# Patient Record
Sex: FEMALE | Race: WHITE | NOT HISPANIC OR LATINO | Employment: UNEMPLOYED | URBAN - METROPOLITAN AREA
[De-identification: names, ages, dates, MRNs, and addresses within clinical notes are randomized per-mention and may not be internally consistent; named-entity substitution may affect disease eponyms.]

---

## 2018-03-11 ENCOUNTER — NURSE TRIAGE (OUTPATIENT)
Dept: ADMINISTRATIVE | Facility: CLINIC | Age: 63
End: 2018-03-11

## 2018-03-11 PROBLEM — G44.209 TENSION TYPE HEADACHE: Status: ACTIVE | Noted: 2018-03-11

## 2018-03-11 NOTE — TELEPHONE ENCOUNTER
Reason for Disposition   Difficult to awaken or acting confused  (e.g., disoriented, slurred speech)    Protocols used: ST HEADACHE-A-AH    EC states that pt c/o HA for the last few days.  Today pt is confused, described as having a hard time completing a sentence or focusing. Last BP was 160/90.  EC advised per protocol and verbalizes understanding.

## 2018-03-15 PROBLEM — I63.412 STROKE DUE TO EMBOLISM OF LEFT MIDDLE CEREBRAL ARTERY: Status: ACTIVE | Noted: 2018-03-15

## 2018-03-15 PROBLEM — I63.9 STROKE: Status: ACTIVE | Noted: 2018-03-15

## 2018-08-02 PROBLEM — R73.01 IFG (IMPAIRED FASTING GLUCOSE): Status: ACTIVE | Noted: 2018-08-02

## 2019-07-22 ENCOUNTER — PATIENT OUTREACH (OUTPATIENT)
Dept: ADMINISTRATIVE | Facility: HOSPITAL | Age: 64
End: 2019-07-22

## 2019-08-11 ENCOUNTER — LAB VISIT (OUTPATIENT)
Dept: LAB | Facility: HOSPITAL | Age: 64
End: 2019-08-11
Attending: NURSE PRACTITIONER

## 2019-08-11 DIAGNOSIS — Z12.11 COLON CANCER SCREENING: ICD-10-CM

## 2019-08-11 PROCEDURE — 82274 ASSAY TEST FOR BLOOD FECAL: CPT

## 2019-08-16 LAB — HEMOCCULT STL QL IA: POSITIVE

## 2020-01-27 ENCOUNTER — PATIENT OUTREACH (OUTPATIENT)
Dept: ADMINISTRATIVE | Facility: HOSPITAL | Age: 65
End: 2020-01-27

## 2021-04-28 ENCOUNTER — PATIENT OUTREACH (OUTPATIENT)
Dept: ADMINISTRATIVE | Facility: HOSPITAL | Age: 66
End: 2021-04-28

## 2021-04-28 DIAGNOSIS — Z12.31 ENCOUNTER FOR SCREENING MAMMOGRAM FOR MALIGNANT NEOPLASM OF BREAST: Primary | ICD-10-CM

## 2021-06-16 ENCOUNTER — OFFICE VISIT (OUTPATIENT)
Dept: FAMILY MEDICINE | Facility: CLINIC | Age: 66
End: 2021-06-16
Payer: MEDICARE

## 2021-06-16 VITALS
WEIGHT: 214.75 LBS | HEART RATE: 63 BPM | DIASTOLIC BLOOD PRESSURE: 65 MMHG | HEIGHT: 66 IN | SYSTOLIC BLOOD PRESSURE: 132 MMHG | BODY MASS INDEX: 34.51 KG/M2 | TEMPERATURE: 98 F

## 2021-06-16 DIAGNOSIS — R73.01 IFG (IMPAIRED FASTING GLUCOSE): ICD-10-CM

## 2021-06-16 DIAGNOSIS — I10 ESSENTIAL HYPERTENSION: ICD-10-CM

## 2021-06-16 DIAGNOSIS — Z76.0 MEDICATION REFILL: ICD-10-CM

## 2021-06-16 DIAGNOSIS — E78.5 HYPERLIPIDEMIA, UNSPECIFIED HYPERLIPIDEMIA TYPE: ICD-10-CM

## 2021-06-16 DIAGNOSIS — Z00.00 ANNUAL PHYSICAL EXAM: Primary | ICD-10-CM

## 2021-06-16 DIAGNOSIS — I63.412 STROKE DUE TO EMBOLISM OF LEFT MIDDLE CEREBRAL ARTERY: ICD-10-CM

## 2021-06-16 PROCEDURE — 3008F PR BODY MASS INDEX (BMI) DOCUMENTED: ICD-10-PCS | Mod: S$GLB,,, | Performed by: NURSE PRACTITIONER

## 2021-06-16 PROCEDURE — 3288F FALL RISK ASSESSMENT DOCD: CPT | Mod: S$GLB,,, | Performed by: NURSE PRACTITIONER

## 2021-06-16 PROCEDURE — 1101F PR PT FALLS ASSESS DOC 0-1 FALLS W/OUT INJ PAST YR: ICD-10-PCS | Mod: S$GLB,,, | Performed by: NURSE PRACTITIONER

## 2021-06-16 PROCEDURE — 99204 OFFICE O/P NEW MOD 45 MIN: CPT | Mod: S$GLB,,, | Performed by: NURSE PRACTITIONER

## 2021-06-16 PROCEDURE — 99999 PR PBB SHADOW E&M-EST. PATIENT-LVL IV: CPT | Mod: PBBFAC,,, | Performed by: NURSE PRACTITIONER

## 2021-06-16 PROCEDURE — 1126F AMNT PAIN NOTED NONE PRSNT: CPT | Mod: S$GLB,,, | Performed by: NURSE PRACTITIONER

## 2021-06-16 PROCEDURE — 1101F PT FALLS ASSESS-DOCD LE1/YR: CPT | Mod: S$GLB,,, | Performed by: NURSE PRACTITIONER

## 2021-06-16 PROCEDURE — 3288F PR FALLS RISK ASSESSMENT DOCUMENTED: ICD-10-PCS | Mod: S$GLB,,, | Performed by: NURSE PRACTITIONER

## 2021-06-16 PROCEDURE — 3008F BODY MASS INDEX DOCD: CPT | Mod: S$GLB,,, | Performed by: NURSE PRACTITIONER

## 2021-06-16 PROCEDURE — 99204 PR OFFICE/OUTPT VISIT, NEW, LEVL IV, 45-59 MIN: ICD-10-PCS | Mod: S$GLB,,, | Performed by: NURSE PRACTITIONER

## 2021-06-16 PROCEDURE — 1126F PR PAIN SEVERITY QUANTIFIED, NO PAIN PRESENT: ICD-10-PCS | Mod: S$GLB,,, | Performed by: NURSE PRACTITIONER

## 2021-06-16 PROCEDURE — 99999 PR PBB SHADOW E&M-EST. PATIENT-LVL IV: ICD-10-PCS | Mod: PBBFAC,,, | Performed by: NURSE PRACTITIONER

## 2021-06-16 RX ORDER — ATORVASTATIN CALCIUM 80 MG/1
80 TABLET, FILM COATED ORAL DAILY
Qty: 90 TABLET | Refills: 1 | Status: SHIPPED | OUTPATIENT
Start: 2021-06-16 | End: 2022-02-14 | Stop reason: SDUPTHER

## 2021-06-16 RX ORDER — CARVEDILOL 6.25 MG/1
6.25 TABLET ORAL 2 TIMES DAILY WITH MEALS
Qty: 180 TABLET | Refills: 1 | Status: SHIPPED | OUTPATIENT
Start: 2021-06-16 | End: 2022-02-14 | Stop reason: SDUPTHER

## 2021-06-16 RX ORDER — CLOPIDOGREL BISULFATE 75 MG/1
75 TABLET ORAL DAILY
Qty: 90 TABLET | Refills: 1 | Status: SHIPPED | OUTPATIENT
Start: 2021-06-16 | End: 2022-02-14 | Stop reason: SDUPTHER

## 2021-06-16 RX ORDER — AMLODIPINE BESYLATE 10 MG/1
10 TABLET ORAL DAILY
Qty: 90 TABLET | Refills: 1 | Status: SHIPPED | OUTPATIENT
Start: 2021-06-16 | End: 2022-02-14 | Stop reason: SDUPTHER

## 2021-08-06 ENCOUNTER — LAB VISIT (OUTPATIENT)
Dept: LAB | Facility: HOSPITAL | Age: 66
End: 2021-08-06
Attending: NURSE PRACTITIONER
Payer: MEDICARE

## 2021-08-06 DIAGNOSIS — I10 ESSENTIAL HYPERTENSION: ICD-10-CM

## 2021-08-06 LAB — TSH SERPL DL<=0.005 MIU/L-ACNC: 2.19 UIU/ML (ref 0.4–4)

## 2021-08-06 PROCEDURE — 84443 ASSAY THYROID STIM HORMONE: CPT | Performed by: NURSE PRACTITIONER

## 2021-08-06 PROCEDURE — 36415 COLL VENOUS BLD VENIPUNCTURE: CPT | Mod: PO | Performed by: NURSE PRACTITIONER

## 2021-08-16 ENCOUNTER — OFFICE VISIT (OUTPATIENT)
Dept: FAMILY MEDICINE | Facility: CLINIC | Age: 66
End: 2021-08-16
Payer: MEDICARE

## 2021-08-16 VITALS
BODY MASS INDEX: 33.75 KG/M2 | HEART RATE: 60 BPM | TEMPERATURE: 98 F | WEIGHT: 210 LBS | HEIGHT: 66 IN | DIASTOLIC BLOOD PRESSURE: 68 MMHG | SYSTOLIC BLOOD PRESSURE: 131 MMHG

## 2021-08-16 DIAGNOSIS — E87.6 HYPOKALEMIA: ICD-10-CM

## 2021-08-16 DIAGNOSIS — Z86.73 HISTORY OF CARDIOEMBOLIC CEREBROVASCULAR ACCIDENT (CVA): ICD-10-CM

## 2021-08-16 DIAGNOSIS — E78.2 MIXED HYPERLIPIDEMIA: Primary | ICD-10-CM

## 2021-08-16 DIAGNOSIS — Z12.31 ENCOUNTER FOR SCREENING MAMMOGRAM FOR BREAST CANCER: ICD-10-CM

## 2021-08-16 DIAGNOSIS — R42 VERTIGO: ICD-10-CM

## 2021-08-16 DIAGNOSIS — I10 ESSENTIAL HYPERTENSION: ICD-10-CM

## 2021-08-16 PROCEDURE — 3288F PR FALLS RISK ASSESSMENT DOCUMENTED: ICD-10-PCS | Mod: S$GLB,,, | Performed by: INTERNAL MEDICINE

## 2021-08-16 PROCEDURE — 1100F PTFALLS ASSESS-DOCD GE2>/YR: CPT | Mod: S$GLB,,, | Performed by: INTERNAL MEDICINE

## 2021-08-16 PROCEDURE — 1126F PR PAIN SEVERITY QUANTIFIED, NO PAIN PRESENT: ICD-10-PCS | Mod: S$GLB,,, | Performed by: INTERNAL MEDICINE

## 2021-08-16 PROCEDURE — 1100F PR PT FALLS ASSESS DOC 2+ FALLS/FALL W/INJURY/YR: ICD-10-PCS | Mod: S$GLB,,, | Performed by: INTERNAL MEDICINE

## 2021-08-16 PROCEDURE — 3075F SYST BP GE 130 - 139MM HG: CPT | Mod: S$GLB,,, | Performed by: INTERNAL MEDICINE

## 2021-08-16 PROCEDURE — 1126F AMNT PAIN NOTED NONE PRSNT: CPT | Mod: S$GLB,,, | Performed by: INTERNAL MEDICINE

## 2021-08-16 PROCEDURE — 3078F PR MOST RECENT DIASTOLIC BLOOD PRESSURE < 80 MM HG: ICD-10-PCS | Mod: S$GLB,,, | Performed by: INTERNAL MEDICINE

## 2021-08-16 PROCEDURE — 3078F DIAST BP <80 MM HG: CPT | Mod: S$GLB,,, | Performed by: INTERNAL MEDICINE

## 2021-08-16 PROCEDURE — 3044F HG A1C LEVEL LT 7.0%: CPT | Mod: S$GLB,,, | Performed by: INTERNAL MEDICINE

## 2021-08-16 PROCEDURE — 99214 OFFICE O/P EST MOD 30 MIN: CPT | Mod: S$GLB,,, | Performed by: INTERNAL MEDICINE

## 2021-08-16 PROCEDURE — 1159F MED LIST DOCD IN RCRD: CPT | Mod: S$GLB,,, | Performed by: INTERNAL MEDICINE

## 2021-08-16 PROCEDURE — 3288F FALL RISK ASSESSMENT DOCD: CPT | Mod: S$GLB,,, | Performed by: INTERNAL MEDICINE

## 2021-08-16 PROCEDURE — 3075F PR MOST RECENT SYSTOLIC BLOOD PRESS GE 130-139MM HG: ICD-10-PCS | Mod: S$GLB,,, | Performed by: INTERNAL MEDICINE

## 2021-08-16 PROCEDURE — 3008F PR BODY MASS INDEX (BMI) DOCUMENTED: ICD-10-PCS | Mod: S$GLB,,, | Performed by: INTERNAL MEDICINE

## 2021-08-16 PROCEDURE — 3008F BODY MASS INDEX DOCD: CPT | Mod: S$GLB,,, | Performed by: INTERNAL MEDICINE

## 2021-08-16 PROCEDURE — 99214 PR OFFICE/OUTPT VISIT, EST, LEVL IV, 30-39 MIN: ICD-10-PCS | Mod: S$GLB,,, | Performed by: INTERNAL MEDICINE

## 2021-08-16 PROCEDURE — 3044F PR MOST RECENT HEMOGLOBIN A1C LEVEL <7.0%: ICD-10-PCS | Mod: S$GLB,,, | Performed by: INTERNAL MEDICINE

## 2021-08-16 PROCEDURE — 99999 PR PBB SHADOW E&M-EST. PATIENT-LVL IV: ICD-10-PCS | Mod: PBBFAC,,, | Performed by: INTERNAL MEDICINE

## 2021-08-16 PROCEDURE — 1159F PR MEDICATION LIST DOCUMENTED IN MEDICAL RECORD: ICD-10-PCS | Mod: S$GLB,,, | Performed by: INTERNAL MEDICINE

## 2021-08-16 PROCEDURE — 99999 PR PBB SHADOW E&M-EST. PATIENT-LVL IV: CPT | Mod: PBBFAC,,, | Performed by: INTERNAL MEDICINE

## 2021-08-16 RX ORDER — METHYLPREDNISOLONE 4 MG/1
TABLET ORAL
Qty: 21 TABLET | Refills: 0 | Status: SHIPPED | OUTPATIENT
Start: 2021-08-16 | End: 2022-05-12

## 2021-08-16 RX ORDER — FLUTICASONE PROPIONATE 50 MCG
SPRAY, SUSPENSION (ML) NASAL
Qty: 16 G | Refills: 12 | Status: SHIPPED | OUTPATIENT
Start: 2021-08-16 | End: 2022-02-14 | Stop reason: SDUPTHER

## 2022-02-14 DIAGNOSIS — E87.6 HYPOKALEMIA: ICD-10-CM

## 2022-02-14 DIAGNOSIS — E78.2 MIXED HYPERLIPIDEMIA: Primary | ICD-10-CM

## 2022-02-14 DIAGNOSIS — I10 ESSENTIAL HYPERTENSION: ICD-10-CM

## 2022-02-14 DIAGNOSIS — R42 VERTIGO: ICD-10-CM

## 2022-02-14 RX ORDER — CARVEDILOL 6.25 MG/1
6.25 TABLET ORAL 2 TIMES DAILY WITH MEALS
Qty: 180 TABLET | Refills: 0 | Status: SHIPPED | OUTPATIENT
Start: 2022-02-14 | End: 2022-05-12 | Stop reason: SDUPTHER

## 2022-02-14 RX ORDER — CLOPIDOGREL BISULFATE 75 MG/1
75 TABLET ORAL DAILY
Qty: 90 TABLET | Refills: 0 | Status: SHIPPED | OUTPATIENT
Start: 2022-02-14 | End: 2022-05-12 | Stop reason: SDUPTHER

## 2022-02-14 RX ORDER — AMLODIPINE BESYLATE 10 MG/1
10 TABLET ORAL DAILY
Qty: 90 TABLET | Refills: 0 | Status: SHIPPED | OUTPATIENT
Start: 2022-02-14 | End: 2022-05-12 | Stop reason: SDUPTHER

## 2022-02-14 RX ORDER — HYDROCHLOROTHIAZIDE 25 MG/1
25 TABLET ORAL DAILY
Qty: 90 TABLET | Refills: 0 | Status: SHIPPED | OUTPATIENT
Start: 2022-02-14 | End: 2022-05-03 | Stop reason: SDUPTHER

## 2022-02-14 RX ORDER — POTASSIUM CHLORIDE 20 MEQ/1
20 TABLET, EXTENDED RELEASE ORAL DAILY
Qty: 90 TABLET | Refills: 0 | Status: SHIPPED | OUTPATIENT
Start: 2022-02-14 | End: 2022-05-12 | Stop reason: SDUPTHER

## 2022-02-14 RX ORDER — ATORVASTATIN CALCIUM 80 MG/1
80 TABLET, FILM COATED ORAL DAILY
Qty: 90 TABLET | Refills: 0 | Status: SHIPPED | OUTPATIENT
Start: 2022-02-14 | End: 2022-05-12 | Stop reason: SDUPTHER

## 2022-02-14 RX ORDER — FLUTICASONE PROPIONATE 50 MCG
SPRAY, SUSPENSION (ML) NASAL
Qty: 16 G | Refills: 0 | Status: SHIPPED | OUTPATIENT
Start: 2022-02-14 | End: 2022-05-12 | Stop reason: SDUPTHER

## 2022-02-14 NOTE — TELEPHONE ENCOUNTER
----- Message from Lauren Issa sent at 2/14/2022  8:46 AM CST -----  HAD TO CANCEL HER APPT WITH [facility].  She will be out of her meds can you please refill all of her meds.  She will be out.  [facility] [place].  Please advise

## 2022-02-14 NOTE — TELEPHONE ENCOUNTER
No new care gaps identified.  Powered by [employer]. Reference number: [id number].   2/14/2022 10:52:25 AM CST

## 2022-02-15 NOTE — TELEPHONE ENCOUNTER
----- Message from Tammi Dotson sent at 2/15/2022 10:35 AM CST -----  Contact: self/[phone]  Pt called in regards to checking the status of her paper work so she can stop her Rx so she can get her teeth abstraction. Pt would like a call back.      Please advise

## 2022-02-15 NOTE — TELEPHONE ENCOUNTER
The patient requested medicine refills and I did refill it once.  Message the patient to notify of any health maintenance care gaps that need to be arranged.   Health Maintenance Due   Topic Date Due    COVID-19 Vaccine (1) Never done    Shingles Vaccine (1 of 2) Never done    Pneumococcal Vaccines (Age 65+) (1 of 1 - PPSV23) Never done    Mammogram  02/10/2021    Influenza Vaccine (1) Never done     BP Readings from Last 3 Encounters:   08/16/21 131/68   06/16/21 132/65   02/10/21 132/60     [unfilled]

## 2022-02-15 NOTE — TELEPHONE ENCOUNTER
Pt dropped off paper from [facility] needing clearance to have a tooth extraction. Pt is currently on a  blood thinner, so paper needs to be signed by physician. pls advise. Thanks.

## 2022-02-15 NOTE — TELEPHONE ENCOUNTER
She is apparently on aspirin and Plavix from remote stroke from 2018. She can hold aspirin starting 7 days prior to procedure and hold Plavix starting 5 days prior to procedure.  She can resume after hemostasis obtained after procedure.

## 2022-02-16 DIAGNOSIS — I10 ESSENTIAL HYPERTENSION: ICD-10-CM

## 2022-05-03 DIAGNOSIS — I10 ESSENTIAL HYPERTENSION: ICD-10-CM

## 2022-05-03 RX ORDER — HYDROCHLOROTHIAZIDE 25 MG/1
25 TABLET ORAL DAILY
Qty: 90 TABLET | Refills: 0 | Status: SHIPPED | OUTPATIENT
Start: 2022-05-03 | End: 2022-05-09 | Stop reason: SDUPTHER

## 2022-05-03 NOTE — TELEPHONE ENCOUNTER
Care Due:                  Date            Visit Type   Department     Provider  --------------------------------------------------------------------------------                                EP -                              PRIMARY      [facility]  Last Visit: 08-      CARE ([facility])   DHEERAJ Rosario                               -                              PRIMARY      [facility]  Next Visit: 05-      CARE ([facility])   MEDICINE       Ximena Rosario                                                            Last  Test          Frequency    Reason                     Performed    Due Date  --------------------------------------------------------------------------------    CBC.........  12 months..  clopidogreL..............  02-   02-    CMP.........  12 months..  atorvastatin,              02-   02-                             hydroCHLOROthiazide,                             potassium................    Lipid Panel.  12 months..  atorvastatin.............  02-   02-    Health [facility] Embedded Care Gaps. Reference number: [id number]. 5/03/2022   12:29:38 PM CDT

## 2022-05-03 NOTE — TELEPHONE ENCOUNTER
----- Message from Tayler James sent at 5/3/2022 11:12 AM CDT -----  Type:  RX Refill Request    Who Called: pt  Refill or New Rx:refill  RX Name and Strength:hydrochlorothiazide   How is the patient currently taking it? (ex. 1XDay):1XDay  Is this a 30 day or 90 day RX:90  Preferred Pharmacy with phone number:.  [facility] [address] [place], [place] - [address] [facility]  [address] [address]  Phone: [phone] Fax: [phone]  Local or Mail Order:local  Ordering Provider:Dr Diop   Would the patient rather a call back or a response via [facility]? call  Best Call Back Number:[phone]   Additional Information: States she is going to run out of the medication, before her appt on next Thursday. She would like a call or text when its called in.        Thank you

## 2022-05-09 DIAGNOSIS — I10 ESSENTIAL HYPERTENSION: ICD-10-CM

## 2022-05-09 RX ORDER — HYDROCHLOROTHIAZIDE 25 MG/1
25 TABLET ORAL DAILY
Qty: 90 TABLET | Refills: 0 | Status: SHIPPED | OUTPATIENT
Start: 2022-05-09 | End: 2022-05-12 | Stop reason: SDUPTHER

## 2022-05-09 NOTE — TELEPHONE ENCOUNTER
No new care gaps identified.  [facility] Embedded Care Gaps. Reference number: [id number]. 5/09/2022   2:33:00 PM CDT

## 2022-05-12 ENCOUNTER — OFFICE VISIT (OUTPATIENT)
Dept: FAMILY MEDICINE | Facility: CLINIC | Age: 67
End: 2022-05-12
Payer: MEDICARE

## 2022-05-12 ENCOUNTER — HOSPITAL ENCOUNTER (OUTPATIENT)
Dept: RADIOLOGY | Facility: HOSPITAL | Age: 67
Discharge: HOME | End: 2022-05-12
Attending: INTERNAL MEDICINE
Payer: MEDICARE

## 2022-05-12 ENCOUNTER — TELEPHONE (OUTPATIENT)
Dept: FAMILY MEDICINE | Facility: CLINIC | Age: 67
End: 2022-05-12
Payer: MEDICARE

## 2022-05-12 VITALS
TEMPERATURE: 98 F | WEIGHT: 189 LBS | HEIGHT: 66 IN | SYSTOLIC BLOOD PRESSURE: 139 MMHG | BODY MASS INDEX: 30.37 KG/M2 | HEART RATE: 71 BPM | DIASTOLIC BLOOD PRESSURE: 66 MMHG

## 2022-05-12 DIAGNOSIS — R63.4 WEIGHT LOSS: ICD-10-CM

## 2022-05-12 DIAGNOSIS — R91.8 MULTIPLE PULMONARY NODULES: Primary | ICD-10-CM

## 2022-05-12 DIAGNOSIS — E87.6 HYPOKALEMIA: ICD-10-CM

## 2022-05-12 DIAGNOSIS — E78.2 MIXED HYPERLIPIDEMIA: ICD-10-CM

## 2022-05-12 DIAGNOSIS — Z86.73 HISTORY OF CARDIOEMBOLIC CEREBROVASCULAR ACCIDENT (CVA): ICD-10-CM

## 2022-05-12 DIAGNOSIS — I10 ESSENTIAL HYPERTENSION: Primary | ICD-10-CM

## 2022-05-12 DIAGNOSIS — R73.9 HYPERGLYCEMIA: ICD-10-CM

## 2022-05-12 DIAGNOSIS — Z79.899 ENCOUNTER FOR LONG-TERM (CURRENT) USE OF HIGH-RISK MEDICATION: ICD-10-CM

## 2022-05-12 DIAGNOSIS — R06.2 WHEEZING: ICD-10-CM

## 2022-05-12 DIAGNOSIS — R42 VERTIGO: ICD-10-CM

## 2022-05-12 DIAGNOSIS — Z13.6 ENCOUNTER FOR LIPID SCREENING FOR CARDIOVASCULAR DISEASE: ICD-10-CM

## 2022-05-12 DIAGNOSIS — Z13.220 ENCOUNTER FOR LIPID SCREENING FOR CARDIOVASCULAR DISEASE: ICD-10-CM

## 2022-05-12 DIAGNOSIS — J30.9 ALLERGIC RHINITIS, UNSPECIFIED SEASONALITY, UNSPECIFIED TRIGGER: ICD-10-CM

## 2022-05-12 PROCEDURE — 71046 X-RAY EXAM CHEST 2 VIEWS: CPT | Mod: TC,PO

## 2022-05-12 PROCEDURE — 99999 PR PBB SHADOW E&M-EST. PATIENT-LVL IV: CPT | Mod: PBBFAC,,, | Performed by: INTERNAL MEDICINE

## 2022-05-12 PROCEDURE — 3044F HG A1C LEVEL LT 7.0%: CPT | Mod: CPTII,S$GLB,, | Performed by: INTERNAL MEDICINE

## 2022-05-12 PROCEDURE — 3078F PR MOST RECENT DIASTOLIC BLOOD PRESSURE < 80 MM HG: ICD-10-PCS | Mod: CPTII,S$GLB,, | Performed by: INTERNAL MEDICINE

## 2022-05-12 PROCEDURE — 3044F PR MOST RECENT HEMOGLOBIN A1C LEVEL <7.0%: ICD-10-PCS | Mod: CPTII,S$GLB,, | Performed by: INTERNAL MEDICINE

## 2022-05-12 PROCEDURE — 3078F DIAST BP <80 MM HG: CPT | Mod: CPTII,S$GLB,, | Performed by: INTERNAL MEDICINE

## 2022-05-12 PROCEDURE — 1126F AMNT PAIN NOTED NONE PRSNT: CPT | Mod: CPTII,S$GLB,, | Performed by: INTERNAL MEDICINE

## 2022-05-12 PROCEDURE — 1101F PT FALLS ASSESS-DOCD LE1/YR: CPT | Mod: CPTII,S$GLB,, | Performed by: INTERNAL MEDICINE

## 2022-05-12 PROCEDURE — 3288F PR FALLS RISK ASSESSMENT DOCUMENTED: ICD-10-PCS | Mod: CPTII,S$GLB,, | Performed by: INTERNAL MEDICINE

## 2022-05-12 PROCEDURE — 3008F BODY MASS INDEX DOCD: CPT | Mod: CPTII,S$GLB,, | Performed by: INTERNAL MEDICINE

## 2022-05-12 PROCEDURE — 1126F PR PAIN SEVERITY QUANTIFIED, NO PAIN PRESENT: ICD-10-PCS | Mod: CPTII,S$GLB,, | Performed by: INTERNAL MEDICINE

## 2022-05-12 PROCEDURE — 71046 XR CHEST PA AND LATERAL: ICD-10-PCS | Mod: 26,,, | Performed by: RADIOLOGY

## 2022-05-12 PROCEDURE — 3075F PR MOST RECENT SYSTOLIC BLOOD PRESS GE 130-139MM HG: ICD-10-PCS | Mod: CPTII,S$GLB,, | Performed by: INTERNAL MEDICINE

## 2022-05-12 PROCEDURE — 99214 PR OFFICE/OUTPT VISIT, EST, LEVL IV, 30-39 MIN: ICD-10-PCS | Mod: S$GLB,,, | Performed by: INTERNAL MEDICINE

## 2022-05-12 PROCEDURE — 99999 PR PBB SHADOW E&M-EST. PATIENT-LVL IV: ICD-10-PCS | Mod: PBBFAC,,, | Performed by: INTERNAL MEDICINE

## 2022-05-12 PROCEDURE — 3075F SYST BP GE 130 - 139MM HG: CPT | Mod: CPTII,S$GLB,, | Performed by: INTERNAL MEDICINE

## 2022-05-12 PROCEDURE — 1101F PR PT FALLS ASSESS DOC 0-1 FALLS W/OUT INJ PAST YR: ICD-10-PCS | Mod: CPTII,S$GLB,, | Performed by: INTERNAL MEDICINE

## 2022-05-12 PROCEDURE — 3288F FALL RISK ASSESSMENT DOCD: CPT | Mod: CPTII,S$GLB,, | Performed by: INTERNAL MEDICINE

## 2022-05-12 PROCEDURE — 71046 X-RAY EXAM CHEST 2 VIEWS: CPT | Mod: 26,,, | Performed by: RADIOLOGY

## 2022-05-12 PROCEDURE — 3008F PR BODY MASS INDEX (BMI) DOCUMENTED: ICD-10-PCS | Mod: CPTII,S$GLB,, | Performed by: INTERNAL MEDICINE

## 2022-05-12 PROCEDURE — 99214 OFFICE O/P EST MOD 30 MIN: CPT | Mod: S$GLB,,, | Performed by: INTERNAL MEDICINE

## 2022-05-12 RX ORDER — POTASSIUM CHLORIDE 20 MEQ/1
20 TABLET, EXTENDED RELEASE ORAL DAILY
Qty: 90 TABLET | Refills: 1 | Status: ON HOLD | OUTPATIENT
Start: 2022-05-12 | End: 2022-09-06 | Stop reason: SDUPTHER

## 2022-05-12 RX ORDER — CLOPIDOGREL BISULFATE 75 MG/1
75 TABLET ORAL DAILY
Qty: 90 TABLET | Refills: 1 | Status: SHIPPED | OUTPATIENT
Start: 2022-05-12 | End: 2022-08-09

## 2022-05-12 RX ORDER — FLUTICASONE PROPIONATE 50 MCG
SPRAY, SUSPENSION (ML) NASAL
Qty: 16 G | Refills: 0 | Status: SHIPPED | OUTPATIENT
Start: 2022-05-12

## 2022-05-12 RX ORDER — AMLODIPINE BESYLATE 10 MG/1
10 TABLET ORAL DAILY
Qty: 90 TABLET | Refills: 1 | Status: ON HOLD | OUTPATIENT
Start: 2022-05-12 | End: 2022-09-06 | Stop reason: SDUPTHER

## 2022-05-12 RX ORDER — CARVEDILOL 6.25 MG/1
6.25 TABLET ORAL 2 TIMES DAILY WITH MEALS
Qty: 180 TABLET | Refills: 1 | Status: SHIPPED | OUTPATIENT
Start: 2022-05-12 | End: 2022-07-11

## 2022-05-12 RX ORDER — ATORVASTATIN CALCIUM 80 MG/1
80 TABLET, FILM COATED ORAL DAILY
Qty: 90 TABLET | Refills: 1 | Status: ON HOLD | OUTPATIENT
Start: 2022-05-12 | End: 2022-09-06 | Stop reason: SDUPTHER

## 2022-05-12 RX ORDER — CETIRIZINE HYDROCHLORIDE 10 MG/1
10 TABLET ORAL DAILY
Qty: 30 TABLET | Refills: 11 | Status: ON HOLD | OUTPATIENT
Start: 2022-05-12 | End: 2022-09-06 | Stop reason: SDUPTHER

## 2022-05-12 RX ORDER — HYDROCHLOROTHIAZIDE 25 MG/1
25 TABLET ORAL DAILY
Qty: 90 TABLET | Refills: 1 | Status: SHIPPED | OUTPATIENT
Start: 2022-05-12 | End: 2022-09-01 | Stop reason: CLARIF

## 2022-05-12 NOTE — TELEPHONE ENCOUNTER
Concerning findings on chest xray. Patient is aware of chest xray results.    Needs follow up with CT    I have signed for the following orders AND/OR meds.  Please call the patient and ask the patient to schedule the testing AND/OR inform about any medications that were sent. Medications have been sent to pharmacy listed below      Orders Placed This Encounter   Procedures    CT Chest Abdomen Pelvis W W/O Contrast (XPD)     Standing Status:   Future     Standing Expiration Date:   5/12/2023     Order Specific Question:   Is the patient allergic to iodine or contrast?     Answer:   No     Order Specific Question:   Is the patient on ANY Metformin drug such as Glucophage/Glucovance?           Should be off drug 48 hours after contrast. Check renal function before restart.     Answer:   No     Order Specific Question:   History of Kidney Disease - including: decreased kidney function, dialysis, kidney transplay, single kidney, kidney cancer, kidney surgery?     Answer:   None     Order Specific Question:   Diabetes?     Answer:   No     Order Specific Question:   May the Radiologist modify the order per protocol to meet the clinical needs of the patient?     Answer:   Yes     Order Specific Question:   Oral/Rectal Contrast instructions:     Answer:   NO Oral Contrast     Order Specific Question:   Special CT ABD Protocol Request?     Answer:   Routine    CT Chest Abdomen Pelvis W W/O Contrast (XPD)     Standing Status:   Future     Standing Expiration Date:   5/12/2023     Order Specific Question:   Is the patient allergic to iodine or contrast?     Answer:   No     Order Specific Question:   Is the patient on ANY Metformin drug such as Glucophage/Glucovance?           Should be off drug 48 hours after contrast. Check renal function before restart.     Answer:   No     Order Specific Question:   History of Kidney Disease - including: decreased kidney function, dialysis, kidney transplay, single kidney, kidney  cancer, kidney surgery?     Answer:   None     Order Specific Question:   Diabetes?     Answer:   No     Order Specific Question:   May the Radiologist modify the order per protocol to meet the clinical needs of the patient?     Answer:   Yes     Order Specific Question:   Oral/Rectal Contrast instructions:     Answer:   NO Oral Contrast     Order Specific Question:   Special CT ABD Protocol Request?     Answer:   Routine              [id number]  [place] [address] [facility] [address]  1967 [address] [place] [address]  Phone: [phone] Fax: [phone]

## 2022-05-12 NOTE — PROGRESS NOTES
Assessment/Plan:    Problem List Items Addressed This Visit        Neuro    History of cardioembolic cerebrovascular accident (CVA)    Overview     -hx of CVA in 2018  -embolism of L MCA  -has been on both ASA/plavix since d/c  -denies residual effects from stroke           Relevant Medications    clopidogreL (PLAVIX) 75 mg tablet       Cardiac/Vascular    Essential hypertension - Primary    Overview     -at goal today  -currently on amlodipine 10 mg QD, coreg 6.25 mg BID, HCTZ 25 mg QD  -continue lifestyle modification with low sodium diet and exercise   -discussed hypertension disease course and importance of treating high blood pressure  -patient understood and advised of risk of untreated blood pressure.  ER precautions were given   for symptoms of hypertensive urgency and emergency.           Relevant Medications    amLODIPine (NORVASC) 10 MG tablet    carvediloL (COREG) 6.25 MG tablet    hydroCHLOROthiazide (HYDRODIURIL) 25 MG tablet    Other Relevant Orders    Comprehensive Metabolic Panel    TSH (Completed)    HLD (hyperlipidemia)    Overview     -chronic condition. Currently stable.    -reports compliance with hyperlipidemia treatment as prescribed  -denies any known adverse effects of medications  -recent labs listed below; due for updated labs  Lab Results   Component Value Date    CHOL 146 02/10/2021     Lab Results   Component Value Date    HDL 39 (L) 02/10/2021     Lab Results   Component Value Date    LDLCALC 86.4 02/10/2021     Lab Results   Component Value Date    TRIG 103 02/10/2021     Lab Results   Component Value Date    ALT 14 02/10/2021    AST 17 02/10/2021    ALKPHOS 104 02/10/2021    BILITOT 0.5 02/10/2021              Relevant Medications    atorvastatin (LIPITOR) 80 MG tablet      Other Visit Diagnoses     Encounter for lipid screening for cardiovascular disease        Encounter for long-term (current) use of high-risk medication        Relevant Orders    Comprehensive Metabolic Panel     CBC Without Differential    Lipid Panel    TSH (Completed)    Hemoglobin A1C    Hyperglycemia        Relevant Orders    Hemoglobin A1C    Weight loss      -30 lb unintentional weight loss over the past year  -age-appropriate cancer screening up to date  -checking TSH  -checking CXR given hx of cough    Relevant Orders    TSH (Completed)    X-Ray Chest PA And Lateral (Completed)    Wheezing      -recent symptoms of wheezing, cough and weight loss  -plan to follow up with CXR    Relevant Orders    X-Ray Chest PA And Lateral (Completed)    Allergic rhinitis, unspecified seasonality, unspecified trigger        Relevant Medications    cetirizine (ZYRTEC) 10 MG tablet    fluticasone propionate (FLONASE) 50 mcg/actuation nasal spray         Hypokalemia        Relevant Medications    potassium chloride SA (K-DUR,KLOR-CON) 20 MEQ tablet          Follow up in about 6 months (around 11/12/2022), or if symptoms worsen or fail to improve, for F/U with me; labs today: cbc,cmp,tsh,lipid,a1c; Chest xray today; MMG scheduled.    Ximena Rosario MD  _____________________________________________________________________________________________________________________________________________________    CC: follow up of chronic medical conditions     HPI:    Patient is in clinic today as an established patient here for follow up of chronic medical conditions.    HTN: The patient is currently being treated for essential hypertension. This condition is chronic and stable. The patient is tolerating their medication well with good compliance.  Denies any adverse effects of medications.  Counseling was offered regarding low sodium diet.  The patient has a reduced salt intake. Routine exercise recommended. The patient denies headache, vision changes, chest pain, palpitations, shortness of breath, or lower extremity edema.    Weight loss: patient reports weight loss recently. Going back on chart, appears to have lost about 30 lbs. Patient  reports this is unintentional. Denies dieting or exercise. Her only recent symptom is a daily dry cough. Denies SOB but has noticed some wheezing at time. She is a non-smoker. Denies fever, lymphadenopathy or fatigue.    No other new complaints today.  Remaining chronic conditions have been reviewed and remain stable. Further detail as stated above.       Past Medical History:  Past Medical History:   Diagnosis Date    Hyperlipidemia     Hypertension     Stroke      Past Surgical History:   Procedure Laterality Date    TONSILLECTOMY       Review of patient's allergies indicates:  No Known Allergies  Social History     Tobacco Use    Smoking status: Never Smoker    Smokeless tobacco: Never Used   Substance Use Topics    Alcohol use: No     Alcohol/week: 0.0 standard drinks    Drug use: No     Family History   Problem Relation Age of Onset    Stroke Mother     Stroke Father     Stroke Sister      Current Outpatient Medications on File Prior to Visit   Medication Sig Dispense Refill    aspirin (ECOTRIN) 81 MG EC tablet Take 1 tablet (81 mg total) by mouth once daily.       No current facility-administered medications on file prior to visit.       Review of Systems   Constitutional: Positive for unexpected weight change. Negative for chills, diaphoresis, fatigue and fever.   HENT: Negative for congestion, ear pain, postnasal drip, sinus pain and sore throat.    Eyes: Negative for pain and redness.   Respiratory: Negative for cough, chest tightness and shortness of breath.    Cardiovascular: Negative for chest pain and leg swelling.   Gastrointestinal: Negative for abdominal pain, constipation, diarrhea, nausea and vomiting.   Genitourinary: Negative for dysuria and hematuria.   Musculoskeletal: Negative for arthralgias and joint swelling.   Skin: Negative for rash.   Neurological: Negative for dizziness, syncope and headaches.       Vitals:    05/12/22 1017   BP: 139/66   Pulse: 71   Temp: 98.1 °F (36.7 °C)  "  Weight: 85.7 kg (189 lb)   Height: 5' 6" (1.676 m)       Wt Readings from Last 3 Encounters:   05/18/22 83.9 kg (185 lb)   05/16/22 85.7 kg (188 lb 15 oz)   05/12/22 85.7 kg (189 lb)       Physical Exam  Constitutional:       General: She is not in acute distress.     Appearance: Normal appearance. She is well-developed.   HENT:      Head: Normocephalic and atraumatic.   Eyes:      Conjunctiva/sclera: Conjunctivae normal.   Cardiovascular:      Rate and Rhythm: Normal rate and regular rhythm.      Pulses: Normal pulses.      Heart sounds: Normal heart sounds. No murmur heard.  Pulmonary:      Effort: Pulmonary effort is normal. No respiratory distress.      Breath sounds: Normal breath sounds.   Abdominal:      General: Bowel sounds are normal. There is no distension.      Palpations: Abdomen is soft.      Tenderness: There is no abdominal tenderness.   Musculoskeletal:         General: Normal range of motion.      Cervical back: Normal range of motion and neck supple.   Skin:     General: Skin is warm and dry.      Findings: No rash.   Neurological:      General: No focal deficit present.      Mental Status: She is alert and oriented to person, place, and time.         Health Maintenance   Topic Date Due    DEXA Scan  08/20/2022    Mammogram  05/16/2023    High Dose Statin  05/18/2023    Lipid Panel  05/12/2027    TETANUS VACCINE  03/13/2028    Hepatitis C Screening  Completed       "

## 2022-05-13 ENCOUNTER — TELEPHONE (OUTPATIENT)
Dept: FAMILY MEDICINE | Facility: CLINIC | Age: 67
End: 2022-05-13
Payer: MEDICARE

## 2022-05-13 NOTE — TELEPHONE ENCOUNTER
----- Message from Ailyn Ravi sent at 5/13/2022  8:30 AM CDT -----  Pt would like a return call regarding a canceled scan that was supposed to happen today. Please call [phone]

## 2022-05-16 ENCOUNTER — HOSPITAL ENCOUNTER (OUTPATIENT)
Dept: RADIOLOGY | Facility: HOSPITAL | Age: 67
Discharge: HOME | End: 2022-05-16
Attending: INTERNAL MEDICINE
Payer: MEDICARE

## 2022-05-16 VITALS — BODY MASS INDEX: 30.36 KG/M2 | HEIGHT: 66 IN | WEIGHT: 188.94 LBS

## 2022-05-16 DIAGNOSIS — Z12.31 ENCOUNTER FOR SCREENING MAMMOGRAM FOR BREAST CANCER: ICD-10-CM

## 2022-05-16 PROCEDURE — 77067 SCR MAMMO BI INCL CAD: CPT | Mod: 26,,, | Performed by: RADIOLOGY

## 2022-05-16 PROCEDURE — 77063 BREAST TOMOSYNTHESIS BI: CPT | Mod: 26,,, | Performed by: RADIOLOGY

## 2022-05-16 PROCEDURE — 77067 MAMMO DIGITAL SCREENING BILAT WITH TOMO: ICD-10-PCS | Mod: 26,,, | Performed by: RADIOLOGY

## 2022-05-16 PROCEDURE — 77063 BREAST TOMOSYNTHESIS BI: CPT | Mod: TC,PO

## 2022-05-16 PROCEDURE — 77063 MAMMO DIGITAL SCREENING BILAT WITH TOMO: ICD-10-PCS | Mod: 26,,, | Performed by: RADIOLOGY

## 2022-05-16 NOTE — PROGRESS NOTES
Normal mammogram, repeat in 1 year, results released through [facility]. Please verify that patient has viewed results. If not, please call patient with interpretation below:    I have reviewed the results of your mammogram and it appears that everything was read as normal.  Based on this, the radiologist has recommended that you recheck a mammogram in 1 year.     Also please see below health maintenance items that are due:    COVID-19 Vaccine(1) Never done  Shingles Vaccine(1 of 2) Never done  Pneumococcal Vaccines (Age 65+)(1 - PCV) Never done

## 2022-05-17 ENCOUNTER — HOSPITAL ENCOUNTER (OUTPATIENT)
Dept: RADIOLOGY | Facility: HOSPITAL | Age: 67
Discharge: HOME | End: 2022-05-17
Attending: INTERNAL MEDICINE
Payer: MEDICARE

## 2022-05-17 DIAGNOSIS — R91.8 MULTIPLE PULMONARY NODULES: ICD-10-CM

## 2022-05-17 LAB — GLUCOSE SERPL-MCNC: 141 MG/DL (ref 70–110)

## 2022-05-17 PROCEDURE — A9552 F18 FDG: HCPCS | Mod: PN

## 2022-05-17 PROCEDURE — 78815 PET IMAGE W/CT SKULL-THIGH: CPT | Mod: 26,PS,, | Performed by: RADIOLOGY

## 2022-05-17 PROCEDURE — 78815 NM PET CT ROUTINE: ICD-10-PCS | Mod: 26,PS,, | Performed by: RADIOLOGY

## 2022-05-17 NOTE — PROGRESS NOTES
PET Imaging Questionnaire    1. Are you a Diabetic? Recent Blood Sugar level? No    2. Are you anemic? Bone Marrow Stimulation Meds? No    3. Have you had a CT Scan, if so when & where was your last one? Yes -     4. Have you had a PET Scan, if so when & where was your last one? No    5. Chemotherapy or currently on Chemotherapy? No    6. Radiation therapy? No    Surgical History:   Past Surgical History:   Procedure Laterality Date    TONSILLECTOMY     7.      8. Have you been fasting for at least 6 hours? Yes    9. Is there any chance you may be pregnant or breastfeeding? No    Assay: 11.26 MCi@:9.53   Injection Site:lt ac     Residual: .587 mCi@: 9.55   Technologist: Nathalie Malloy Injected:10.67mCi

## 2022-05-18 ENCOUNTER — OFFICE VISIT (OUTPATIENT)
Dept: FAMILY MEDICINE | Facility: CLINIC | Age: 67
End: 2022-05-18
Payer: MEDICARE

## 2022-05-18 ENCOUNTER — TELEPHONE (OUTPATIENT)
Dept: UROLOGY | Facility: CLINIC | Age: 67
End: 2022-05-18
Payer: MEDICARE

## 2022-05-18 VITALS
HEIGHT: 66 IN | SYSTOLIC BLOOD PRESSURE: 132 MMHG | TEMPERATURE: 98 F | HEART RATE: 77 BPM | DIASTOLIC BLOOD PRESSURE: 62 MMHG | BODY MASS INDEX: 29.73 KG/M2 | WEIGHT: 185 LBS

## 2022-05-18 DIAGNOSIS — C64.2 MALIGNANT NEOPLASM OF LEFT KIDNEY: Primary | ICD-10-CM

## 2022-05-18 PROCEDURE — 99212 PR OFFICE/OUTPT VISIT, EST, LEVL II, 10-19 MIN: ICD-10-PCS | Mod: S$GLB,,, | Performed by: INTERNAL MEDICINE

## 2022-05-18 PROCEDURE — 1159F MED LIST DOCD IN RCRD: CPT | Mod: CPTII,S$GLB,, | Performed by: INTERNAL MEDICINE

## 2022-05-18 PROCEDURE — 3008F BODY MASS INDEX DOCD: CPT | Mod: CPTII,S$GLB,, | Performed by: INTERNAL MEDICINE

## 2022-05-18 PROCEDURE — 1101F PT FALLS ASSESS-DOCD LE1/YR: CPT | Mod: CPTII,S$GLB,, | Performed by: INTERNAL MEDICINE

## 2022-05-18 PROCEDURE — 3008F PR BODY MASS INDEX (BMI) DOCUMENTED: ICD-10-PCS | Mod: CPTII,S$GLB,, | Performed by: INTERNAL MEDICINE

## 2022-05-18 PROCEDURE — 3288F FALL RISK ASSESSMENT DOCD: CPT | Mod: CPTII,S$GLB,, | Performed by: INTERNAL MEDICINE

## 2022-05-18 PROCEDURE — 3075F PR MOST RECENT SYSTOLIC BLOOD PRESS GE 130-139MM HG: ICD-10-PCS | Mod: CPTII,S$GLB,, | Performed by: INTERNAL MEDICINE

## 2022-05-18 PROCEDURE — 3078F DIAST BP <80 MM HG: CPT | Mod: CPTII,S$GLB,, | Performed by: INTERNAL MEDICINE

## 2022-05-18 PROCEDURE — 1159F PR MEDICATION LIST DOCUMENTED IN MEDICAL RECORD: ICD-10-PCS | Mod: CPTII,S$GLB,, | Performed by: INTERNAL MEDICINE

## 2022-05-18 PROCEDURE — 3288F PR FALLS RISK ASSESSMENT DOCUMENTED: ICD-10-PCS | Mod: CPTII,S$GLB,, | Performed by: INTERNAL MEDICINE

## 2022-05-18 PROCEDURE — 3044F HG A1C LEVEL LT 7.0%: CPT | Mod: CPTII,S$GLB,, | Performed by: INTERNAL MEDICINE

## 2022-05-18 PROCEDURE — 3075F SYST BP GE 130 - 139MM HG: CPT | Mod: CPTII,S$GLB,, | Performed by: INTERNAL MEDICINE

## 2022-05-18 PROCEDURE — 99212 OFFICE O/P EST SF 10 MIN: CPT | Mod: S$GLB,,, | Performed by: INTERNAL MEDICINE

## 2022-05-18 PROCEDURE — 1126F AMNT PAIN NOTED NONE PRSNT: CPT | Mod: CPTII,S$GLB,, | Performed by: INTERNAL MEDICINE

## 2022-05-18 PROCEDURE — 3044F PR MOST RECENT HEMOGLOBIN A1C LEVEL <7.0%: ICD-10-PCS | Mod: CPTII,S$GLB,, | Performed by: INTERNAL MEDICINE

## 2022-05-18 PROCEDURE — 99999 PR PBB SHADOW E&M-EST. PATIENT-LVL IV: CPT | Mod: PBBFAC,,, | Performed by: INTERNAL MEDICINE

## 2022-05-18 PROCEDURE — 1101F PR PT FALLS ASSESS DOC 0-1 FALLS W/OUT INJ PAST YR: ICD-10-PCS | Mod: CPTII,S$GLB,, | Performed by: INTERNAL MEDICINE

## 2022-05-18 PROCEDURE — 99999 PR PBB SHADOW E&M-EST. PATIENT-LVL IV: ICD-10-PCS | Mod: PBBFAC,,, | Performed by: INTERNAL MEDICINE

## 2022-05-18 PROCEDURE — 3078F PR MOST RECENT DIASTOLIC BLOOD PRESSURE < 80 MM HG: ICD-10-PCS | Mod: CPTII,S$GLB,, | Performed by: INTERNAL MEDICINE

## 2022-05-18 PROCEDURE — 1126F PR PAIN SEVERITY QUANTIFIED, NO PAIN PRESENT: ICD-10-PCS | Mod: CPTII,S$GLB,, | Performed by: INTERNAL MEDICINE

## 2022-05-18 NOTE — TELEPHONE ENCOUNTER
Advised patient appt for tomorrow is with NP and she needs to see surgeon. Per Dr. Valdes, patient most likely cannot be treated surgically, may be best to see Oncologist before seeing Urologist. Explained to patient, patient expressed understanding.

## 2022-05-19 ENCOUNTER — TELEPHONE (OUTPATIENT)
Dept: HEMATOLOGY/ONCOLOGY | Facility: CLINIC | Age: 67
End: 2022-05-19
Payer: MEDICARE

## 2022-05-19 ENCOUNTER — TELEPHONE (OUTPATIENT)
Dept: FAMILY MEDICINE | Facility: CLINIC | Age: 67
End: 2022-05-19
Payer: MEDICARE

## 2022-05-19 NOTE — TELEPHONE ENCOUNTER
----- Message from Spring Love sent at 5/19/2022  9:47 AM CDT -----  Contact: Michelle/ daughter  Michelle would like a call back at [phone] or [phone], in regards to if patient can be referred to an hematologist provider closer to them. She states that Anupam Juarez is far.

## 2022-05-19 NOTE — PROGRESS NOTES
Assessment/Plan:    Problem List Items Addressed This Visit    None     Visit Diagnoses     Malignant neoplasm of left kidney    -  Primary  -concern for renal malignancy with pulmonary and skeletal mets  -referral to oncology +/- urology to discuss further work up and treatment    Relevant Orders    Ambulatory referral/consult to Hematology / Oncology    Ambulatory referral/consult to Urology          Ximena Rosario MD  _____________________________________________________________________________________________________________________________________________________    CC: imaging follow up    HPI:    Patient is in clinic today as an established patient here to discuss recent imaging results.    Patient present last week for routine annual visit. During visit, patient had reported weight loss and cough. Chest xray obtained and showed multiple pulmonary nodules concerning for metastatic disease. CT was ordered for follow up but had to be changed to PET due to contrast shortage. PET scan results showing L renal mass with suspected pulmonary mets and bony mets. These results have been reviewed with both patient and her family here with her. Her  and daughter are here with her and she has her son-in-law ( of her other daughter who lives in [place]) on the phone. We discussed need for an oncology referral to discuss treatment. Patient and her family understand that the findings are suggestive of metastatic disease. All questions addressed during clinic that I was able to answer appropriately at this time.     Past Medical History:  Past Medical History:   Diagnosis Date    Hyperlipidemia     Hypertension     Stroke      Past Surgical History:   Procedure Laterality Date    TONSILLECTOMY       Review of patient's allergies indicates:  No Known Allergies  Social History     Tobacco Use    Smoking status: Never Smoker    Smokeless tobacco: Never Used   Substance Use Topics    Alcohol use: No      Alcohol/week: 0.0 standard drinks    Drug use: No     Family History   Problem Relation Age of Onset    Stroke Mother     Stroke Father     Stroke Sister      Current Outpatient Medications on File Prior to Visit   Medication Sig Dispense Refill    amLODIPine (NORVASC) 10 MG tablet Take 1 tablet (10 mg total) by mouth once daily. 90 tablet 1    aspirin (ECOTRIN) 81 MG EC tablet Take 1 tablet (81 mg total) by mouth once daily.      atorvastatin (LIPITOR) 80 MG tablet Take 1 tablet (80 mg total) by mouth once daily. 90 tablet 1    carvediloL (COREG) 6.25 MG tablet Take 1 tablet (6.25 mg total) by mouth 2 (two) times daily with meals. 180 tablet 1    cetirizine (ZYRTEC) 10 MG tablet Take 1 tablet (10 mg total) by mouth once daily. 30 tablet 11    clopidogreL (PLAVIX) 75 mg tablet Take 1 tablet (75 mg total) by mouth once daily. 90 tablet 1    fluticasone propionate (FLONASE) 50 mcg/actuation nasal spray Use 2 sprays to each nostril daily 16 g 0    hydroCHLOROthiazide (HYDRODIURIL) 25 MG tablet Take 1 tablet (25 mg total) by mouth once daily. 90 tablet 1    potassium chloride SA (K-DUR,KLOR-CON) 20 MEQ tablet Take 1 tablet (20 mEq total) by mouth once daily. 90 tablet 1     No current facility-administered medications on file prior to visit.       Review of Systems   Constitutional: Positive for unexpected weight change. Negative for chills, diaphoresis, fatigue and fever.   HENT: Negative for congestion, ear pain, postnasal drip, sinus pain and sore throat.    Eyes: Negative for pain and redness.   Respiratory: Negative for cough, chest tightness and shortness of breath.    Cardiovascular: Negative for chest pain and leg swelling.   Gastrointestinal: Negative for abdominal pain, constipation, diarrhea, nausea and vomiting.   Genitourinary: Negative for dysuria and hematuria.   Musculoskeletal: Negative for arthralgias and joint swelling.   Skin: Negative for rash.   Neurological: Negative for dizziness,  "syncope and headaches.       Vitals:    05/18/22 1316   BP: 132/62   Pulse: 77   Temp: 98.1 °F (36.7 °C)   Weight: 83.9 kg (185 lb)   Height: 5' 6" (1.676 m)       Wt Readings from Last 3 Encounters:   05/18/22 83.9 kg (185 lb)   05/16/22 85.7 kg (188 lb 15 oz)   05/12/22 85.7 kg (189 lb)       Physical Exam  Constitutional:       General: She is not in acute distress.     Appearance: She is well-developed.   HENT:      Head: Normocephalic and atraumatic.   Pulmonary:      Effort: Pulmonary effort is normal. No respiratory distress.   Musculoskeletal:      Cervical back: Normal range of motion.   Neurological:      Mental Status: She is alert and oriented to person, place, and time.   Psychiatric:         Mood and Affect: Mood normal.         Behavior: Behavior normal.         Thought Content: Thought content normal.         Judgment: Judgment normal.         Health Maintenance   Topic Date Due    DEXA Scan  08/20/2022    Mammogram  05/16/2023    High Dose Statin  05/18/2023    Lipid Panel  05/12/2027    TETANUS VACCINE  03/13/2028    Hepatitis C Screening  Completed       "

## 2022-05-19 NOTE — TELEPHONE ENCOUNTER
Spoke with patient's marina, he reports that he they have been reached out to schedule patient's appointment with [facility] and have decided to remain as scheduled in [place] for 5/26/2022. No further needs reported at this time.

## 2022-05-19 NOTE — NURSING
0955am: Outgoing call to pt regarding [facility] referral per Dr. Rosario for abnormal PET scan. Spoke with pt , Supriya. Introduced myself and gave pt  my direct contact info. Pt scheduled for 5/26 at 1120 am at [place] with Dr. Lee. Pt  given location instructions and directions. Pt  verbalized understanding. Pt plan to report to appt as scheduled.   Oncology Navigation   Intake  Cancer Type:   Internal / External Referral: Internal  Referral Source: Dr. Ximena Rosario  Date of Referral: 5/18/2022  Initial Nurse Navigator Contact: 5/19/2022  Referral to Initial Contact Timeline (days): 1  Date Worked: 5/19/2022  First Appointment Available: 5/26/2022  Appointment Date: 5/26/2022  Schedule to Appointment Timeline (days): 7  First Available Date vs. Scheduled Date (days): 0     Treatment                              Acuity      Follow Up  No follow-ups on file.

## 2022-05-25 NOTE — PROGRESS NOTES
Subjective:   Date of Visit: 5/26/22   ?   ?    REFERRING PROVIDER: Ximena Rosario MD  [id number] [facility] [place] [id number]   ?   CHIEF COMPLAINT: Probable metastatic renal cell carcinoma???????   ?   ONCOLOGIC DIAGNOSIS:  Probable stage for renal cell carcinoma  ?   CURRENT TREATMENT:  None    PAST TREATMENT:  None  ?   ONCOLOGIC HISTORY:     PET scan:  05/17/2022  Chest:     There are innumerable markedly hypermetabolic nodules scattered throughout both lungs highly suspicious for metastatic disease.  A large coalescent mass of nodules in the superior segment of the right lower lobe on image 72 measures 5.0 x 4.4 cm with a max SUV of 12.4.  One of the larger nodules in the left lung in the anterior aspect of the left upper lobe on image 59 measures 2.7 x 2.7 cm with a max SUV of 8.6.  There is extensive hypermetabolic lymphadenopathy with hypermetabolic enlarged nodes in the right paratracheal region, precarinal region, subcarinal region, and bilateral hilar regions.  A precarinal node on image 70 measures 4.8 x 3.9 cm with a max SUV of 7.4.  A subcarinal node on image 82 measures 7.8 x 3.7 cm with a max SUV of 8.1.  No pleural or pericardial effusion are present.     Abdomen/pelvis:     There is a large hypermetabolic mass arising from the lower pole of the left kidney highly suspicious for renal cell carcinoma.  There is central necrosis within this mass.  The mass appears to involve at least a portion of the left renal hilum but does not appear to extend into the left renal vein.  The mass measures 7.5 x 6.8 x 6.0 cm and has a max SUV of 13.0.  There is an adjacent hypermetabolic periaortic retroperitoneal lymph node consistent with neoplastic adenopathy.  On image 139 the node measures 3.4 x 2.8 cm with a max SUV of 6.6.  The adrenal glands are normal.  No ascites is present.  There is a small fat containing umbilical hernia.     Skeleton:     There is diffuse nonfocal hypermetabolism of the bone marrow  predominantly in the axial skeleton of uncertain significance.  This pattern can be seen with various blood dyscrasias and in smokers, but the possibility of diffuse osseous metastatic disease cannot be excluded.     Impression:     1. Large hypermetabolic lower pole left renal mass highly suspicious for renal cell carcinoma.  There is an adjacent enlarged hypermetabolic retroperitoneal lymph node highly suspicious for neoplastic adenopathy.  2. Innumerable markedly hypermetabolic bilateral pulmonary nodules consistent with metastatic disease.  There is also extensive hypermetabolic mediastinal and bilateral pulmonary hilar lymph lymphadenopathy consistent with metastatic disease.  3. Diffuse nonfocal hypermetabolism predominantly within the axial skeleton of uncertain significance.  This pattern can be seen with various blood dyscrasias and in smokers, but the possibility of diffuse osseous metastatic disease cannot be excluded.       HPI:     66-year-old female with history of hyperlipidemia, hypertension, was referred to us by Dr. Rosario due to recently noted abnormal PET scan.  Interestingly patient has recently complained of unintentional weight loss and persistent cough.  Chest x-ray showed multiple pulmonary nodules suspicious for metastasis.  This necessitated PET-CT scan that revealed findings stated above.    Today she continues to complain weight loss of more than 20 lb over the past 6 months, shortness of breath, persistent nonproductive cough. Also had 2 episodes of hematuria. She denies fever, chills or flank pain.    Denies smoking or alcohol use.  No pertinent family history.    Review of Systems   Constitutional: Positive for fatigue. Negative for activity change, appetite change, chills, fever and unexpected weight change.   HENT: Negative for hearing loss, mouth sores, nosebleeds, sore throat, tinnitus, trouble swallowing and voice change.    Eyes: Negative for visual disturbance.   Respiratory:  Negative for cough, chest tightness and shortness of breath.    Cardiovascular: Negative for chest pain, palpitations and leg swelling.   Gastrointestinal: Negative for abdominal pain, anal bleeding, blood in stool, constipation, diarrhea, nausea and vomiting.   Genitourinary: Negative for dysuria, frequency, hematuria, pelvic pain, vaginal bleeding and vaginal pain.   Musculoskeletal: Positive for back pain. Negative for arthralgias, joint swelling and neck pain.   Skin: Negative for color change, pallor, rash and wound.   Allergic/Immunologic: Negative for immunocompromised state.   Neurological: Positive for weakness. Negative for dizziness, tremors, syncope, speech difficulty, light-headedness and headaches.   Hematological: Negative for adenopathy. Does not bruise/bleed easily.   Psychiatric/Behavioral: Negative for agitation, confusion, decreased concentration, hallucinations and sleep disturbance. The patient is not nervous/anxious.        ?   PAST MEDICAL HISTORY:   Past Medical History:   Diagnosis Date    Hyperlipidemia     Hypertension     Stroke     ?     PAST SURGICAL HISTORY:   Past Surgical History:   Procedure Laterality Date    TONSILLECTOMY        ?   ALLERGIES:   Allergies as of 05/26/2022    (No Known Allergies)      ?   MEDICATIONS:?   Outpatient Medications Marked as Taking for the 5/26/22 encounter (Office Visit) with Ghassan Lee MD   Medication Sig Dispense Refill    amLODIPine (NORVASC) 10 MG tablet Take 1 tablet (10 mg total) by mouth once daily. 90 tablet 1    aspirin (ECOTRIN) 81 MG EC tablet Take 1 tablet (81 mg total) by mouth once daily.      atorvastatin (LIPITOR) 80 MG tablet Take 1 tablet (80 mg total) by mouth once daily. 90 tablet 1    carvediloL (COREG) 6.25 MG tablet Take 1 tablet (6.25 mg total) by mouth 2 (two) times daily with meals. 180 tablet 1    cetirizine (ZYRTEC) 10 MG tablet Take 1 tablet (10 mg total) by mouth once daily. 30 tablet 11    clopidogreL  (PLAVIX) 75 mg tablet Take 1 tablet (75 mg total) by mouth once daily. 90 tablet 1    fluticasone propionate (FLONASE) 50 mcg/actuation nasal spray Use 2 sprays to each nostril daily 16 g 0    hydroCHLOROthiazide (HYDRODIURIL) 25 MG tablet Take 1 tablet (25 mg total) by mouth once daily. 90 tablet 1    potassium chloride SA (K-DUR,KLOR-CON) 20 MEQ tablet Take 1 tablet (20 mEq total) by mouth once daily. 90 tablet 1      ?   SOCIAL HISTORY:?   Social History     Tobacco Use    Smoking status: Never Smoker    Smokeless tobacco: Never Used   Substance Use Topics    Alcohol use: No     Alcohol/week: 0.0 standard drinks        ?   FAMILY HISTORY:   family history includes Stroke in her father, mother, and sister.   ?     Objective:      Physical Exam  Constitutional:       General: She is not in acute distress.     Appearance: She is well-developed. She is obese. She is not ill-appearing or toxic-appearing.   HENT:      Head: Normocephalic and atraumatic.      Mouth/Throat:      Pharynx: No oropharyngeal exudate.   Eyes:      General: No scleral icterus.        Right eye: No discharge.         Left eye: No discharge.      Conjunctiva/sclera: Conjunctivae normal.      Pupils: Pupils are equal, round, and reactive to light.   Neck:      Thyroid: No thyromegaly.   Cardiovascular:      Rate and Rhythm: Normal rate and regular rhythm.      Heart sounds: No murmur heard.  Pulmonary:      Effort: Pulmonary effort is normal. No respiratory distress.      Breath sounds: Normal breath sounds.   Chest:      Chest wall: No tenderness.   Breasts:      Right: No supraclavicular adenopathy.      Left: No supraclavicular adenopathy.       Abdominal:      General: Bowel sounds are normal. There is no distension.      Palpations: Abdomen is soft. There is no mass.      Tenderness: There is no abdominal tenderness. There is no guarding or rebound.   Musculoskeletal:         General: No tenderness. Normal range of motion.       Cervical back: Normal range of motion and neck supple.   Lymphadenopathy:      Cervical: No cervical adenopathy.      Right cervical: No superficial cervical adenopathy.     Left cervical: No superficial cervical adenopathy.      Upper Body:      Right upper body: No supraclavicular or pectoral adenopathy.      Left upper body: No supraclavicular or pectoral adenopathy.   Skin:     General: Skin is warm and dry.      Capillary Refill: Capillary refill takes 2 to 3 seconds.      Coloration: Skin is not pale.      Findings: No erythema or rash.   Neurological:      Mental Status: She is alert and oriented to person, place, and time.      Cranial Nerves: No cranial nerve deficit.      Sensory: No sensory deficit.   Psychiatric:         Behavior: Behavior normal. Behavior is cooperative.         Judgment: Judgment normal.         ?   Vitals:    05/26/22 1052   BP: 123/63   Pulse: 62   Temp: 97.9 °F (36.6 °C)      ?     ECOG SCORE    1 - Restricted in strenuous activity-ambulatory and able to carry out work of a light nature         ?   Laboratory:  ?   No visits with results within 1 Day(s) from this visit.   Latest known visit with results is:   Hospital Outpatient Visit on 05/17/2022   Component Date Value Ref Range Status    POC Glucose 05/17/2022 141 (A) 70 - 110 MG/DL Final      ?   Tumor markers   ?   ?   Imaging: NM PET CT Routine Skull to Mid Thigh  Narrative: EXAMINATION:  NM PET CT ROUTINE    CLINICAL HISTORY:  Lung nodule, > 8mm;  Other nonspecific abnormal finding of lung field    66-year-old female with chest radiographs performed on 05/12/2022 demonstrating multiple bilateral pulmonary nodules highly suspicious for metastatic disease.    TECHNIQUE:  Following IV injection of 10.67 mCi F-18 FDG, 3D PET imaging was performed from the skull base to the mid thighs.  A noncontrast non breath hold CT was obtained in conjunction with the PET scan for attenuation correction and anatomic correlation.  PET-CT  fusion images were generated.    COMPARISON:  Correlation is made with chest radiographs dated 05/12/2022.    FINDINGS:  Mediastinal blood pool max SUV: 3.5    Normal hepatic parenchyma max SUV: 4.2    Head/neck:    No significant abnormal hypermetabolic foci are identified within the soft tissues of the head and neck region.  No lymphadenopathy is identified.  The left submandibular gland is absent, possibly on a congenital basis.    Chest:    There are innumerable markedly hypermetabolic nodules scattered throughout both lungs highly suspicious for metastatic disease.  A large coalescent mass of nodules in the superior segment of the right lower lobe on image 72 measures 5.0 x 4.4 cm with a max SUV of 12.4.  One of the larger nodules in the left lung in the anterior aspect of the left upper lobe on image 59 measures 2.7 x 2.7 cm with a max SUV of 8.6.  There is extensive hypermetabolic lymphadenopathy with hypermetabolic enlarged nodes in the right paratracheal region, precarinal region, subcarinal region, and bilateral hilar regions.  A precarinal node on image 70 measures 4.8 x 3.9 cm with a max SUV of 7.4.  A subcarinal node on image 82 measures 7.8 x 3.7 cm with a max SUV of 8.1.  No pleural or pericardial effusion are present.    Abdomen/pelvis:    There is a large hypermetabolic mass arising from the lower pole of the left kidney highly suspicious for renal cell carcinoma.  There is central necrosis within this mass.  The mass appears to involve at least a portion of the left renal hilum but does not appear to extend into the left renal vein.  The mass measures 7.5 x 6.8 x 6.0 cm and has a max SUV of 13.0.  There is an adjacent hypermetabolic periaortic retroperitoneal lymph node consistent with neoplastic adenopathy.  On image 139 the node measures 3.4 x 2.8 cm with a max SUV of 6.6.  The adrenal glands are normal.  No ascites is present.  There is a small fat containing umbilical  hernia.    Skeleton:    There is diffuse nonfocal hypermetabolism of the bone marrow predominantly in the axial skeleton of uncertain significance.  This pattern can be seen with various blood dyscrasias and in smokers, but the possibility of diffuse osseous metastatic disease cannot be excluded.  Impression: 1. Large hypermetabolic lower pole left renal mass highly suspicious for renal cell carcinoma.  There is an adjacent enlarged hypermetabolic retroperitoneal lymph node highly suspicious for neoplastic adenopathy.  2. Innumerable markedly hypermetabolic bilateral pulmonary nodules consistent with metastatic disease.  There is also extensive hypermetabolic mediastinal and bilateral pulmonary hilar lymph lymphadenopathy consistent with metastatic disease.  3. Diffuse nonfocal hypermetabolism predominantly within the axial skeleton of uncertain significance.  This pattern can be seen with various blood dyscrasias and in smokers, but the possibility of diffuse osseous metastatic disease cannot be excluded.    Electronically signed by: Donnie Law MD  Date:    05/17/2022  Time:    12:15     ?      Pathology:  Pathology Results  (Last 10 years)    None           ?   Assessment/Plan:       1. Abnormal PET scan, lung    2. Malignant neoplasm of left kidney          Abnormal PET scan, lung  Probable metastatic renal cell carcinoma vs lymphoma.  Reviewed PET scan with patient and answered her questions.  Will proceed with MRI of brain for complete staging.  Will also place order for biopsy retroperitoneal lymph node and right lower lobe lung mass.  Will also place referral to Urology for evaluation.    Return in 3 weeks with repeat labs or sooner if needed.      ?Abnormal PET scan, lung  -     CBC Auto Differential; Future; Expected date: 05/26/2022  -     Comprehensive Metabolic Panel; Future; Expected date: 05/26/2022  -     Urinalysis; Future; Expected date: 05/26/2022  -     CBC Auto Differential; Future;  Expected date: 05/26/2022  -     Comprehensive Metabolic Panel; Future; Expected date: 05/26/2022  -     Ambulatory referral/consult to Urology; Future; Expected date: 06/02/2022  -     IR Biopsy Abdominal/Retroperitoneal; Future; Expected date: 05/26/2022  -     Ambulatory referral/consult to Pulmonology; Future; Expected date: 06/02/2022  -     MRI BRAIN W WO CONTRAST; Future; Expected date: 05/26/2022    Malignant neoplasm of left kidney  -     CBC Auto Differential; Future; Expected date: 05/26/2022  -     Comprehensive Metabolic Panel; Future; Expected date: 05/26/2022  -     Urinalysis; Future; Expected date: 05/26/2022  -     CBC Auto Differential; Future; Expected date: 05/26/2022  -     Comprehensive Metabolic Panel; Future; Expected date: 05/26/2022  -     Ambulatory referral/consult to Urology; Future; Expected date: 06/02/2022  -     IR Biopsy Abdominal/Retroperitoneal; Future; Expected date: 05/26/2022  -     Ambulatory referral/consult to Pulmonology; Future; Expected date: 06/02/2022  -     Ambulatory referral/consult to Hematology / Oncology  -     MRI BRAIN W WO CONTRAST; Future; Expected date: 05/26/2022      Follow-Up: Follow up in about 3 weeks (around 6/16/2022).    KAITLIN BOGGS Md., Ph.D  Hematology & Oncology Department  Phone #: [phone]

## 2022-05-25 NOTE — H&P (VIEW-ONLY)
Subjective:   Date of Visit: 5/26/22   ?   ?    REFERRING PROVIDER: Ximena Rosario MD  [id number] [facility] [place] [address]   ?   CHIEF COMPLAINT: Probable metastatic renal cell carcinoma???????   ?   ONCOLOGIC DIAGNOSIS:  Probable stage for renal cell carcinoma  ?   CURRENT TREATMENT:  None    PAST TREATMENT:  None  ?   ONCOLOGIC HISTORY:     PET scan:  05/17/2022  Chest:     There are innumerable markedly hypermetabolic nodules scattered throughout both lungs highly suspicious for metastatic disease.  A large coalescent mass of nodules in the superior segment of the right lower lobe on image 72 measures 5.0 x 4.4 cm with a max SUV of 12.4.  One of the larger nodules in the left lung in the anterior aspect of the left upper lobe on image 59 measures 2.7 x 2.7 cm with a max SUV of 8.6.  There is extensive hypermetabolic lymphadenopathy with hypermetabolic enlarged nodes in the right paratracheal region, precarinal region, subcarinal region, and bilateral hilar regions.  A precarinal node on image 70 measures 4.8 x 3.9 cm with a max SUV of 7.4.  A subcarinal node on image 82 measures 7.8 x 3.7 cm with a max SUV of 8.1.  No pleural or pericardial effusion are present.     Abdomen/pelvis:     There is a large hypermetabolic mass arising from the lower pole of the left kidney highly suspicious for renal cell carcinoma.  There is central necrosis within this mass.  The mass appears to involve at least a portion of the left renal hilum but does not appear to extend into the left renal vein.  The mass measures 7.5 x 6.8 x 6.0 cm and has a max SUV of 13.0.  There is an adjacent hypermetabolic periaortic retroperitoneal lymph node consistent with neoplastic adenopathy.  On image 139 the node measures 3.4 x 2.8 cm with a max SUV of 6.6.  The adrenal glands are normal.  No ascites is present.  There is a small fat containing umbilical hernia.     Skeleton:     There is diffuse nonfocal hypermetabolism of the bone marrow  predominantly in the axial skeleton of uncertain significance.  This pattern can be seen with various blood dyscrasias and in smokers, but the possibility of diffuse osseous metastatic disease cannot be excluded.     Impression:     1. Large hypermetabolic lower pole left renal mass highly suspicious for renal cell carcinoma.  There is an adjacent enlarged hypermetabolic retroperitoneal lymph node highly suspicious for neoplastic adenopathy.  2. Innumerable markedly hypermetabolic bilateral pulmonary nodules consistent with metastatic disease.  There is also extensive hypermetabolic mediastinal and bilateral pulmonary hilar lymph lymphadenopathy consistent with metastatic disease.  3. Diffuse nonfocal hypermetabolism predominantly within the axial skeleton of uncertain significance.  This pattern can be seen with various blood dyscrasias and in smokers, but the possibility of diffuse osseous metastatic disease cannot be excluded.       HPI:     66-year-old female with history of hyperlipidemia, hypertension, was referred to us by Dr. Rosario due to recently noted abnormal PET scan.  Interestingly patient has recently complained of unintentional weight loss and persistent cough.  Chest x-ray showed multiple pulmonary nodules suspicious for metastasis.  This necessitated PET-CT scan that revealed findings stated above.    Today she continues to complain weight loss of more than 20 lb over the past 6 months, shortness of breath, persistent nonproductive cough. Also had 2 episodes of hematuria. She denies fever, chills or flank pain.    Denies smoking or alcohol use.  No pertinent family history.    Review of Systems   Constitutional: Positive for fatigue. Negative for activity change, appetite change, chills, fever and unexpected weight change.   HENT: Negative for hearing loss, mouth sores, nosebleeds, sore throat, tinnitus, trouble swallowing and voice change.    Eyes: Negative for visual disturbance.   Respiratory:  Negative for cough, chest tightness and shortness of breath.    Cardiovascular: Negative for chest pain, palpitations and leg swelling.   Gastrointestinal: Negative for abdominal pain, anal bleeding, blood in stool, constipation, diarrhea, nausea and vomiting.   Genitourinary: Negative for dysuria, frequency, hematuria, pelvic pain, vaginal bleeding and vaginal pain.   Musculoskeletal: Positive for back pain. Negative for arthralgias, joint swelling and neck pain.   Skin: Negative for color change, pallor, rash and wound.   Allergic/Immunologic: Negative for immunocompromised state.   Neurological: Positive for weakness. Negative for dizziness, tremors, syncope, speech difficulty, light-headedness and headaches.   Hematological: Negative for adenopathy. Does not bruise/bleed easily.   Psychiatric/Behavioral: Negative for agitation, confusion, decreased concentration, hallucinations and sleep disturbance. The patient is not nervous/anxious.        ?   PAST MEDICAL HISTORY:   Past Medical History:   Diagnosis Date    Hyperlipidemia     Hypertension     Stroke     ?     PAST SURGICAL HISTORY:   Past Surgical History:   Procedure Laterality Date    TONSILLECTOMY        ?   ALLERGIES:   Allergies as of 05/26/2022    (No Known Allergies)      ?   MEDICATIONS:?   Outpatient Medications Marked as Taking for the 5/26/22 encounter (Office Visit) with Ghassan Lee MD   Medication Sig Dispense Refill    amLODIPine (NORVASC) 10 MG tablet Take 1 tablet (10 mg total) by mouth once daily. 90 tablet 1    aspirin (ECOTRIN) 81 MG EC tablet Take 1 tablet (81 mg total) by mouth once daily.      atorvastatin (LIPITOR) 80 MG tablet Take 1 tablet (80 mg total) by mouth once daily. 90 tablet 1    carvediloL (COREG) 6.25 MG tablet Take 1 tablet (6.25 mg total) by mouth 2 (two) times daily with meals. 180 tablet 1    cetirizine (ZYRTEC) 10 MG tablet Take 1 tablet (10 mg total) by mouth once daily. 30 tablet 11    clopidogreL  (PLAVIX) 75 mg tablet Take 1 tablet (75 mg total) by mouth once daily. 90 tablet 1    fluticasone propionate (FLONASE) 50 mcg/actuation nasal spray Use 2 sprays to each nostril daily 16 g 0    hydroCHLOROthiazide (HYDRODIURIL) 25 MG tablet Take 1 tablet (25 mg total) by mouth once daily. 90 tablet 1    potassium chloride SA (K-DUR,KLOR-CON) 20 MEQ tablet Take 1 tablet (20 mEq total) by mouth once daily. 90 tablet 1      ?   SOCIAL HISTORY:?   Social History     Tobacco Use    Smoking status: Never Smoker    Smokeless tobacco: Never Used   Substance Use Topics    Alcohol use: No     Alcohol/week: 0.0 standard drinks        ?   FAMILY HISTORY:   family history includes Stroke in her father, mother, and sister.   ?     Objective:      Physical Exam  Constitutional:       General: She is not in acute distress.     Appearance: She is well-developed. She is obese. She is not ill-appearing or toxic-appearing.   HENT:      Head: Normocephalic and atraumatic.      Mouth/Throat:      Pharynx: No oropharyngeal exudate.   Eyes:      General: No scleral icterus.        Right eye: No discharge.         Left eye: No discharge.      Conjunctiva/sclera: Conjunctivae normal.      Pupils: Pupils are equal, round, and reactive to light.   Neck:      Thyroid: No thyromegaly.   Cardiovascular:      Rate and Rhythm: Normal rate and regular rhythm.      Heart sounds: No murmur heard.  Pulmonary:      Effort: Pulmonary effort is normal. No respiratory distress.      Breath sounds: Normal breath sounds.   Chest:      Chest wall: No tenderness.   Breasts:      Right: No supraclavicular adenopathy.      Left: No supraclavicular adenopathy.       Abdominal:      General: Bowel sounds are normal. There is no distension.      Palpations: Abdomen is soft. There is no mass.      Tenderness: There is no abdominal tenderness. There is no guarding or rebound.   Musculoskeletal:         General: No tenderness. Normal range of motion.       Cervical back: Normal range of motion and neck supple.   Lymphadenopathy:      Cervical: No cervical adenopathy.      Right cervical: No superficial cervical adenopathy.     Left cervical: No superficial cervical adenopathy.      Upper Body:      Right upper body: No supraclavicular or pectoral adenopathy.      Left upper body: No supraclavicular or pectoral adenopathy.   Skin:     General: Skin is warm and dry.      Capillary Refill: Capillary refill takes 2 to 3 seconds.      Coloration: Skin is not pale.      Findings: No erythema or rash.   Neurological:      Mental Status: She is alert and oriented to person, place, and time.      Cranial Nerves: No cranial nerve deficit.      Sensory: No sensory deficit.   Psychiatric:         Behavior: Behavior normal. Behavior is cooperative.         Judgment: Judgment normal.         ?   Vitals:    05/26/22 1052   BP: 123/63   Pulse: 62   Temp: 97.9 °F (36.6 °C)      ?     ECOG SCORE    1 - Restricted in strenuous activity-ambulatory and able to carry out work of a light nature         ?   Laboratory:  ?   No visits with results within 1 Day(s) from this visit.   Latest known visit with results is:   Hospital Outpatient Visit on 05/17/2022   Component Date Value Ref Range Status    POC Glucose 05/17/2022 141 (A) 70 - 110 MG/DL Final      ?   Tumor markers   ?   ?   Imaging: NM PET CT Routine Skull to Mid Thigh  Narrative: EXAMINATION:  NM PET CT ROUTINE    CLINICAL HISTORY:  Lung nodule, > 8mm;  Other nonspecific abnormal finding of lung field    66-year-old female with chest radiographs performed on 05/12/2022 demonstrating multiple bilateral pulmonary nodules highly suspicious for metastatic disease.    TECHNIQUE:  Following IV injection of 10.67 mCi F-18 FDG, 3D PET imaging was performed from the skull base to the mid thighs.  A noncontrast non breath hold CT was obtained in conjunction with the PET scan for attenuation correction and anatomic correlation.  PET-CT  fusion images were generated.    COMPARISON:  Correlation is made with chest radiographs dated 05/12/2022.    FINDINGS:  Mediastinal blood pool max SUV: 3.5    Normal hepatic parenchyma max SUV: 4.2    Head/neck:    No significant abnormal hypermetabolic foci are identified within the soft tissues of the head and neck region.  No lymphadenopathy is identified.  The left submandibular gland is absent, possibly on a congenital basis.    Chest:    There are innumerable markedly hypermetabolic nodules scattered throughout both lungs highly suspicious for metastatic disease.  A large coalescent mass of nodules in the superior segment of the right lower lobe on image 72 measures 5.0 x 4.4 cm with a max SUV of 12.4.  One of the larger nodules in the left lung in the anterior aspect of the left upper lobe on image 59 measures 2.7 x 2.7 cm with a max SUV of 8.6.  There is extensive hypermetabolic lymphadenopathy with hypermetabolic enlarged nodes in the right paratracheal region, precarinal region, subcarinal region, and bilateral hilar regions.  A precarinal node on image 70 measures 4.8 x 3.9 cm with a max SUV of 7.4.  A subcarinal node on image 82 measures 7.8 x 3.7 cm with a max SUV of 8.1.  No pleural or pericardial effusion are present.    Abdomen/pelvis:    There is a large hypermetabolic mass arising from the lower pole of the left kidney highly suspicious for renal cell carcinoma.  There is central necrosis within this mass.  The mass appears to involve at least a portion of the left renal hilum but does not appear to extend into the left renal vein.  The mass measures 7.5 x 6.8 x 6.0 cm and has a max SUV of 13.0.  There is an adjacent hypermetabolic periaortic retroperitoneal lymph node consistent with neoplastic adenopathy.  On image 139 the node measures 3.4 x 2.8 cm with a max SUV of 6.6.  The adrenal glands are normal.  No ascites is present.  There is a small fat containing umbilical  hernia.    Skeleton:    There is diffuse nonfocal hypermetabolism of the bone marrow predominantly in the axial skeleton of uncertain significance.  This pattern can be seen with various blood dyscrasias and in smokers, but the possibility of diffuse osseous metastatic disease cannot be excluded.  Impression: 1. Large hypermetabolic lower pole left renal mass highly suspicious for renal cell carcinoma.  There is an adjacent enlarged hypermetabolic retroperitoneal lymph node highly suspicious for neoplastic adenopathy.  2. Innumerable markedly hypermetabolic bilateral pulmonary nodules consistent with metastatic disease.  There is also extensive hypermetabolic mediastinal and bilateral pulmonary hilar lymph lymphadenopathy consistent with metastatic disease.  3. Diffuse nonfocal hypermetabolism predominantly within the axial skeleton of uncertain significance.  This pattern can be seen with various blood dyscrasias and in smokers, but the possibility of diffuse osseous metastatic disease cannot be excluded.    Electronically signed by: Donnie Law MD  Date:    05/17/2022  Time:    12:15     ?      Pathology:  Pathology Results  (Last 10 years)    None           ?   Assessment/Plan:       1. Abnormal PET scan, lung    2. Malignant neoplasm of left kidney          Abnormal PET scan, lung  Probable metastatic renal cell carcinoma vs lymphoma.  Reviewed PET scan with patient and answered her questions.  Will proceed with MRI of brain for complete staging.  Will also place order for biopsy retroperitoneal lymph node and right lower lobe lung mass.  Will also place referral to Urology for evaluation.    Return in 3 weeks with repeat labs or sooner if needed.      ?Abnormal PET scan, lung  -     CBC Auto Differential; Future; Expected date: 05/26/2022  -     Comprehensive Metabolic Panel; Future; Expected date: 05/26/2022  -     Urinalysis; Future; Expected date: 05/26/2022  -     CBC Auto Differential; Future;  Expected date: 05/26/2022  -     Comprehensive Metabolic Panel; Future; Expected date: 05/26/2022  -     Ambulatory referral/consult to Urology; Future; Expected date: 06/02/2022  -     IR Biopsy Abdominal/Retroperitoneal; Future; Expected date: 05/26/2022  -     Ambulatory referral/consult to Pulmonology; Future; Expected date: 06/02/2022  -     MRI BRAIN W WO CONTRAST; Future; Expected date: 05/26/2022    Malignant neoplasm of left kidney  -     CBC Auto Differential; Future; Expected date: 05/26/2022  -     Comprehensive Metabolic Panel; Future; Expected date: 05/26/2022  -     Urinalysis; Future; Expected date: 05/26/2022  -     CBC Auto Differential; Future; Expected date: 05/26/2022  -     Comprehensive Metabolic Panel; Future; Expected date: 05/26/2022  -     Ambulatory referral/consult to Urology; Future; Expected date: 06/02/2022  -     IR Biopsy Abdominal/Retroperitoneal; Future; Expected date: 05/26/2022  -     Ambulatory referral/consult to Pulmonology; Future; Expected date: 06/02/2022  -     Ambulatory referral/consult to Hematology / Oncology  -     MRI BRAIN W WO CONTRAST; Future; Expected date: 05/26/2022      Follow-Up: Follow up in about 3 weeks (around 6/16/2022).    KAITLIN BOGGS Md., Ph.D  Hematology & Oncology Department  Phone #: [phone]

## 2022-05-26 ENCOUNTER — OFFICE VISIT (OUTPATIENT)
Dept: HEMATOLOGY/ONCOLOGY | Facility: CLINIC | Age: 67
End: 2022-05-26
Payer: MEDICARE

## 2022-05-26 ENCOUNTER — OFFICE VISIT (OUTPATIENT)
Dept: SLEEP MEDICINE | Facility: CLINIC | Age: 67
End: 2022-05-26
Payer: MEDICARE

## 2022-05-26 ENCOUNTER — TELEPHONE (OUTPATIENT)
Dept: SLEEP MEDICINE | Facility: CLINIC | Age: 67
End: 2022-05-26

## 2022-05-26 ENCOUNTER — LAB VISIT (OUTPATIENT)
Dept: LAB | Facility: HOSPITAL | Age: 67
End: 2022-05-26
Attending: INTERNAL MEDICINE
Payer: MEDICARE

## 2022-05-26 VITALS
TEMPERATURE: 98 F | HEIGHT: 66 IN | DIASTOLIC BLOOD PRESSURE: 63 MMHG | SYSTOLIC BLOOD PRESSURE: 123 MMHG | OXYGEN SATURATION: 96 % | WEIGHT: 184.31 LBS | HEART RATE: 62 BPM | BODY MASS INDEX: 29.62 KG/M2

## 2022-05-26 VITALS
HEIGHT: 66 IN | RESPIRATION RATE: 17 BRPM | WEIGHT: 185.88 LBS | SYSTOLIC BLOOD PRESSURE: 122 MMHG | HEART RATE: 74 BPM | BODY MASS INDEX: 29.87 KG/M2 | OXYGEN SATURATION: 93 % | DIASTOLIC BLOOD PRESSURE: 80 MMHG

## 2022-05-26 DIAGNOSIS — C64.2 MALIGNANT NEOPLASM OF LEFT KIDNEY: ICD-10-CM

## 2022-05-26 DIAGNOSIS — R94.2 ABNORMAL PET SCAN, LUNG: ICD-10-CM

## 2022-05-26 DIAGNOSIS — N39.0 URINARY TRACT INFECTION WITHOUT HEMATURIA, SITE UNSPECIFIED: Primary | ICD-10-CM

## 2022-05-26 LAB
ALBUMIN SERPL BCP-MCNC: 2.9 G/DL (ref 3.5–5.2)
ALP SERPL-CCNC: 108 U/L (ref 55–135)
ALT SERPL W/O P-5'-P-CCNC: 9 U/L (ref 10–44)
ANION GAP SERPL CALC-SCNC: 9 MMOL/L (ref 8–16)
AST SERPL-CCNC: 21 U/L (ref 10–40)
BASOPHILS # BLD AUTO: 0.1 K/UL (ref 0–0.2)
BASOPHILS NFR BLD: 0.7 % (ref 0–1.9)
BILIRUB SERPL-MCNC: 0.9 MG/DL (ref 0.1–1)
BUN SERPL-MCNC: 15 MG/DL (ref 8–23)
CALCIUM SERPL-MCNC: 11.1 MG/DL (ref 8.7–10.5)
CHLORIDE SERPL-SCNC: 103 MMOL/L (ref 95–110)
CO2 SERPL-SCNC: 26 MMOL/L (ref 23–29)
CREAT SERPL-MCNC: 1 MG/DL (ref 0.5–1.4)
DIFFERENTIAL METHOD: ABNORMAL
EOSINOPHIL # BLD AUTO: 0.2 K/UL (ref 0–0.5)
EOSINOPHIL NFR BLD: 1.5 % (ref 0–8)
ERYTHROCYTE [DISTWIDTH] IN BLOOD BY AUTOMATED COUNT: 17.2 % (ref 11.5–14.5)
EST. GFR  (AFRICAN AMERICAN): >60 ML/MIN/1.73 M^2
EST. GFR  (NON AFRICAN AMERICAN): 58 ML/MIN/1.73 M^2
GLUCOSE SERPL-MCNC: 140 MG/DL (ref 70–110)
HCT VFR BLD AUTO: 41.8 % (ref 37–48.5)
HGB BLD-MCNC: 12.8 G/DL (ref 12–16)
IMM GRANULOCYTES # BLD AUTO: 0.07 K/UL (ref 0–0.04)
IMM GRANULOCYTES NFR BLD AUTO: 0.5 % (ref 0–0.5)
LYMPHOCYTES # BLD AUTO: 1.5 K/UL (ref 1–4.8)
LYMPHOCYTES NFR BLD: 10.1 % (ref 18–48)
MCH RBC QN AUTO: 26 PG (ref 27–31)
MCHC RBC AUTO-ENTMCNC: 30.6 G/DL (ref 32–36)
MCV RBC AUTO: 85 FL (ref 82–98)
MONOCYTES # BLD AUTO: 0.7 K/UL (ref 0.3–1)
MONOCYTES NFR BLD: 4.9 % (ref 4–15)
NEUTROPHILS # BLD AUTO: 11.8 K/UL (ref 1.8–7.7)
NEUTROPHILS NFR BLD: 82.3 % (ref 38–73)
NRBC BLD-RTO: 0 /100 WBC
PLATELET # BLD AUTO: 483 K/UL (ref 150–450)
PMV BLD AUTO: 9.4 FL (ref 9.2–12.9)
POTASSIUM SERPL-SCNC: 3.8 MMOL/L (ref 3.5–5.1)
PROT SERPL-MCNC: 7.6 G/DL (ref 6–8.4)
RBC # BLD AUTO: 4.92 M/UL (ref 4–5.4)
SODIUM SERPL-SCNC: 138 MMOL/L (ref 136–145)
WBC # BLD AUTO: 14.3 K/UL (ref 3.9–12.7)

## 2022-05-26 PROCEDURE — 99999 PR PBB SHADOW E&M-EST. PATIENT-LVL IV: ICD-10-PCS | Mod: PBBFAC,,, | Performed by: INTERNAL MEDICINE

## 2022-05-26 PROCEDURE — 99999 PR PBB SHADOW E&M-EST. PATIENT-LVL V: CPT | Mod: PBBFAC,,, | Performed by: INTERNAL MEDICINE

## 2022-05-26 PROCEDURE — 3074F SYST BP LT 130 MM HG: CPT | Mod: CPTII,S$GLB,, | Performed by: INTERNAL MEDICINE

## 2022-05-26 PROCEDURE — 3008F BODY MASS INDEX DOCD: CPT | Mod: CPTII,S$GLB,, | Performed by: INTERNAL MEDICINE

## 2022-05-26 PROCEDURE — 3288F FALL RISK ASSESSMENT DOCD: CPT | Mod: CPTII,S$GLB,, | Performed by: INTERNAL MEDICINE

## 2022-05-26 PROCEDURE — 3008F PR BODY MASS INDEX (BMI) DOCUMENTED: ICD-10-PCS | Mod: CPTII,S$GLB,, | Performed by: INTERNAL MEDICINE

## 2022-05-26 PROCEDURE — 1159F PR MEDICATION LIST DOCUMENTED IN MEDICAL RECORD: ICD-10-PCS | Mod: CPTII,S$GLB,, | Performed by: INTERNAL MEDICINE

## 2022-05-26 PROCEDURE — 99205 OFFICE O/P NEW HI 60 MIN: CPT | Mod: S$GLB,,, | Performed by: INTERNAL MEDICINE

## 2022-05-26 PROCEDURE — 3078F DIAST BP <80 MM HG: CPT | Mod: CPTII,S$GLB,, | Performed by: INTERNAL MEDICINE

## 2022-05-26 PROCEDURE — 1160F PR REVIEW ALL MEDS BY PRESCRIBER/CLIN PHARMACIST DOCUMENTED: ICD-10-PCS | Mod: CPTII,S$GLB,, | Performed by: INTERNAL MEDICINE

## 2022-05-26 PROCEDURE — 99999 PR PBB SHADOW E&M-EST. PATIENT-LVL V: ICD-10-PCS | Mod: PBBFAC,,, | Performed by: INTERNAL MEDICINE

## 2022-05-26 PROCEDURE — 3079F DIAST BP 80-89 MM HG: CPT | Mod: CPTII,S$GLB,, | Performed by: INTERNAL MEDICINE

## 2022-05-26 PROCEDURE — 1126F PR PAIN SEVERITY QUANTIFIED, NO PAIN PRESENT: ICD-10-PCS | Mod: CPTII,S$GLB,, | Performed by: INTERNAL MEDICINE

## 2022-05-26 PROCEDURE — 3288F PR FALLS RISK ASSESSMENT DOCUMENTED: ICD-10-PCS | Mod: CPTII,S$GLB,, | Performed by: INTERNAL MEDICINE

## 2022-05-26 PROCEDURE — 99999 PR PBB SHADOW E&M-EST. PATIENT-LVL IV: CPT | Mod: PBBFAC,,, | Performed by: INTERNAL MEDICINE

## 2022-05-26 PROCEDURE — 99205 PR OFFICE/OUTPT VISIT, NEW, LEVL V, 60-74 MIN: ICD-10-PCS | Mod: S$GLB,,, | Performed by: INTERNAL MEDICINE

## 2022-05-26 PROCEDURE — 1101F PT FALLS ASSESS-DOCD LE1/YR: CPT | Mod: CPTII,S$GLB,, | Performed by: INTERNAL MEDICINE

## 2022-05-26 PROCEDURE — 3079F PR MOST RECENT DIASTOLIC BLOOD PRESSURE 80-89 MM HG: ICD-10-PCS | Mod: CPTII,S$GLB,, | Performed by: INTERNAL MEDICINE

## 2022-05-26 PROCEDURE — 3078F PR MOST RECENT DIASTOLIC BLOOD PRESSURE < 80 MM HG: ICD-10-PCS | Mod: CPTII,S$GLB,, | Performed by: INTERNAL MEDICINE

## 2022-05-26 PROCEDURE — 36415 COLL VENOUS BLD VENIPUNCTURE: CPT | Performed by: INTERNAL MEDICINE

## 2022-05-26 PROCEDURE — 80053 COMPREHEN METABOLIC PANEL: CPT | Performed by: INTERNAL MEDICINE

## 2022-05-26 PROCEDURE — 3044F HG A1C LEVEL LT 7.0%: CPT | Mod: CPTII,S$GLB,, | Performed by: INTERNAL MEDICINE

## 2022-05-26 PROCEDURE — 1101F PR PT FALLS ASSESS DOC 0-1 FALLS W/OUT INJ PAST YR: ICD-10-PCS | Mod: CPTII,S$GLB,, | Performed by: INTERNAL MEDICINE

## 2022-05-26 PROCEDURE — 1160F RVW MEDS BY RX/DR IN RCRD: CPT | Mod: CPTII,S$GLB,, | Performed by: INTERNAL MEDICINE

## 2022-05-26 PROCEDURE — 1159F MED LIST DOCD IN RCRD: CPT | Mod: CPTII,S$GLB,, | Performed by: INTERNAL MEDICINE

## 2022-05-26 PROCEDURE — 3074F PR MOST RECENT SYSTOLIC BLOOD PRESSURE < 130 MM HG: ICD-10-PCS | Mod: CPTII,S$GLB,, | Performed by: INTERNAL MEDICINE

## 2022-05-26 PROCEDURE — 85025 COMPLETE CBC W/AUTO DIFF WBC: CPT | Performed by: INTERNAL MEDICINE

## 2022-05-26 PROCEDURE — 3044F PR MOST RECENT HEMOGLOBIN A1C LEVEL <7.0%: ICD-10-PCS | Mod: CPTII,S$GLB,, | Performed by: INTERNAL MEDICINE

## 2022-05-26 PROCEDURE — 1126F AMNT PAIN NOTED NONE PRSNT: CPT | Mod: CPTII,S$GLB,, | Performed by: INTERNAL MEDICINE

## 2022-05-26 RX ORDER — CIPROFLOXACIN 250 MG/1
250 TABLET, FILM COATED ORAL 2 TIMES DAILY
Qty: 20 TABLET | Refills: 0 | Status: SHIPPED | OUTPATIENT
Start: 2022-05-26 | End: 2022-06-05

## 2022-05-26 NOTE — H&P (VIEW-ONLY)
Pulmonary Outpatient   Visit     Subjective:       Patient ID: Loyda Montgomery is a 66 y.o. female.    Chief Complaint: EBUS       Loyda Montgomery is 66 y.o.  Here with spouse and Daughter Michelle  Asked to see for EBUS. Ghassan Lee MD  [address] [place] [address]   Recent significant weight loss 30 lbs, non productive cough, fatigue  No smoking  denies inductrial exposures  CXR was abn : innumerable nodules  PET CT reviewed  Also recent hematuria  She is on Aspirin and plavix  Hx of 2 strokes   A subcarinal node on image 82 measures 7.8 x 3.7 cm with a max SUV of 8.1  A precarinal node on image 70 measures 4.8 x 3.9 cm with a max SUV of 7.4.   Large hypermetabolic lower pole left renal mass highly suspicious for renal cell carcinoma.      Imaging reviewed with patient  Clinical and radiological pattern compatible with metastatic neoplasm  Renal Biopsy has been ordered        The following portions of the patient's history were reviewed and updated as appropriate:   She  has a past medical history of Hyperlipidemia, Hypertension, and Stroke.  She does not have any pertinent problems on file.  She  has a past surgical history that includes Tonsillectomy.  Her family history includes Stroke in her father, mother, and sister.  She  reports that she has never smoked. She has never used smokeless tobacco. She reports that she does not drink alcohol and does not use drugs.  She has a current medication list which includes the following prescription(s): amlodipine, aspirin, atorvastatin, carvedilol, cetirizine, ciprofloxacin hcl, clopidogrel, fluticasone propionate, hydrochlorothiazide, and potassium chloride sa.  Current Outpatient Medications on File Prior to Visit   Medication Sig Dispense Refill    amLODIPine (NORVASC) 10 MG tablet Take 1 tablet (10 mg total) by mouth once daily. 90 tablet 1    aspirin (ECOTRIN) 81 MG EC tablet Take 1 tablet (81 mg  total) by mouth once daily.      atorvastatin (LIPITOR) 80 MG tablet Take 1 tablet (80 mg total) by mouth once daily. 90 tablet 1    carvediloL (COREG) 6.25 MG tablet Take 1 tablet (6.25 mg total) by mouth 2 (two) times daily with meals. 180 tablet 1    cetirizine (ZYRTEC) 10 MG tablet Take 1 tablet (10 mg total) by mouth once daily. 30 tablet 11    clopidogreL (PLAVIX) 75 mg tablet Take 1 tablet (75 mg total) by mouth once daily. 90 tablet 1    fluticasone propionate (FLONASE) 50 mcg/actuation nasal spray Use 2 sprays to each nostril daily 16 g 0    hydroCHLOROthiazide (HYDRODIURIL) 25 MG tablet Take 1 tablet (25 mg total) by mouth once daily. 90 tablet 1    potassium chloride SA (K-DUR,KLOR-CON) 20 MEQ tablet Take 1 tablet (20 mEq total) by mouth once daily. 90 tablet 1     No current facility-administered medications on file prior to visit.     She has No Known Allergies..     Review of Systems   Constitutional: Positive for weight loss, activity change, appetite change and weakness.   Eyes: Negative.    Respiratory: Positive for cough.    Cardiovascular: Negative.    Genitourinary: Negative.    Endocrine: endocrine negative   Musculoskeletal: Negative.    Skin: Negative.    Gastrointestinal: Negative.    Psychiatric/Behavioral: Negative.    All other systems reviewed and are negative.      Outpatient Encounter Medications as of 5/26/2022   Medication Sig Dispense Refill    amLODIPine (NORVASC) 10 MG tablet Take 1 tablet (10 mg total) by mouth once daily. 90 tablet 1    aspirin (ECOTRIN) 81 MG EC tablet Take 1 tablet (81 mg total) by mouth once daily.      atorvastatin (LIPITOR) 80 MG tablet Take 1 tablet (80 mg total) by mouth once daily. 90 tablet 1    carvediloL (COREG) 6.25 MG tablet Take 1 tablet (6.25 mg total) by mouth 2 (two) times daily with meals. 180 tablet 1    cetirizine (ZYRTEC) 10 MG tablet Take 1 tablet (10 mg total) by mouth once daily. 30 tablet 11    ciprofloxacin HCl (CIPRO) 250  "MG tablet Take 1 tablet (250 mg total) by mouth 2 (two) times daily. for 10 days 20 tablet 0    clopidogreL (PLAVIX) 75 mg tablet Take 1 tablet (75 mg total) by mouth once daily. 90 tablet 1    fluticasone propionate (FLONASE) 50 mcg/actuation nasal spray Use 2 sprays to each nostril daily 16 g 0    hydroCHLOROthiazide (HYDRODIURIL) 25 MG tablet Take 1 tablet (25 mg total) by mouth once daily. 90 tablet 1    potassium chloride SA (K-DUR,KLOR-CON) 20 MEQ tablet Take 1 tablet (20 mEq total) by mouth once daily. 90 tablet 1     No facility-administered encounter medications on file as of 5/26/2022.       Objective:     Vital Signs (Most Recent)  Vital Signs  Pulse: 74  Resp: 17  SpO2: (!) 93 %  BP: 122/80  Height and Weight  Height: 5' 6" (167.6 cm)  Weight: 84.3 kg (185 lb 13.6 oz)  BSA (Calculated - sq m): 1.98 sq meters  BMI (Calculated): 30  Weight in (lb) to have BMI = 25: 154.6]  Wt Readings from Last 2 Encounters:   05/26/22 84.3 kg (185 lb 13.6 oz)   05/26/22 83.6 kg (184 lb 4.9 oz)       Physical Exam   Constitutional: She is oriented to person, place, and time. She appears well-developed and well-nourished.   HENT:   Head: Normocephalic.   Mouth/Throat: Mallampati Score: II.   Neck: No JVD present.   Cardiovascular: Normal rate and intact distal pulses.   No murmur heard.  Pulmonary/Chest: Normal expansion, effort normal and breath sounds normal.   Abdominal: Soft. Bowel sounds are normal.   Musculoskeletal:         General: No edema. Normal range of motion.      Cervical back: Normal range of motion and neck supple.   Lymphadenopathy:     She has no axillary adenopathy.   Neurological: She is alert and oriented to person, place, and time.   Skin: Skin is warm and dry.   Psychiatric: She has a normal mood and affect.   Nursing note and vitals reviewed.      Laboratory  Lab Results   Component Value Date    WBC 14.30 (H) 05/26/2022    RBC 4.92 05/26/2022    HGB 12.8 05/26/2022    HCT 41.8 05/26/2022    MCV " 85 05/26/2022    MCH 26.0 (L) 05/26/2022    MCHC 30.6 (L) 05/26/2022    RDW 17.2 (H) 05/26/2022     (H) 05/26/2022    MPV 9.4 05/26/2022    GRAN 11.8 (H) 05/26/2022    GRAN 82.3 (H) 05/26/2022    LYMPH 1.5 05/26/2022    LYMPH 10.1 (L) 05/26/2022    MONO 0.7 05/26/2022    MONO 4.9 05/26/2022    EOS 0.2 05/26/2022    BASO 0.10 05/26/2022    EOSINOPHIL 1.5 05/26/2022    BASOPHIL 0.7 05/26/2022       BMP  Lab Results   Component Value Date     05/26/2022    K 3.8 05/26/2022     05/26/2022    CO2 26 05/26/2022    BUN 15 05/26/2022    CREATININE 1.0 05/26/2022    CALCIUM 11.1 (H) 05/26/2022    ANIONGAP 9 05/26/2022    ESTGFRAFRICA >60 05/26/2022    EGFRNONAA 58 (A) 05/26/2022    AST 21 05/26/2022    ALT 9 (L) 05/26/2022    PROT 7.6 05/26/2022       No results found for: BNP    Lab Results   Component Value Date    TSH 1.930 05/12/2022       No results found for: SEDRATE    No results found for: CRP  No results found for: IGE     No results found for: ASPERGILLUS  No results found for: AFUMIGATUSCL     No results found for: ACE     Diagnostic Results:  I have personally reviewed today the following studies:          NM PET CT Routine Skull to Mid Thigh  Narrative: EXAMINATION:  NM PET CT ROUTINE    CLINICAL HISTORY:  Lung nodule, > 8mm;  Other nonspecific abnormal finding of lung field    66-year-old female with chest radiographs performed on 05/12/2022 demonstrating multiple bilateral pulmonary nodules highly suspicious for metastatic disease.    TECHNIQUE:  Following IV injection of 10.67 mCi F-18 FDG, 3D PET imaging was performed from the skull base to the mid thighs.  A noncontrast non breath hold CT was obtained in conjunction with the PET scan for attenuation correction and anatomic correlation.  PET-CT fusion images were generated.    COMPARISON:  Correlation is made with chest radiographs dated 05/12/2022.    FINDINGS:  Mediastinal blood pool max SUV: 3.5    Normal hepatic parenchyma max SUV:  4.2    Head/neck:    No significant abnormal hypermetabolic foci are identified within the soft tissues of the head and neck region.  No lymphadenopathy is identified.  The left submandibular gland is absent, possibly on a congenital basis.    Chest:    There are innumerable markedly hypermetabolic nodules scattered throughout both lungs highly suspicious for metastatic disease.  A large coalescent mass of nodules in the superior segment of the right lower lobe on image 72 measures 5.0 x 4.4 cm with a max SUV of 12.4.  One of the larger nodules in the left lung in the anterior aspect of the left upper lobe on image 59 measures 2.7 x 2.7 cm with a max SUV of 8.6.  There is extensive hypermetabolic lymphadenopathy with hypermetabolic enlarged nodes in the right paratracheal region, precarinal region, subcarinal region, and bilateral hilar regions.  A precarinal node on image 70 measures 4.8 x 3.9 cm with a max SUV of 7.4.  A subcarinal node on image 82 measures 7.8 x 3.7 cm with a max SUV of 8.1.  No pleural or pericardial effusion are present.    Abdomen/pelvis:    There is a large hypermetabolic mass arising from the lower pole of the left kidney highly suspicious for renal cell carcinoma.  There is central necrosis within this mass.  The mass appears to involve at least a portion of the left renal hilum but does not appear to extend into the left renal vein.  The mass measures 7.5 x 6.8 x 6.0 cm and has a max SUV of 13.0.  There is an adjacent hypermetabolic periaortic retroperitoneal lymph node consistent with neoplastic adenopathy.  On image 139 the node measures 3.4 x 2.8 cm with a max SUV of 6.6.  The adrenal glands are normal.  No ascites is present.  There is a small fat containing umbilical hernia.    Skeleton:    There is diffuse nonfocal hypermetabolism of the bone marrow predominantly in the axial skeleton of uncertain significance.  This pattern can be seen with various blood dyscrasias and in smokers,  but the possibility of diffuse osseous metastatic disease cannot be excluded.  Impression: 1. Large hypermetabolic lower pole left renal mass highly suspicious for renal cell carcinoma.  There is an adjacent enlarged hypermetabolic retroperitoneal lymph node highly suspicious for neoplastic adenopathy.  2. Innumerable markedly hypermetabolic bilateral pulmonary nodules consistent with metastatic disease.  There is also extensive hypermetabolic mediastinal and bilateral pulmonary hilar lymph lymphadenopathy consistent with metastatic disease.  3. Diffuse nonfocal hypermetabolism predominantly within the axial skeleton of uncertain significance.  This pattern can be seen with various blood dyscrasias and in smokers, but the possibility of diffuse osseous metastatic disease cannot be excluded.    Electronically signed by: Donnie Law MD  Date:    05/17/2022  Time:    12:15          Assessment/Plan:     Problem List Items Addressed This Visit     Abnormal PET scan, lung    Relevant Orders    Case Request Endoscopy: Bronchoscopy - insert lighted tube into airway to take a biopsy of lung (Completed)      Other Visit Diagnoses     Malignant neoplasm of left kidney        Relevant Orders    Case Request Endoscopy: Bronchoscopy - insert lighted tube into airway to take a biopsy of lung (Completed)        My diagnostic impression and work-up plan were discussed at length with patient. Risks were discussed. BRONCH EBUS procedure. Complications of the procedure discussed in detail with patient. Complications including but not limited to infection that may require hospital admission, bleeding that may require blood transfusion and or hospital admission, perforation of the lung which may require surgery,chance of injury to the throat, windpipe or bronchial tubes, laryngospam, coughing, aspiration, hypoxemia or cardiac arrythmias. Patient expressed and verbalized understanding. The material risks of anesthesia in connection  with the procedure including brain damage, paralysis from the neck downwards, paralysis from the waist downwards, loss of function of an arm or a leg, disfigurement (incluing scars)and death were discussed with patient who expressedand verbalized understanding. Alternate treatments and material risks associated with such alternatives were discussed with pateint. These include radiologic surveillance with minimal risk and sugery with an indeterminate risk. The material risks of refusing the procedure was discussed in detail. This includes no diagnosis or confirmation of diagnisis and rendering of appropriate treatment the risk of which depends on the nature of the diagnosed illness. Patient expressed and verbalized understanding. Procedure scheduled for date TBD. Consent signed. Orders entered. Coagulation studies per orders.   All questions were answered and the patient expressed understanding       Will need sampling Station 7 , 4, 10    Follow up in about 4 weeks (around 6/23/2022), or bronch, will need to be off plavix for EBUS.    This note was prepared using voice recognition system and is likely to have sound alike errors that may have been overlooked even after proof reading.  Please call me with any questions    Discussed diagnosis, its evaluation, treatment and usual course. All questions answered.      Michi Ceron MD

## 2022-05-26 NOTE — TELEPHONE ENCOUNTER
----- Message from Ximena Rosario MD sent at 5/26/2022  5:42 PM CDT -----  Yes, ok to hold.  ----- Message -----  From: Michi Ceron MD  Sent: 5/26/2022   4:54 PM CDT  To: Ximena Rosario MD    Is it okay to hold asp and plavix for procedures?

## 2022-05-26 NOTE — PATIENT INSTRUCTIONS
PRE-Procedure INSTRUCTIONS   *Arrive at     am / pm (2 hours before your scheduled procedure time). This arrival time will allow us time to prepare you for your  procedure on (day)  At(time)   On(date)   Check in at Endoscopy Lab desk on the 5th Floor of [facility] located at [facility] and [address] ([address] [address]). You DO NOT check in on the first floor for this procedure.   Please read the following instructions carefully before your appointment.   ALL PATIENTS:   Plan to be at the hospital for approximately 4-6 hours.   You must have a responsible person who can drive you to the hospital, stay while the procedure is being done, assume responsibility for your care at discharge, and drive you home. If not, your exam will be canceled.   Please leave all valuables at home, including jewelry. You will need to bring your s license and medical insurance card.   Also, you will be responsible for any co-payment at time of registration.   You will be sedated for the procedure. Due to the sedation you will not be able to operate a vehicle or sign any legal documentation for 24 hours after exam.   Wear loose and comfortable clothing and wear socks if you are cold natured.   Bring all medications (in original containers) that you are currently taking, or a complete list.   To prepare for this test, you MAY NOT have anything to eat or drink after midnight, not even water, unless you take any necessary medications as listed in # 1 below and then a sip of water with those medications is allowed.   1 - If you take BLOOD PRESSURE, HEART, SEIZURE or PSYCHIATRIC MEDICATIONS in the morning, please take them the morning of your procedure.   Please take these medications as soon as you awaken with a small sip of water ... please make sure they are taken before you leave to come to the hospital for your procedure.   On the day before your procedure, take all of your regular  scheduled medications except for the blood thinning medications discussed below.   2 - If you are DIABETIC:   Check blood sugar levels on the morning of the exam and/or as needed if you feel hypoglycemic (low blood sugar).   DO NOT TAKE any diabetic medications (including insulin) the morning of the exam.   If your blood sugar goes below 70, you may drink 4 ounces of juice, soda or eat a piece of hard candy. Wait 15 minutes then recheck your blood sugar. If it isn't going up, you may drink another 4 ounces and contact your Primary Care doctor or our office. Please do not have any liquids within 2 hours of your arrival time.   3 - STOP BLOOD THINNING MEDICATION, Aspirin, Ibuprofen, Naproxen, etc as listed below:   Coumadin, Plavix, Aspirin, NSAIDs (nonsteroidal anti-inflammatory drugs)  and fish oil.   If on Coumadin or Plavix, notify the prescribing physician that it is being temporarily discontinued for procedure.   Coumadin must be stopped 3 days prior to exam.   Plavix, Aspirin and NSAIDs (see above) must be stopped 7 DAYS PRIOR TO EXAM.   If you are on a blood thinner not listed, please contact your physician about stop times. Such as Aggrenox, Brilinta, Effient, Pradaxa, Xaralto, etc.   Avoid Smoking for 24 hours prior to the test!   Endoscopy Pre-Procedure Nursing Call Line [phone]   For Insurance or Financial obligations, call [phone]   [facility] Endoscopy Unit Nursing Line [phone]

## 2022-05-26 NOTE — PROGRESS NOTES
Pulmonary Outpatient   Visit     Subjective:       Patient ID: Loyda Montgomery is a 66 y.o. female.    Chief Complaint: EBUS       Loyda Montgomery is 66 y.o.  Here with spouse and Daughter Michelle  Asked to see for EBUS. Ghassan Lee MD  [id number] [place] [address]   Recent significant weight loss 30 lbs, non productive cough, fatigue  No smoking  denies inductrial exposures  CXR was abn : innumerable nodules  PET CT reviewed  Also recent hematuria  She is on Aspirin and plavix  Hx of 2 strokes   A subcarinal node on image 82 measures 7.8 x 3.7 cm with a max SUV of 8.1  A precarinal node on image 70 measures 4.8 x 3.9 cm with a max SUV of 7.4.   Large hypermetabolic lower pole left renal mass highly suspicious for renal cell carcinoma.      Imaging reviewed with patient  Clinical and radiological pattern compatible with metastatic neoplasm  Renal Biopsy has been ordered        The following portions of the patient's history were reviewed and updated as appropriate:   She  has a past medical history of Hyperlipidemia, Hypertension, and Stroke.  She does not have any pertinent problems on file.  She  has a past surgical history that includes Tonsillectomy.  Her family history includes Stroke in her father, mother, and sister.  She  reports that she has never smoked. She has never used smokeless tobacco. She reports that she does not drink alcohol and does not use drugs.  She has a current medication list which includes the following prescription(s): amlodipine, aspirin, atorvastatin, carvedilol, cetirizine, ciprofloxacin hcl, clopidogrel, fluticasone propionate, hydrochlorothiazide, and potassium chloride sa.  Current Outpatient Medications on File Prior to Visit   Medication Sig Dispense Refill    amLODIPine (NORVASC) 10 MG tablet Take 1 tablet (10 mg total) by mouth once daily. 90 tablet 1    aspirin (ECOTRIN) 81 MG EC tablet Take 1 tablet (81 mg  total) by mouth once daily.      atorvastatin (LIPITOR) 80 MG tablet Take 1 tablet (80 mg total) by mouth once daily. 90 tablet 1    carvediloL (COREG) 6.25 MG tablet Take 1 tablet (6.25 mg total) by mouth 2 (two) times daily with meals. 180 tablet 1    cetirizine (ZYRTEC) 10 MG tablet Take 1 tablet (10 mg total) by mouth once daily. 30 tablet 11    clopidogreL (PLAVIX) 75 mg tablet Take 1 tablet (75 mg total) by mouth once daily. 90 tablet 1    fluticasone propionate (FLONASE) 50 mcg/actuation nasal spray Use 2 sprays to each nostril daily 16 g 0    hydroCHLOROthiazide (HYDRODIURIL) 25 MG tablet Take 1 tablet (25 mg total) by mouth once daily. 90 tablet 1    potassium chloride SA (K-DUR,KLOR-CON) 20 MEQ tablet Take 1 tablet (20 mEq total) by mouth once daily. 90 tablet 1     No current facility-administered medications on file prior to visit.     She has No Known Allergies..     Review of Systems   Constitutional: Positive for weight loss, activity change, appetite change and weakness.   Eyes: Negative.    Respiratory: Positive for cough.    Cardiovascular: Negative.    Genitourinary: Negative.    Endocrine: endocrine negative   Musculoskeletal: Negative.    Skin: Negative.    Gastrointestinal: Negative.    Psychiatric/Behavioral: Negative.    All other systems reviewed and are negative.      Outpatient Encounter Medications as of 5/26/2022   Medication Sig Dispense Refill    amLODIPine (NORVASC) 10 MG tablet Take 1 tablet (10 mg total) by mouth once daily. 90 tablet 1    aspirin (ECOTRIN) 81 MG EC tablet Take 1 tablet (81 mg total) by mouth once daily.      atorvastatin (LIPITOR) 80 MG tablet Take 1 tablet (80 mg total) by mouth once daily. 90 tablet 1    carvediloL (COREG) 6.25 MG tablet Take 1 tablet (6.25 mg total) by mouth 2 (two) times daily with meals. 180 tablet 1    cetirizine (ZYRTEC) 10 MG tablet Take 1 tablet (10 mg total) by mouth once daily. 30 tablet 11    ciprofloxacin HCl (CIPRO) 250  "MG tablet Take 1 tablet (250 mg total) by mouth 2 (two) times daily. for 10 days 20 tablet 0    clopidogreL (PLAVIX) 75 mg tablet Take 1 tablet (75 mg total) by mouth once daily. 90 tablet 1    fluticasone propionate (FLONASE) 50 mcg/actuation nasal spray Use 2 sprays to each nostril daily 16 g 0    hydroCHLOROthiazide (HYDRODIURIL) 25 MG tablet Take 1 tablet (25 mg total) by mouth once daily. 90 tablet 1    potassium chloride SA (K-DUR,KLOR-CON) 20 MEQ tablet Take 1 tablet (20 mEq total) by mouth once daily. 90 tablet 1     No facility-administered encounter medications on file as of 5/26/2022.       Objective:     Vital Signs (Most Recent)  Vital Signs  Pulse: 74  Resp: 17  SpO2: (!) 93 %  BP: 122/80  Height and Weight  Height: 5' 6" (167.6 cm)  Weight: 84.3 kg (185 lb 13.6 oz)  BSA (Calculated - sq m): 1.98 sq meters  BMI (Calculated): 30  Weight in (lb) to have BMI = 25: 154.6]  Wt Readings from Last 2 Encounters:   05/26/22 84.3 kg (185 lb 13.6 oz)   05/26/22 83.6 kg (184 lb 4.9 oz)       Physical Exam   Constitutional: She is oriented to person, place, and time. She appears well-developed and well-nourished.   HENT:   Head: Normocephalic.   Mouth/Throat: Mallampati Score: II.   Neck: No JVD present.   Cardiovascular: Normal rate and intact distal pulses.   No murmur heard.  Pulmonary/Chest: Normal expansion, effort normal and breath sounds normal.   Abdominal: Soft. Bowel sounds are normal.   Musculoskeletal:         General: No edema. Normal range of motion.      Cervical back: Normal range of motion and neck supple.   Lymphadenopathy:     She has no axillary adenopathy.   Neurological: She is alert and oriented to person, place, and time.   Skin: Skin is warm and dry.   Psychiatric: She has a normal mood and affect.   Nursing note and vitals reviewed.      Laboratory  Lab Results   Component Value Date    WBC 14.30 (H) 05/26/2022    RBC 4.92 05/26/2022    HGB 12.8 05/26/2022    HCT 41.8 05/26/2022    MCV " 85 05/26/2022    MCH 26.0 (L) 05/26/2022    MCHC 30.6 (L) 05/26/2022    RDW 17.2 (H) 05/26/2022     (H) 05/26/2022    MPV 9.4 05/26/2022    GRAN 11.8 (H) 05/26/2022    GRAN 82.3 (H) 05/26/2022    LYMPH 1.5 05/26/2022    LYMPH 10.1 (L) 05/26/2022    MONO 0.7 05/26/2022    MONO 4.9 05/26/2022    EOS 0.2 05/26/2022    BASO 0.10 05/26/2022    EOSINOPHIL 1.5 05/26/2022    BASOPHIL 0.7 05/26/2022       BMP  Lab Results   Component Value Date     05/26/2022    K 3.8 05/26/2022     05/26/2022    CO2 26 05/26/2022    BUN 15 05/26/2022    CREATININE 1.0 05/26/2022    CALCIUM 11.1 (H) 05/26/2022    ANIONGAP 9 05/26/2022    ESTGFRAFRICA >60 05/26/2022    EGFRNONAA 58 (A) 05/26/2022    AST 21 05/26/2022    ALT 9 (L) 05/26/2022    PROT 7.6 05/26/2022       No results found for: BNP    Lab Results   Component Value Date    TSH 1.930 05/12/2022       No results found for: SEDRATE    No results found for: CRP  No results found for: IGE     No results found for: ASPERGILLUS  No results found for: AFUMIGATUSCL     No results found for: ACE     Diagnostic Results:  I have personally reviewed today the following studies:          NM PET CT Routine Skull to Mid Thigh  Narrative: EXAMINATION:  NM PET CT ROUTINE    CLINICAL HISTORY:  Lung nodule, > 8mm;  Other nonspecific abnormal finding of lung field    66-year-old female with chest radiographs performed on 05/12/2022 demonstrating multiple bilateral pulmonary nodules highly suspicious for metastatic disease.    TECHNIQUE:  Following IV injection of 10.67 mCi F-18 FDG, 3D PET imaging was performed from the skull base to the mid thighs.  A noncontrast non breath hold CT was obtained in conjunction with the PET scan for attenuation correction and anatomic correlation.  PET-CT fusion images were generated.    COMPARISON:  Correlation is made with chest radiographs dated 05/12/2022.    FINDINGS:  Mediastinal blood pool max SUV: 3.5    Normal hepatic parenchyma max SUV:  4.2    Head/neck:    No significant abnormal hypermetabolic foci are identified within the soft tissues of the head and neck region.  No lymphadenopathy is identified.  The left submandibular gland is absent, possibly on a congenital basis.    Chest:    There are innumerable markedly hypermetabolic nodules scattered throughout both lungs highly suspicious for metastatic disease.  A large coalescent mass of nodules in the superior segment of the right lower lobe on image 72 measures 5.0 x 4.4 cm with a max SUV of 12.4.  One of the larger nodules in the left lung in the anterior aspect of the left upper lobe on image 59 measures 2.7 x 2.7 cm with a max SUV of 8.6.  There is extensive hypermetabolic lymphadenopathy with hypermetabolic enlarged nodes in the right paratracheal region, precarinal region, subcarinal region, and bilateral hilar regions.  A precarinal node on image 70 measures 4.8 x 3.9 cm with a max SUV of 7.4.  A subcarinal node on image 82 measures 7.8 x 3.7 cm with a max SUV of 8.1.  No pleural or pericardial effusion are present.    Abdomen/pelvis:    There is a large hypermetabolic mass arising from the lower pole of the left kidney highly suspicious for renal cell carcinoma.  There is central necrosis within this mass.  The mass appears to involve at least a portion of the left renal hilum but does not appear to extend into the left renal vein.  The mass measures 7.5 x 6.8 x 6.0 cm and has a max SUV of 13.0.  There is an adjacent hypermetabolic periaortic retroperitoneal lymph node consistent with neoplastic adenopathy.  On image 139 the node measures 3.4 x 2.8 cm with a max SUV of 6.6.  The adrenal glands are normal.  No ascites is present.  There is a small fat containing umbilical hernia.    Skeleton:    There is diffuse nonfocal hypermetabolism of the bone marrow predominantly in the axial skeleton of uncertain significance.  This pattern can be seen with various blood dyscrasias and in smokers,  but the possibility of diffuse osseous metastatic disease cannot be excluded.  Impression: 1. Large hypermetabolic lower pole left renal mass highly suspicious for renal cell carcinoma.  There is an adjacent enlarged hypermetabolic retroperitoneal lymph node highly suspicious for neoplastic adenopathy.  2. Innumerable markedly hypermetabolic bilateral pulmonary nodules consistent with metastatic disease.  There is also extensive hypermetabolic mediastinal and bilateral pulmonary hilar lymph lymphadenopathy consistent with metastatic disease.  3. Diffuse nonfocal hypermetabolism predominantly within the axial skeleton of uncertain significance.  This pattern can be seen with various blood dyscrasias and in smokers, but the possibility of diffuse osseous metastatic disease cannot be excluded.    Electronically signed by: Donnie Law MD  Date:    05/17/2022  Time:    12:15          Assessment/Plan:     Problem List Items Addressed This Visit     Abnormal PET scan, lung    Relevant Orders    Case Request Endoscopy: Bronchoscopy - insert lighted tube into airway to take a biopsy of lung (Completed)      Other Visit Diagnoses     Malignant neoplasm of left kidney        Relevant Orders    Case Request Endoscopy: Bronchoscopy - insert lighted tube into airway to take a biopsy of lung (Completed)        My diagnostic impression and work-up plan were discussed at length with patient. Risks were discussed. BRONCH EBUS procedure. Complications of the procedure discussed in detail with patient. Complications including but not limited to infection that may require hospital admission, bleeding that may require blood transfusion and or hospital admission, perforation of the lung which may require surgery,chance of injury to the throat, windpipe or bronchial tubes, laryngospam, coughing, aspiration, hypoxemia or cardiac arrythmias. Patient expressed and verbalized understanding. The material risks of anesthesia in connection  with the procedure including brain damage, paralysis from the neck downwards, paralysis from the waist downwards, loss of function of an arm or a leg, disfigurement (incluing scars)and death were discussed with patient who expressedand verbalized understanding. Alternate treatments and material risks associated with such alternatives were discussed with pateint. These include radiologic surveillance with minimal risk and sugery with an indeterminate risk. The material risks of refusing the procedure was discussed in detail. This includes no diagnosis or confirmation of diagnisis and rendering of appropriate treatment the risk of which depends on the nature of the diagnosed illness. Patient expressed and verbalized understanding. Procedure scheduled for date TBD. Consent signed. Orders entered. Coagulation studies per orders.   All questions were answered and the patient expressed understanding       Will need sampling Station 7 , 4, 10    Follow up in about 4 weeks (around 6/23/2022), or bronch, will need to be off plavix for EBUS.    This note was prepared using voice recognition system and is likely to have sound alike errors that may have been overlooked even after proof reading.  Please call me with any questions    Discussed diagnosis, its evaluation, treatment and usual course. All questions answered.      Michi Ceron MD

## 2022-05-26 NOTE — ASSESSMENT & PLAN NOTE
Probable metastatic renal cell carcinoma vs lymphoma.  Reviewed PET scan with patient and answered her questions.  Will proceed with MRI of brain for complete staging.  Will also place order for biopsy retroperitoneal lymph node and right lower lobe lung mass.  Will also place referral to Urology for evaluation.    Return in 3 weeks with repeat labs or sooner if needed.

## 2022-05-27 ENCOUNTER — TELEPHONE (OUTPATIENT)
Dept: HEMATOLOGY/ONCOLOGY | Facility: CLINIC | Age: 67
End: 2022-05-27
Payer: MEDICARE

## 2022-05-27 ENCOUNTER — TELEPHONE (OUTPATIENT)
Dept: FAMILY MEDICINE | Facility: CLINIC | Age: 67
End: 2022-05-27
Payer: MEDICARE

## 2022-05-27 ENCOUNTER — PATIENT MESSAGE (OUTPATIENT)
Dept: FAMILY MEDICINE | Facility: CLINIC | Age: 67
End: 2022-05-27
Payer: MEDICARE

## 2022-05-27 NOTE — TELEPHONE ENCOUNTER
----- Message from Ronan Cardenas sent at 5/27/2022 10:28 AM CDT -----  Contact: pt pia - [place]  Is calling rg pt being summoned for Jury Duty and can be reached at [phone]/./[email or web address]

## 2022-05-27 NOTE — TELEPHONE ENCOUNTER
Spoke with patient's . He will be dropping off letter sent to them summoning patient for jury duty. Patient will need an excuse.

## 2022-05-27 NOTE — LETTER
[facility]  [address] [place] [address]  Phone: [phone]  Fax: [phone] May 27, 2022    Loyda Montgomery  [address] [place] [address]     Juror Number: [id number]    To Whom It May Concern:    Loyda Montgomery is unable to participate in jury duty due to on-going evaluation of probable malignant renal mass & multiple pulmonary nodules suspicious for metastatic cancer.    Please excuse her from any upcoming trials as she will be unable to appear due to her current medical condition.    If you have any questions or concerns, please feel free to call my office.    Sincerely,        Ximena Rosario MD

## 2022-05-27 NOTE — TELEPHONE ENCOUNTER
Patient has received jury summons, I have drafted an excuse. Please review if ok to print.    Patient's  will  letter and excuse when ready.

## 2022-05-31 ENCOUNTER — HOSPITAL ENCOUNTER (OUTPATIENT)
Dept: RADIOLOGY | Facility: HOSPITAL | Age: 67
Discharge: HOME | End: 2022-05-31
Attending: INTERNAL MEDICINE
Payer: MEDICARE

## 2022-05-31 DIAGNOSIS — R94.2 ABNORMAL PET SCAN, LUNG: ICD-10-CM

## 2022-05-31 DIAGNOSIS — C64.2 MALIGNANT NEOPLASM OF LEFT KIDNEY: ICD-10-CM

## 2022-05-31 PROCEDURE — 70553 MRI BRAIN STEM W/O & W/DYE: CPT | Mod: TC,PO

## 2022-05-31 PROCEDURE — 70553 MRI BRAIN STEM W/O & W/DYE: CPT | Mod: 26,,, | Performed by: RADIOLOGY

## 2022-05-31 PROCEDURE — 70553 MRI BRAIN W WO CONTRAST: ICD-10-PCS | Mod: 26,,, | Performed by: RADIOLOGY

## 2022-05-31 PROCEDURE — 25500020 PHARM REV CODE 255: Mod: PO | Performed by: INTERNAL MEDICINE

## 2022-05-31 PROCEDURE — A9585 GADOBUTROL INJECTION: HCPCS | Mod: PO | Performed by: INTERNAL MEDICINE

## 2022-05-31 RX ORDER — GADOBUTROL 604.72 MG/ML
8 INJECTION INTRAVENOUS
Status: COMPLETED | OUTPATIENT
Start: 2022-05-31 | End: 2022-05-31

## 2022-05-31 RX ADMIN — GADOBUTROL 8 ML: 604.72 INJECTION INTRAVENOUS at 03:05

## 2022-06-01 ENCOUNTER — PATIENT MESSAGE (OUTPATIENT)
Dept: HEMATOLOGY/ONCOLOGY | Facility: CLINIC | Age: 67
End: 2022-06-01
Payer: MEDICARE

## 2022-06-02 ENCOUNTER — TELEPHONE (OUTPATIENT)
Dept: RADIOLOGY | Facility: HOSPITAL | Age: 67
End: 2022-06-02
Payer: MEDICARE

## 2022-06-02 DIAGNOSIS — D68.9 COAGULATION DEFECT, UNSPECIFIED: Primary | ICD-10-CM

## 2022-06-02 NOTE — TELEPHONE ENCOUNTER
Called pt to confirm radiology procedure appointment 6/3/22 at 08:30.  Advised pt and her spouse to arrive to [facility] on [address] at 07:30 and be NPO after midnight. Ok to take BP meds in AM with small sip of water. Pt and her spouse verbalized understanding and all questions answered. Confirmed with patient last doses of both plavix and aspirin were on 5/26/22. Confirmed with patient they have a ride home post-procedure.

## 2022-06-03 ENCOUNTER — HOSPITAL ENCOUNTER (OUTPATIENT)
Dept: RADIOLOGY | Facility: HOSPITAL | Age: 67
Discharge: HOME | End: 2022-06-03
Attending: INTERNAL MEDICINE
Payer: MEDICARE

## 2022-06-03 VITALS
SYSTOLIC BLOOD PRESSURE: 143 MMHG | OXYGEN SATURATION: 98 % | DIASTOLIC BLOOD PRESSURE: 58 MMHG | RESPIRATION RATE: 20 BRPM | HEIGHT: 66 IN | BODY MASS INDEX: 28.93 KG/M2 | HEART RATE: 69 BPM | WEIGHT: 180 LBS

## 2022-06-03 DIAGNOSIS — R94.2 ABNORMAL PET SCAN, LUNG: ICD-10-CM

## 2022-06-03 DIAGNOSIS — C64.2 MALIGNANT NEOPLASM OF LEFT KIDNEY: ICD-10-CM

## 2022-06-03 LAB
CTP QC/QA: YES
SARS-COV-2 AG RESP QL IA.RAPID: NEGATIVE

## 2022-06-03 PROCEDURE — 74150 CT ABDOMEN W/O CONTRAST: CPT | Mod: TC

## 2022-06-03 NOTE — DISCHARGE SUMMARY
Dr. Zelaya spoke with Dr Ceron and Dr Lee and it was felt that bronchoscopy would be the next best procedure for diagnosis which is scheduled for Monday.

## 2022-06-03 NOTE — NURSING
KRISTIN Orta spoke with patient and her spouse regarding cancellation of procedure today. PIV safely removed. Pt dressed and transported via w/c to main entrance. Pt stable at time of discharge.

## 2022-06-03 NOTE — PLAN OF CARE
Pt transported via w/c from waiting room to pre-procedure area due to activity intolerance. Pt is Aox4. Pt denies pain and is resting comfortably. VS taken and stable. Will continue to monitor.

## 2022-06-03 NOTE — SEDATION DOCUMENTATION
Patient will not have a procedure today, per Dr. Zelaya we may do the procedure after the bronchoscopy.  Patient back to post recovery area

## 2022-06-04 ENCOUNTER — PATIENT MESSAGE (OUTPATIENT)
Dept: HEMATOLOGY/ONCOLOGY | Facility: CLINIC | Age: 67
End: 2022-06-04
Payer: MEDICARE

## 2022-06-04 ENCOUNTER — OFFICE VISIT (OUTPATIENT)
Dept: UROLOGY | Facility: CLINIC | Age: 67
End: 2022-06-04
Payer: MEDICARE

## 2022-06-04 ENCOUNTER — PATIENT MESSAGE (OUTPATIENT)
Dept: ENDOSCOPY | Facility: HOSPITAL | Age: 67
End: 2022-06-04
Payer: MEDICARE

## 2022-06-04 VITALS — BODY MASS INDEX: 28.93 KG/M2 | HEIGHT: 66 IN | WEIGHT: 180 LBS

## 2022-06-04 DIAGNOSIS — R94.2 ABNORMAL PET SCAN, LUNG: ICD-10-CM

## 2022-06-04 DIAGNOSIS — C64.2 MALIGNANT NEOPLASM OF LEFT KIDNEY: Primary | ICD-10-CM

## 2022-06-04 PROCEDURE — 1101F PR PT FALLS ASSESS DOC 0-1 FALLS W/OUT INJ PAST YR: ICD-10-PCS | Mod: CPTII,S$GLB,, | Performed by: UROLOGY

## 2022-06-04 PROCEDURE — 3008F PR BODY MASS INDEX (BMI) DOCUMENTED: ICD-10-PCS | Mod: CPTII,S$GLB,, | Performed by: UROLOGY

## 2022-06-04 PROCEDURE — 3008F BODY MASS INDEX DOCD: CPT | Mod: CPTII,S$GLB,, | Performed by: UROLOGY

## 2022-06-04 PROCEDURE — 1126F AMNT PAIN NOTED NONE PRSNT: CPT | Mod: CPTII,S$GLB,, | Performed by: UROLOGY

## 2022-06-04 PROCEDURE — 1126F PR PAIN SEVERITY QUANTIFIED, NO PAIN PRESENT: ICD-10-PCS | Mod: CPTII,S$GLB,, | Performed by: UROLOGY

## 2022-06-04 PROCEDURE — 1159F MED LIST DOCD IN RCRD: CPT | Mod: CPTII,S$GLB,, | Performed by: UROLOGY

## 2022-06-04 PROCEDURE — 3288F FALL RISK ASSESSMENT DOCD: CPT | Mod: CPTII,S$GLB,, | Performed by: UROLOGY

## 2022-06-04 PROCEDURE — 99203 OFFICE O/P NEW LOW 30 MIN: CPT | Mod: S$GLB,,, | Performed by: UROLOGY

## 2022-06-04 PROCEDURE — 3044F HG A1C LEVEL LT 7.0%: CPT | Mod: CPTII,S$GLB,, | Performed by: UROLOGY

## 2022-06-04 PROCEDURE — 3044F PR MOST RECENT HEMOGLOBIN A1C LEVEL <7.0%: ICD-10-PCS | Mod: CPTII,S$GLB,, | Performed by: UROLOGY

## 2022-06-04 PROCEDURE — 99203 PR OFFICE/OUTPT VISIT, NEW, LEVL III, 30-44 MIN: ICD-10-PCS | Mod: S$GLB,,, | Performed by: UROLOGY

## 2022-06-04 PROCEDURE — 1159F PR MEDICATION LIST DOCUMENTED IN MEDICAL RECORD: ICD-10-PCS | Mod: CPTII,S$GLB,, | Performed by: UROLOGY

## 2022-06-04 PROCEDURE — 99999 PR PBB SHADOW E&M-EST. PATIENT-LVL IV: CPT | Mod: PBBFAC,,, | Performed by: UROLOGY

## 2022-06-04 PROCEDURE — 1101F PT FALLS ASSESS-DOCD LE1/YR: CPT | Mod: CPTII,S$GLB,, | Performed by: UROLOGY

## 2022-06-04 PROCEDURE — 99999 PR PBB SHADOW E&M-EST. PATIENT-LVL IV: ICD-10-PCS | Mod: PBBFAC,,, | Performed by: UROLOGY

## 2022-06-04 PROCEDURE — 3288F PR FALLS RISK ASSESSMENT DOCUMENTED: ICD-10-PCS | Mod: CPTII,S$GLB,, | Performed by: UROLOGY

## 2022-06-04 NOTE — PROGRESS NOTES
Chief Complaint:   Encounter Diagnoses   Name Primary?    Malignant neoplasm of left kidney Yes    Abnormal PET scan, lung        HPI:  66-year-old female who comes in with newly diagnosed left renal mass.  Initially diagnosed after cough was found a chest x-ray demonstrated some pulmonary lesions.  Further workup demonstrated enlarged lymph nodes, now possible area within the frontal bone on her skull a large right renal mass.  Referral from Hematology-Oncology, no previous urological history, no gynecological history.  She has all of her female organs, no incontinence.  Mild urge incontinence she states that is okay and not needing medical management.  No history of slings, no recurrent UTIs, recent gross hematuria times to few weeks ago, no history of smoking, no family history of urological cancers or stones.    Allergies:  Patient has no known allergies.    Medications:  has a current medication list which includes the following prescription(s): amlodipine, atorvastatin, carvedilol, cetirizine, ciprofloxacin hcl, fluticasone propionate, hydrochlorothiazide, potassium chloride sa, aspirin, and clopidogrel.    Review of Systems:  General: No fever, chills, fatigability, or weight loss.  Skin: No rashes, itching, or changes in color or texture of skin.  Chest: Denies TIM, cyanosis, wheezing, cough, and sputum production.  Abdomen: Appetite fine. No weight loss. Denies diarrhea, abdominal pain, hematemesis, or blood in stool.  Musculoskeletal: No joint stiffness or swelling. Denies back pain.  : As above.  All other review of systems negative.    PMH:   has a past medical history of Hyperlipidemia, Hypertension, and Stroke.    PSH:   has a past surgical history that includes Tonsillectomy.    FamHx: family history includes Stroke in her father, mother, and sister.    SocHx:  reports that she has never smoked. She has never used smokeless tobacco. She reports that she does not drink alcohol and does not use  drugs.      Physical Exam:  There were no vitals filed for this visit.  General: A&Ox3, no apparent distress, no deformities  Neck: No masses, normal ROM  Lungs: normal inspiration, no use of accessory muscles  Heart: normal pulse, no arrhythmias  Abdomen: Soft, NT, ND, no masses, no hernias, no hepatosplenomegaly  Skin: The skin is warm and dry. No jaundice.  Ext: No c/c/e.    Labs/Studies:   MRI brain left frontal bone enhancement 5/22  PET/CT 7.5 cm left renal lesion highly suspicious for renal cell carcinoma, hypermetabolic bilateral pulmonary nodules possibly consistent with metastatic disease, mediastinal and pulmonary hilar lymphadenopathy, possibly consistent with metastatic disease possible osseous metastatic disease 5/22  Creatinine 1.0, UA normal 5/22    Impression/Plan:     Large left renal mass- findings are highly suspicious for metastatic renal cell carcinoma, pulmonary biopsy in 2 days.  Will communicate with her oncologist to determine the next course of action, but most likely will need neoadjuvant therapy and if stable and appropriate, delayed left radical nephrectomy.  Will follow-up after biopsy and Oncology visit.

## 2022-06-05 ENCOUNTER — PATIENT MESSAGE (OUTPATIENT)
Dept: ENDOSCOPY | Facility: HOSPITAL | Age: 67
End: 2022-06-05
Payer: MEDICARE

## 2022-06-06 ENCOUNTER — ANESTHESIA (OUTPATIENT)
Dept: ENDOSCOPY | Facility: HOSPITAL | Age: 67
End: 2022-06-06
Payer: MEDICARE

## 2022-06-06 ENCOUNTER — ANESTHESIA EVENT (OUTPATIENT)
Dept: ENDOSCOPY | Facility: HOSPITAL | Age: 67
End: 2022-06-06
Payer: MEDICARE

## 2022-06-06 ENCOUNTER — HOSPITAL ENCOUNTER (OUTPATIENT)
Facility: HOSPITAL | Age: 67
Discharge: HOME | End: 2022-06-06
Attending: INTERNAL MEDICINE | Admitting: INTERNAL MEDICINE
Payer: MEDICARE

## 2022-06-06 VITALS
DIASTOLIC BLOOD PRESSURE: 68 MMHG | OXYGEN SATURATION: 96 % | WEIGHT: 180 LBS | HEIGHT: 66 IN | SYSTOLIC BLOOD PRESSURE: 119 MMHG | BODY MASS INDEX: 28.93 KG/M2 | HEART RATE: 70 BPM | RESPIRATION RATE: 20 BRPM | TEMPERATURE: 98 F

## 2022-06-06 DIAGNOSIS — R94.2 ABNORMAL PET SCAN, LUNG: ICD-10-CM

## 2022-06-06 DIAGNOSIS — R91.8 LUNG MASS: ICD-10-CM

## 2022-06-06 LAB
APPEARANCE FLD: CLEAR
BODY FLD TYPE: NORMAL
COLOR FLD: COLORLESS
LYMPHOCYTES NFR FLD MANUAL: 13 %
MONOS+MACROS NFR FLD MANUAL: 87 %
WBC # FLD: 8 /CU MM

## 2022-06-06 PROCEDURE — 31628 BRONCHOSCOPY/LUNG BX EACH: CPT | Mod: 59,RT,, | Performed by: INTERNAL MEDICINE

## 2022-06-06 PROCEDURE — 88173 CYTOPATH EVAL FNA REPORT: CPT | Mod: 26,,, | Performed by: STUDENT IN AN ORGANIZED HEALTH CARE EDUCATION/TRAINING PROGRAM

## 2022-06-06 PROCEDURE — 27200946 HC BRUSH, CYTOLOGY: Performed by: INTERNAL MEDICINE

## 2022-06-06 PROCEDURE — 88305 TISSUE EXAM BY PATHOLOGIST: CPT | Mod: 26,59,, | Performed by: PATHOLOGY

## 2022-06-06 PROCEDURE — 88112 CYTOPATH CELL ENHANCE TECH: CPT | Mod: 59 | Performed by: PATHOLOGY

## 2022-06-06 PROCEDURE — 25000242 PHARM REV CODE 250 ALT 637 W/ HCPCS: Performed by: NURSE ANESTHETIST, CERTIFIED REGISTERED

## 2022-06-06 PROCEDURE — 88112 CYTOPATH CELL ENHANCE TECH: CPT | Mod: 59 | Performed by: STUDENT IN AN ORGANIZED HEALTH CARE EDUCATION/TRAINING PROGRAM

## 2022-06-06 PROCEDURE — 31645 PR BRONCHOSCOPY,RX ASPIR PULM TREE: ICD-10-PCS | Mod: ,,, | Performed by: INTERNAL MEDICINE

## 2022-06-06 PROCEDURE — 31645 BRNCHSC W/THER ASPIR 1ST: CPT | Mod: ,,, | Performed by: INTERNAL MEDICINE

## 2022-06-06 PROCEDURE — 25000003 PHARM REV CODE 250: Performed by: NURSE ANESTHETIST, CERTIFIED REGISTERED

## 2022-06-06 PROCEDURE — 88112 PR  CYTOPATH, CELL ENHANCE TECH: ICD-10-PCS | Mod: 26,59,, | Performed by: STUDENT IN AN ORGANIZED HEALTH CARE EDUCATION/TRAINING PROGRAM

## 2022-06-06 PROCEDURE — 88305 TISSUE EXAM BY PATHOLOGIST: CPT | Mod: 26,,, | Performed by: STUDENT IN AN ORGANIZED HEALTH CARE EDUCATION/TRAINING PROGRAM

## 2022-06-06 PROCEDURE — 31652 BRONCH EBUS SAMPLNG 1/2 NODE: CPT | Mod: ,,, | Performed by: INTERNAL MEDICINE

## 2022-06-06 PROCEDURE — 31623 DX BRONCHOSCOPE/BRUSH: CPT | Mod: 59,RT | Performed by: INTERNAL MEDICINE

## 2022-06-06 PROCEDURE — 88305 TISSUE EXAM BY PATHOLOGIST: ICD-10-PCS | Mod: 26,,, | Performed by: STUDENT IN AN ORGANIZED HEALTH CARE EDUCATION/TRAINING PROGRAM

## 2022-06-06 PROCEDURE — 88172 CYTP DX EVAL FNA 1ST EA SITE: CPT | Performed by: STUDENT IN AN ORGANIZED HEALTH CARE EDUCATION/TRAINING PROGRAM

## 2022-06-06 PROCEDURE — 31628 BRONCHOSCOPY/LUNG BX EACH: CPT | Mod: 59,RT | Performed by: INTERNAL MEDICINE

## 2022-06-06 PROCEDURE — 88173 PR  INTERPRETATION OF FNA SMEAR: ICD-10-PCS | Mod: 26,,, | Performed by: STUDENT IN AN ORGANIZED HEALTH CARE EDUCATION/TRAINING PROGRAM

## 2022-06-06 PROCEDURE — 27202059 HC NEEDLE, FNA (ANY): Performed by: INTERNAL MEDICINE

## 2022-06-06 PROCEDURE — 37000009 HC ANESTHESIA EA ADD 15 MINS: Performed by: INTERNAL MEDICINE

## 2022-06-06 PROCEDURE — 88177 CYTP FNA EVAL EA ADDL: CPT | Performed by: STUDENT IN AN ORGANIZED HEALTH CARE EDUCATION/TRAINING PROGRAM

## 2022-06-06 PROCEDURE — 31624 PR BRONCHOSCOPY,DIAG2STIC W LAVAGE: ICD-10-PCS | Mod: 59,RT,, | Performed by: INTERNAL MEDICINE

## 2022-06-06 PROCEDURE — 88305 TISSUE EXAM BY PATHOLOGIST: CPT | Performed by: STUDENT IN AN ORGANIZED HEALTH CARE EDUCATION/TRAINING PROGRAM

## 2022-06-06 PROCEDURE — 88112 CYTOPATH CELL ENHANCE TECH: CPT | Mod: 26,59,, | Performed by: STUDENT IN AN ORGANIZED HEALTH CARE EDUCATION/TRAINING PROGRAM

## 2022-06-06 PROCEDURE — 31624 DX BRONCHOSCOPE/LAVAGE: CPT | Mod: 59,RT,, | Performed by: INTERNAL MEDICINE

## 2022-06-06 PROCEDURE — 63600175 PHARM REV CODE 636 W HCPCS: Performed by: NURSE ANESTHETIST, CERTIFIED REGISTERED

## 2022-06-06 PROCEDURE — 31652 PR BRONCH W/ EBUS, SAMPLING 1 OR 2 NODES, INCL GUIDE: ICD-10-PCS | Mod: ,,, | Performed by: INTERNAL MEDICINE

## 2022-06-06 PROCEDURE — 37000008 HC ANESTHESIA 1ST 15 MINUTES: Performed by: INTERNAL MEDICINE

## 2022-06-06 PROCEDURE — 25000003 PHARM REV CODE 250: Performed by: INTERNAL MEDICINE

## 2022-06-06 PROCEDURE — 31632 BRONCHOSCOPY/LUNG BX ADDL: CPT | Performed by: INTERNAL MEDICINE

## 2022-06-06 PROCEDURE — 88173 CYTOPATH EVAL FNA REPORT: CPT | Performed by: STUDENT IN AN ORGANIZED HEALTH CARE EDUCATION/TRAINING PROGRAM

## 2022-06-06 PROCEDURE — 88305 TISSUE EXAM BY PATHOLOGIST: CPT | Mod: 59 | Performed by: PATHOLOGY

## 2022-06-06 PROCEDURE — 31652 BRONCH EBUS SAMPLNG 1/2 NODE: CPT | Performed by: INTERNAL MEDICINE

## 2022-06-06 PROCEDURE — 31624 DX BRONCHOSCOPE/LAVAGE: CPT | Mod: 59,RT | Performed by: INTERNAL MEDICINE

## 2022-06-06 PROCEDURE — 31628 PR BRONCHOSCOPY,TRANSBRONCH BIOPSY: ICD-10-PCS | Mod: 59,RT,, | Performed by: INTERNAL MEDICINE

## 2022-06-06 PROCEDURE — 31623 PR BRONCHOSCOPY,DIAGNOSTIC W BRUSH: ICD-10-PCS | Mod: 59,RT,, | Performed by: INTERNAL MEDICINE

## 2022-06-06 PROCEDURE — 31632 BRONCHOSCOPY/LUNG BX ADDL: CPT | Mod: ,,, | Performed by: INTERNAL MEDICINE

## 2022-06-06 PROCEDURE — 88112 CYTOPATH CELL ENHANCE TECH: CPT | Mod: 26,59,, | Performed by: PATHOLOGY

## 2022-06-06 PROCEDURE — 31632 PR BRONCHOSCOPY/LUNG BX, ADD'L: ICD-10-PCS | Mod: ,,, | Performed by: INTERNAL MEDICINE

## 2022-06-06 PROCEDURE — 27200944 HC BRONCH FORCEPS DISPOSABLE: Performed by: INTERNAL MEDICINE

## 2022-06-06 PROCEDURE — 89051 BODY FLUID CELL COUNT: CPT | Performed by: INTERNAL MEDICINE

## 2022-06-06 PROCEDURE — 88112 PR  CYTOPATH, CELL ENHANCE TECH: ICD-10-PCS | Mod: 26,59,, | Performed by: PATHOLOGY

## 2022-06-06 PROCEDURE — 88305 TISSUE EXAM BY PATHOLOGIST: ICD-10-PCS | Mod: 26,59,, | Performed by: PATHOLOGY

## 2022-06-06 PROCEDURE — 31623 DX BRONCHOSCOPE/BRUSH: CPT | Mod: 59,RT,, | Performed by: INTERNAL MEDICINE

## 2022-06-06 RX ORDER — LIDOCAINE HYDROCHLORIDE 40 MG/ML
SOLUTION TOPICAL
Status: COMPLETED | OUTPATIENT
Start: 2022-06-06 | End: 2022-06-06

## 2022-06-06 RX ORDER — LIDOCAINE HYDROCHLORIDE 40 MG/ML
4 SOLUTION TOPICAL ONCE
Status: CANCELLED | OUTPATIENT
Start: 2022-06-06 | End: 2022-06-06

## 2022-06-06 RX ORDER — SODIUM CHLORIDE 9 MG/ML
INJECTION, SOLUTION INTRAVENOUS CONTINUOUS
Status: CANCELLED | OUTPATIENT
Start: 2022-06-06

## 2022-06-06 RX ORDER — LIDOCAINE HYDROCHLORIDE 10 MG/ML
20 INJECTION INFILTRATION; PERINEURAL ONCE
Status: DISCONTINUED | OUTPATIENT
Start: 2022-06-06 | End: 2022-06-06 | Stop reason: HOSPADM

## 2022-06-06 RX ORDER — PHENYLEPHRINE HYDROCHLORIDE 10 MG/ML
INJECTION INTRAVENOUS
Status: DISCONTINUED | OUTPATIENT
Start: 2022-06-06 | End: 2022-06-06

## 2022-06-06 RX ORDER — LIDOCAINE HYDROCHLORIDE 40 MG/ML
4 INJECTION, SOLUTION RETROBULBAR ONCE
Status: DISCONTINUED | OUTPATIENT
Start: 2022-06-06 | End: 2022-06-06 | Stop reason: HOSPADM

## 2022-06-06 RX ORDER — PROPOFOL 10 MG/ML
VIAL (ML) INTRAVENOUS
Status: DISCONTINUED | OUTPATIENT
Start: 2022-06-06 | End: 2022-06-06

## 2022-06-06 RX ORDER — SODIUM CHLORIDE, SODIUM LACTATE, POTASSIUM CHLORIDE, CALCIUM CHLORIDE 600; 310; 30; 20 MG/100ML; MG/100ML; MG/100ML; MG/100ML
INJECTION, SOLUTION INTRAVENOUS CONTINUOUS PRN
Status: DISCONTINUED | OUTPATIENT
Start: 2022-06-06 | End: 2022-06-06

## 2022-06-06 RX ORDER — LIDOCAINE HYDROCHLORIDE 10 MG/ML
INJECTION, SOLUTION EPIDURAL; INFILTRATION; INTRACAUDAL; PERINEURAL
Status: DISCONTINUED | OUTPATIENT
Start: 2022-06-06 | End: 2022-06-06

## 2022-06-06 RX ORDER — DEXAMETHASONE SODIUM PHOSPHATE 4 MG/ML
INJECTION, SOLUTION INTRA-ARTICULAR; INTRALESIONAL; INTRAMUSCULAR; INTRAVENOUS; SOFT TISSUE
Status: DISCONTINUED | OUTPATIENT
Start: 2022-06-06 | End: 2022-06-06

## 2022-06-06 RX ORDER — ONDANSETRON 2 MG/ML
INJECTION INTRAMUSCULAR; INTRAVENOUS
Status: DISCONTINUED | OUTPATIENT
Start: 2022-06-06 | End: 2022-06-06

## 2022-06-06 RX ORDER — SUCCINYLCHOLINE CHLORIDE 20 MG/ML
INJECTION INTRAMUSCULAR; INTRAVENOUS
Status: DISCONTINUED | OUTPATIENT
Start: 2022-06-06 | End: 2022-06-06

## 2022-06-06 RX ORDER — SODIUM CHLORIDE 9 MG/ML
INJECTION, SOLUTION INTRAVENOUS CONTINUOUS
Status: DISCONTINUED | OUTPATIENT
Start: 2022-06-06 | End: 2022-06-06 | Stop reason: HOSPADM

## 2022-06-06 RX ORDER — ALBUTEROL SULFATE 90 UG/1
AEROSOL, METERED RESPIRATORY (INHALATION)
Status: DISCONTINUED | OUTPATIENT
Start: 2022-06-06 | End: 2022-06-06

## 2022-06-06 RX ORDER — ROCURONIUM BROMIDE 10 MG/ML
INJECTION, SOLUTION INTRAVENOUS
Status: DISCONTINUED | OUTPATIENT
Start: 2022-06-06 | End: 2022-06-06

## 2022-06-06 RX ADMIN — LIDOCAINE HYDROCHLORIDE 100 MG: 10 INJECTION, SOLUTION EPIDURAL; INFILTRATION; INTRACAUDAL; PERINEURAL at 12:06

## 2022-06-06 RX ADMIN — PROPOFOL 150 MG: 10 INJECTION, EMULSION INTRAVENOUS at 12:06

## 2022-06-06 RX ADMIN — PROPOFOL 50 MG: 10 INJECTION, EMULSION INTRAVENOUS at 12:06

## 2022-06-06 RX ADMIN — DEXAMETHASONE SODIUM PHOSPHATE 8 MG: 4 INJECTION, SOLUTION INTRAMUSCULAR; INTRAVENOUS at 12:06

## 2022-06-06 RX ADMIN — PROPOFOL 30 MG: 10 INJECTION, EMULSION INTRAVENOUS at 01:06

## 2022-06-06 RX ADMIN — SUCCINYLCHOLINE CHLORIDE 160 MG: 20 INJECTION, SOLUTION INTRAMUSCULAR; INTRAVENOUS at 12:06

## 2022-06-06 RX ADMIN — SODIUM CHLORIDE, SODIUM LACTATE, POTASSIUM CHLORIDE, AND CALCIUM CHLORIDE: .6; .31; .03; .02 INJECTION, SOLUTION INTRAVENOUS at 12:06

## 2022-06-06 RX ADMIN — ONDANSETRON 4 MG: 2 INJECTION, SOLUTION INTRAMUSCULAR; INTRAVENOUS at 12:06

## 2022-06-06 RX ADMIN — ALBUTEROL SULFATE 4 PUFF: 90 AEROSOL, METERED RESPIRATORY (INHALATION) at 01:06

## 2022-06-06 RX ADMIN — PHENYLEPHRINE HYDROCHLORIDE 100 MCG: 10 INJECTION INTRAVENOUS at 12:06

## 2022-06-06 RX ADMIN — LIDOCAINE HYDROCHLORIDE 8 ML: 40 SOLUTION TOPICAL at 12:06

## 2022-06-06 RX ADMIN — ROCURONIUM BROMIDE 5 MG: 10 INJECTION, SOLUTION INTRAVENOUS at 12:06

## 2022-06-06 NOTE — OP NOTE
BRONCHSCOPY PERFORMED  NORMAL AIRWAY  RML: BAL, TBBX,  RLL: Cytology Brush and TBBX  RUL: TBBX    EBUS  Station 7: 29.4 mm( 4 passes's)  Station 10 R: 13.9mm( 2 passes's)    Post procedure CXR: No pneumothorax    Patient extubated to nasal canular O2

## 2022-06-06 NOTE — ANESTHESIA PROCEDURE NOTES
Intubation    Date/Time: 6/6/2022 12:36 PM  Performed by: Dolores Joshi CRNA  Authorized by: Harsha Valera II, MD     Intubation:     Induction:  Intravenous    Intubated:  Postinduction    Mask Ventilation:  Easy mask    Attempts:  1    Attempted By:  CRNA    Method of Intubation:  Direct    Blade:  Gil 2    Laryngeal View Grade: Grade I - full view of cords      Difficult Airway Encountered?: No      Complications:  None    Airway Device:  Oral endotracheal tube    Airway Device Size:  8.5    Style/Cuff Inflation:  Cuffed (inflated to minimal occlusive pressure)    Tube secured:  21    Secured at:  The lips    Placement Verified By:  Capnometry    Complicating Factors:  None    Findings Post-Intubation:  BS equal bilateral and atraumatic/condition of teeth unchanged (lips/mucosa intact)

## 2022-06-06 NOTE — ANESTHESIA PREPROCEDURE EVALUATION
06/06/2022  Loyda Montgomery is a 66 y.o., female.      Pre-op Assessment    I have reviewed the Patient Summary Reports.     I have reviewed the Nursing Notes. I have reviewed the NPO Status.   I have reviewed the Medications.     Review of Systems  Anesthesia Hx:  Denies Family Hx of Anesthesia complications.   Denies Personal Hx of Anesthesia complications.   Social:  Non-Smoker    Cardiovascular:   Hypertension hyperlipidemia    Pulmonary:   innumerable markedly hypermetabolic nodules scattered throughout both lungs highly suspicious for metastatic disease. See CT Pulmonary Symptoms:  are dry cough.    Renal/:  Neoplasm/Tumor, Renal Neoplasm, Suspected Renal Cell CA.    Neurological:   CVA Headaches        Physical Exam  General: Well nourished    Airway:  Mallampati: II   Mouth Opening: Normal  Tongue: Normal  Neck ROM: Normal ROM    Dental:  Intact        Anesthesia Plan  Type of Anesthesia, risks & benefits discussed:    Anesthesia Type: Gen ETT  Intra-op Monitoring Plan: Standard ASA Monitors  Post Op Pain Control Plan: IV/PO Opioids PRN  Induction:  IV  Airway Plan: Direct  Informed Consent: Informed consent signed with the Patient and all parties understand the risks and agree with anesthesia plan.  All questions answered.   ASA Score: 3  Day of Surgery Review of History & Physical: H&P Update referred to the surgeon/provider.I have interviewed and examined the patient. I have reviewed the patient's H&P dated:     Ready For Surgery From Anesthesia Perspective.     .

## 2022-06-06 NOTE — ANESTHESIA POSTPROCEDURE EVALUATION
Anesthesia Post Evaluation    Patient: Loyda Montgomery    Procedure(s) Performed: Procedure(s) (LRB):  Bronchoscopy - insert lighted tube into airway to take a biopsy of lung (Bilateral)  ENDOBRONCHIAL ULTRASOUND (EBUS) (Bilateral)    Final Anesthesia Type: general      Patient location during evaluation: GI PACU  Patient participation: Yes- Able to Participate  Level of consciousness: awake and alert  Post-procedure vital signs: reviewed and stable  Pain management: adequate  Airway patency: patent    PONV status at discharge: No PONV  Anesthetic complications: no      Cardiovascular status: blood pressure returned to baseline and hemodynamically stable  Respiratory status: unassisted, spontaneous ventilation and room air  Hydration status: euvolemic  Follow-up not needed.          Vitals Value Taken Time   /68 06/06/22 1356   Temp 36.6 °C (97.8 °F) 06/06/22 1336   Pulse 70 06/06/22 1356   Resp 20 06/06/22 1358   SpO2 96 % 06/06/22 1446         Event Time   Out of Recovery 14:57:44         Pain/Alvina Score: Alvina Score: 9 (6/6/2022  1:58 PM)

## 2022-06-06 NOTE — TRANSFER OF CARE
"Anesthesia Transfer of Care Note    Patient: Loyda Montgomery    Procedure(s) Performed: Procedure(s) (LRB):  Bronchoscopy - insert lighted tube into airway to take a biopsy of lung (Bilateral)  ENDOBRONCHIAL ULTRASOUND (EBUS) (Bilateral)    Patient location: GI    Anesthesia Type: general    Transport from OR: Transported from OR on room air with adequate spontaneous ventilation    Post pain: adequate analgesia    Post assessment: no apparent anesthetic complications    Post vital signs: stable    Level of consciousness: awake    Nausea/Vomiting: no nausea/vomiting    Complications: none    Transfer of care protocol was followed      Last vitals:   Visit Vitals  BP (!) 123/49 (BP Location: Left arm, Patient Position: Lying)   Pulse 68   Temp 36.6 °C (97.8 °F) (Temporal)   Resp (!) 22   Ht 5' 6" (1.676 m)   Wt 81.6 kg (180 lb)   LMP  (LMP Unknown)   SpO2 (!) 94%   Breastfeeding No   BMI 29.05 kg/m²     "

## 2022-06-07 LAB — PATH INTERP FLD-IMP: NORMAL

## 2022-06-08 ENCOUNTER — TELEPHONE (OUTPATIENT)
Dept: HEMATOLOGY/ONCOLOGY | Facility: CLINIC | Age: 67
End: 2022-06-08
Payer: MEDICARE

## 2022-06-08 LAB
FINAL PATHOLOGIC DIAGNOSIS: NORMAL
Lab: NORMAL

## 2022-06-08 NOTE — TELEPHONE ENCOUNTER
----- Message from Ghassan Lee MD sent at 6/8/2022  6:52 AM CDT -----  Pathology is still in process. Reschedule to next week

## 2022-06-09 LAB
FINAL PATHOLOGIC DIAGNOSIS: ABNORMAL
FINAL PATHOLOGIC DIAGNOSIS: NORMAL
Lab: ABNORMAL
Lab: NORMAL

## 2022-06-10 ENCOUNTER — TELEPHONE (OUTPATIENT)
Dept: HEMATOLOGY/ONCOLOGY | Facility: CLINIC | Age: 67
End: 2022-06-10
Payer: MEDICARE

## 2022-06-10 NOTE — TELEPHONE ENCOUNTER
----- Message from Criss Lagos sent at 6/10/2022  9:56 AM CDT -----  Regarding: Call back  Contact: [phone]  Type:  Patient Call Back    Who Called:pt  What this is regarding?:returning call to Enid Guzman MA  Would the patient rather a call back or a response via [employer]? Call back  Best Call Back Number:[phone]   Additional Information:     Advised to call back directly if there are further questions, or if these symptoms fail to improve as anticipated or worsen.

## 2022-06-14 LAB
COMMENT: NORMAL
FINAL PATHOLOGIC DIAGNOSIS: NORMAL
GROSS: NORMAL
Lab: NORMAL
MICROSCOPIC EXAM: NORMAL

## 2022-06-15 ENCOUNTER — LAB VISIT (OUTPATIENT)
Dept: LAB | Facility: HOSPITAL | Age: 67
End: 2022-06-15
Attending: INTERNAL MEDICINE
Payer: MEDICARE

## 2022-06-15 ENCOUNTER — TELEPHONE (OUTPATIENT)
Dept: PULMONOLOGY | Facility: CLINIC | Age: 67
End: 2022-06-15
Payer: MEDICARE

## 2022-06-15 ENCOUNTER — TELEPHONE (OUTPATIENT)
Dept: SLEEP MEDICINE | Facility: CLINIC | Age: 67
End: 2022-06-15
Payer: MEDICARE

## 2022-06-15 ENCOUNTER — OFFICE VISIT (OUTPATIENT)
Dept: HEMATOLOGY/ONCOLOGY | Facility: CLINIC | Age: 67
End: 2022-06-15
Payer: MEDICARE

## 2022-06-15 VITALS
BODY MASS INDEX: 29.09 KG/M2 | SYSTOLIC BLOOD PRESSURE: 142 MMHG | HEART RATE: 67 BPM | WEIGHT: 181 LBS | RESPIRATION RATE: 20 BRPM | HEIGHT: 66 IN | DIASTOLIC BLOOD PRESSURE: 58 MMHG | TEMPERATURE: 98 F | OXYGEN SATURATION: 98 %

## 2022-06-15 DIAGNOSIS — R94.2 ABNORMAL PET SCAN, LUNG: ICD-10-CM

## 2022-06-15 DIAGNOSIS — C64.2 MALIGNANT NEOPLASM OF LEFT KIDNEY: ICD-10-CM

## 2022-06-15 LAB
ALBUMIN SERPL BCP-MCNC: 3.4 G/DL (ref 3.5–5.2)
ALP SERPL-CCNC: 118 U/L (ref 55–135)
ALT SERPL W/O P-5'-P-CCNC: 13 U/L (ref 10–44)
ANION GAP SERPL CALC-SCNC: 13 MMOL/L (ref 8–16)
AST SERPL-CCNC: 30 U/L (ref 10–40)
BASOPHILS # BLD AUTO: 0.1 K/UL (ref 0–0.2)
BASOPHILS NFR BLD: 0.9 % (ref 0–1.9)
BILIRUB SERPL-MCNC: 0.8 MG/DL (ref 0.1–1)
BUN SERPL-MCNC: 21 MG/DL (ref 8–23)
CALCIUM SERPL-MCNC: 11.2 MG/DL (ref 8.7–10.5)
CHLORIDE SERPL-SCNC: 102 MMOL/L (ref 95–110)
CO2 SERPL-SCNC: 26 MMOL/L (ref 23–29)
CREAT SERPL-MCNC: 1 MG/DL (ref 0.5–1.4)
DIFFERENTIAL METHOD: ABNORMAL
EOSINOPHIL # BLD AUTO: 0.3 K/UL (ref 0–0.5)
EOSINOPHIL NFR BLD: 3.1 % (ref 0–8)
ERYTHROCYTE [DISTWIDTH] IN BLOOD BY AUTOMATED COUNT: 19.3 % (ref 11.5–14.5)
EST. GFR  (AFRICAN AMERICAN): >60 ML/MIN/1.73 M^2
EST. GFR  (NON AFRICAN AMERICAN): 58 ML/MIN/1.73 M^2
GLUCOSE SERPL-MCNC: 124 MG/DL (ref 70–110)
HCT VFR BLD AUTO: 44.3 % (ref 37–48.5)
HGB BLD-MCNC: 13.3 G/DL (ref 12–16)
IMM GRANULOCYTES # BLD AUTO: 0.07 K/UL (ref 0–0.04)
IMM GRANULOCYTES NFR BLD AUTO: 0.6 % (ref 0–0.5)
LYMPHOCYTES # BLD AUTO: 1.6 K/UL (ref 1–4.8)
LYMPHOCYTES NFR BLD: 14.4 % (ref 18–48)
MCH RBC QN AUTO: 25.6 PG (ref 27–31)
MCHC RBC AUTO-ENTMCNC: 30 G/DL (ref 32–36)
MCV RBC AUTO: 85 FL (ref 82–98)
MONOCYTES # BLD AUTO: 0.7 K/UL (ref 0.3–1)
MONOCYTES NFR BLD: 6.6 % (ref 4–15)
NEUTROPHILS # BLD AUTO: 8.3 K/UL (ref 1.8–7.7)
NEUTROPHILS NFR BLD: 74.4 % (ref 38–73)
NRBC BLD-RTO: 0 /100 WBC
PLATELET # BLD AUTO: 312 K/UL (ref 150–450)
PMV BLD AUTO: 10 FL (ref 9.2–12.9)
POTASSIUM SERPL-SCNC: 4.4 MMOL/L (ref 3.5–5.1)
PROT SERPL-MCNC: 7 G/DL (ref 6–8.4)
RBC # BLD AUTO: 5.2 M/UL (ref 4–5.4)
SODIUM SERPL-SCNC: 141 MMOL/L (ref 136–145)
WBC # BLD AUTO: 11.11 K/UL (ref 3.9–12.7)

## 2022-06-15 PROCEDURE — 3288F PR FALLS RISK ASSESSMENT DOCUMENTED: ICD-10-PCS | Mod: CPTII,S$GLB,, | Performed by: INTERNAL MEDICINE

## 2022-06-15 PROCEDURE — 3008F PR BODY MASS INDEX (BMI) DOCUMENTED: ICD-10-PCS | Mod: CPTII,S$GLB,, | Performed by: INTERNAL MEDICINE

## 2022-06-15 PROCEDURE — 3078F DIAST BP <80 MM HG: CPT | Mod: CPTII,S$GLB,, | Performed by: INTERNAL MEDICINE

## 2022-06-15 PROCEDURE — 3077F SYST BP >= 140 MM HG: CPT | Mod: CPTII,S$GLB,, | Performed by: INTERNAL MEDICINE

## 2022-06-15 PROCEDURE — 1101F PT FALLS ASSESS-DOCD LE1/YR: CPT | Mod: CPTII,S$GLB,, | Performed by: INTERNAL MEDICINE

## 2022-06-15 PROCEDURE — 1159F PR MEDICATION LIST DOCUMENTED IN MEDICAL RECORD: ICD-10-PCS | Mod: CPTII,S$GLB,, | Performed by: INTERNAL MEDICINE

## 2022-06-15 PROCEDURE — 1101F PR PT FALLS ASSESS DOC 0-1 FALLS W/OUT INJ PAST YR: ICD-10-PCS | Mod: CPTII,S$GLB,, | Performed by: INTERNAL MEDICINE

## 2022-06-15 PROCEDURE — 3288F FALL RISK ASSESSMENT DOCD: CPT | Mod: CPTII,S$GLB,, | Performed by: INTERNAL MEDICINE

## 2022-06-15 PROCEDURE — 99214 PR OFFICE/OUTPT VISIT, EST, LEVL IV, 30-39 MIN: ICD-10-PCS | Mod: S$GLB,,, | Performed by: INTERNAL MEDICINE

## 2022-06-15 PROCEDURE — 80053 COMPREHEN METABOLIC PANEL: CPT | Performed by: INTERNAL MEDICINE

## 2022-06-15 PROCEDURE — 99999 PR PBB SHADOW E&M-EST. PATIENT-LVL IV: ICD-10-PCS | Mod: PBBFAC,,, | Performed by: INTERNAL MEDICINE

## 2022-06-15 PROCEDURE — 3078F PR MOST RECENT DIASTOLIC BLOOD PRESSURE < 80 MM HG: ICD-10-PCS | Mod: CPTII,S$GLB,, | Performed by: INTERNAL MEDICINE

## 2022-06-15 PROCEDURE — 3008F BODY MASS INDEX DOCD: CPT | Mod: CPTII,S$GLB,, | Performed by: INTERNAL MEDICINE

## 2022-06-15 PROCEDURE — 1125F AMNT PAIN NOTED PAIN PRSNT: CPT | Mod: CPTII,S$GLB,, | Performed by: INTERNAL MEDICINE

## 2022-06-15 PROCEDURE — 3044F HG A1C LEVEL LT 7.0%: CPT | Mod: CPTII,S$GLB,, | Performed by: INTERNAL MEDICINE

## 2022-06-15 PROCEDURE — 99999 PR PBB SHADOW E&M-EST. PATIENT-LVL IV: CPT | Mod: PBBFAC,,, | Performed by: INTERNAL MEDICINE

## 2022-06-15 PROCEDURE — 99214 OFFICE O/P EST MOD 30 MIN: CPT | Mod: S$GLB,,, | Performed by: INTERNAL MEDICINE

## 2022-06-15 PROCEDURE — 1159F MED LIST DOCD IN RCRD: CPT | Mod: CPTII,S$GLB,, | Performed by: INTERNAL MEDICINE

## 2022-06-15 PROCEDURE — 3044F PR MOST RECENT HEMOGLOBIN A1C LEVEL <7.0%: ICD-10-PCS | Mod: CPTII,S$GLB,, | Performed by: INTERNAL MEDICINE

## 2022-06-15 PROCEDURE — 85025 COMPLETE CBC W/AUTO DIFF WBC: CPT | Performed by: INTERNAL MEDICINE

## 2022-06-15 PROCEDURE — 1125F PR PAIN SEVERITY QUANTIFIED, PAIN PRESENT: ICD-10-PCS | Mod: CPTII,S$GLB,, | Performed by: INTERNAL MEDICINE

## 2022-06-15 PROCEDURE — 3077F PR MOST RECENT SYSTOLIC BLOOD PRESSURE >= 140 MM HG: ICD-10-PCS | Mod: CPTII,S$GLB,, | Performed by: INTERNAL MEDICINE

## 2022-06-15 PROCEDURE — 36415 COLL VENOUS BLD VENIPUNCTURE: CPT | Performed by: INTERNAL MEDICINE

## 2022-06-15 NOTE — ASSESSMENT & PLAN NOTE
Probable metastatic renal cell carcinoma vs lymphoma.  Reviewed PET scan with patient and answered her questions.  MRI of the brain revealed an area of enhancement in the left frontal bone extending into the soft tissue concerning for metastatic focus.    Interestingly, transbronchial biopsy of right lower lobe, right middle lobe and right upper lobe were all negative for malignancy, rather it showed mild acute/chronic inflammation and reactive changes.     Will order for CT guided lung biopsy. IR notified.     Will plan to discuss this case at tumor conference given the complexity.      Patient is to return in 2-3 weeks with repeat labs or sooner if needed.

## 2022-06-15 NOTE — PROGRESS NOTES
Subjective:   Date of Visit: 6/15/22   ?   ?    REFERRING PROVIDER: No referring provider defined for this encounter.   ?   CHIEF COMPLAINT: Probable metastatic renal cell carcinoma???????   ?   ONCOLOGIC DIAGNOSIS:  Probable stage for renal cell carcinoma  ?   CURRENT TREATMENT:  None    PAST TREATMENT:  None  ?   ONCOLOGIC HISTORY:     PET scan:  05/17/2022  Chest:     There are innumerable markedly hypermetabolic nodules scattered throughout both lungs highly suspicious for metastatic disease.  A large coalescent mass of nodules in the superior segment of the right lower lobe on image 72 measures 5.0 x 4.4 cm with a max SUV of 12.4.  One of the larger nodules in the left lung in the anterior aspect of the left upper lobe on image 59 measures 2.7 x 2.7 cm with a max SUV of 8.6.  There is extensive hypermetabolic lymphadenopathy with hypermetabolic enlarged nodes in the right paratracheal region, precarinal region, subcarinal region, and bilateral hilar regions.  A precarinal node on image 70 measures 4.8 x 3.9 cm with a max SUV of 7.4.  A subcarinal node on image 82 measures 7.8 x 3.7 cm with a max SUV of 8.1.  No pleural or pericardial effusion are present.     Abdomen/pelvis:     There is a large hypermetabolic mass arising from the lower pole of the left kidney highly suspicious for renal cell carcinoma.  There is central necrosis within this mass.  The mass appears to involve at least a portion of the left renal hilum but does not appear to extend into the left renal vein.  The mass measures 7.5 x 6.8 x 6.0 cm and has a max SUV of 13.0.  There is an adjacent hypermetabolic periaortic retroperitoneal lymph node consistent with neoplastic adenopathy.  On image 139 the node measures 3.4 x 2.8 cm with a max SUV of 6.6.  The adrenal glands are normal.  No ascites is present.  There is a small fat containing umbilical hernia.     Skeleton:     There is diffuse nonfocal hypermetabolism of the bone marrow  predominantly in the axial skeleton of uncertain significance.  This pattern can be seen with various blood dyscrasias and in smokers, but the possibility of diffuse osseous metastatic disease cannot be excluded.     Impression:     1. Large hypermetabolic lower pole left renal mass highly suspicious for renal cell carcinoma.  There is an adjacent enlarged hypermetabolic retroperitoneal lymph node highly suspicious for neoplastic adenopathy.  2. Innumerable markedly hypermetabolic bilateral pulmonary nodules consistent with metastatic disease.  There is also extensive hypermetabolic mediastinal and bilateral pulmonary hilar lymph lymphadenopathy consistent with metastatic disease.  3. Diffuse nonfocal hypermetabolism predominantly within the axial skeleton of uncertain significance.  This pattern can be seen with various blood dyscrasias and in smokers, but the possibility of diffuse osseous metastatic disease cannot be excluded.       HPI:   66-year-old female with history of hyperlipidemia, hypertension, was referred to us by Dr. Rosario due to recently noted abnormal PET scan.  Interestingly patient has recently complained of unintentional weight loss and persistent cough.  Chest x-ray showed multiple pulmonary nodules suspicious for metastasis.  This necessitated PET-CT scan that revealed findings stated above.    Today she continues to complain weight loss of more than 20 lb over the past 6 months, shortness of breath, persistent nonproductive cough. Also had 2 episodes of hematuria. She denies fever, chills or flank pain.    Denies smoking or alcohol use.  No pertinent family history.    Review of Systems   Constitutional: Positive for fatigue. Negative for activity change, appetite change, chills, fever and unexpected weight change.   HENT: Negative for hearing loss, mouth sores, nosebleeds, sore throat, tinnitus, trouble swallowing and voice change.    Eyes: Negative for visual disturbance.   Respiratory:  Negative for cough, chest tightness and shortness of breath.    Cardiovascular: Negative for chest pain, palpitations and leg swelling.   Gastrointestinal: Negative for abdominal pain, anal bleeding, blood in stool, constipation, diarrhea, nausea and vomiting.   Genitourinary: Negative for dysuria, frequency, hematuria, pelvic pain, vaginal bleeding and vaginal pain.   Musculoskeletal: Positive for back pain. Negative for arthralgias, joint swelling and neck pain.   Skin: Negative for color change, pallor, rash and wound.   Allergic/Immunologic: Negative for immunocompromised state.   Neurological: Positive for weakness. Negative for dizziness, tremors, syncope, speech difficulty, light-headedness and headaches.   Hematological: Negative for adenopathy. Does not bruise/bleed easily.   Psychiatric/Behavioral: Negative for agitation, confusion, decreased concentration, hallucinations and sleep disturbance. The patient is not nervous/anxious.        ?   PAST MEDICAL HISTORY:   Past Medical History:   Diagnosis Date    Hyperlipidemia     Hypertension     Stroke     ?     PAST SURGICAL HISTORY:   Past Surgical History:   Procedure Laterality Date    BRONCHOSCOPY Bilateral 6/6/2022    Procedure: Bronchoscopy - insert lighted tube into airway to take a biopsy of lung;  Surgeon: Michi Ceron MD;  Location: [facility];  Service: Endoscopy;  Laterality: Bilateral;    ENDOBRONCHIAL ULTRASOUND Bilateral 6/6/2022    Procedure: ENDOBRONCHIAL ULTRASOUND (EBUS);  Surgeon: Michi Ceron MD;  Location: [facility];  Service: Endoscopy;  Laterality: Bilateral;    TONSILLECTOMY        ?   ALLERGIES:   Allergies as of 06/15/2022    (No Known Allergies)      ?   MEDICATIONS:?   Outpatient Medications Marked as Taking for the 6/15/22 encounter (Office Visit) with Ghassan Lee MD   Medication Sig Dispense Refill    amLODIPine (NORVASC) 10 MG tablet Take 1 tablet (10 mg total) by mouth once daily. 90 tablet 1     atorvastatin (LIPITOR) 80 MG tablet Take 1 tablet (80 mg total) by mouth once daily. 90 tablet 1    carvediloL (COREG) 6.25 MG tablet Take 1 tablet (6.25 mg total) by mouth 2 (two) times daily with meals. 180 tablet 1    cetirizine (ZYRTEC) 10 MG tablet Take 1 tablet (10 mg total) by mouth once daily. 30 tablet 11    clopidogreL (PLAVIX) 75 mg tablet Take 1 tablet (75 mg total) by mouth once daily. 90 tablet 1    fluticasone propionate (FLONASE) 50 mcg/actuation nasal spray Use 2 sprays to each nostril daily 16 g 0    hydroCHLOROthiazide (HYDRODIURIL) 25 MG tablet Take 1 tablet (25 mg total) by mouth once daily. 90 tablet 1    potassium chloride SA (K-DUR,KLOR-CON) 20 MEQ tablet Take 1 tablet (20 mEq total) by mouth once daily. 90 tablet 1      ?   SOCIAL HISTORY:?   Social History     Tobacco Use    Smoking status: Never Smoker    Smokeless tobacco: Never Used   Substance Use Topics    Alcohol use: No     Alcohol/week: 0.0 standard drinks        ?   FAMILY HISTORY:   family history includes Stroke in her father, mother, and sister.   ?     Objective:      Physical Exam  Constitutional:       General: She is not in acute distress.     Appearance: She is well-developed. She is obese. She is not ill-appearing or toxic-appearing.   HENT:      Head: Normocephalic and atraumatic.      Mouth/Throat:      Pharynx: No oropharyngeal exudate.   Eyes:      General: No scleral icterus.        Right eye: No discharge.         Left eye: No discharge.      Conjunctiva/sclera: Conjunctivae normal.      Pupils: Pupils are equal, round, and reactive to light.   Neck:      Thyroid: No thyromegaly.   Cardiovascular:      Rate and Rhythm: Normal rate and regular rhythm.      Heart sounds: No murmur heard.  Pulmonary:      Effort: Pulmonary effort is normal. No respiratory distress.      Breath sounds: Normal breath sounds.   Chest:      Chest wall: No tenderness.   Breasts:      Right: No supraclavicular adenopathy.      Left:  No supraclavicular adenopathy.       Abdominal:      General: Bowel sounds are normal. There is no distension.      Palpations: Abdomen is soft. There is no mass.      Tenderness: There is no abdominal tenderness. There is no guarding or rebound.   Musculoskeletal:         General: No tenderness. Normal range of motion.      Cervical back: Normal range of motion and neck supple.   Lymphadenopathy:      Cervical: No cervical adenopathy.      Right cervical: No superficial cervical adenopathy.     Left cervical: No superficial cervical adenopathy.      Upper Body:      Right upper body: No supraclavicular or pectoral adenopathy.      Left upper body: No supraclavicular or pectoral adenopathy.   Skin:     General: Skin is warm and dry.      Capillary Refill: Capillary refill takes 2 to 3 seconds.      Coloration: Skin is not pale.      Findings: No erythema or rash.   Neurological:      Mental Status: She is alert and oriented to person, place, and time.      Cranial Nerves: No cranial nerve deficit.      Sensory: No sensory deficit.   Psychiatric:         Behavior: Behavior normal. Behavior is cooperative.         Judgment: Judgment normal.         ?   Vitals:    06/15/22 1257   BP: (!) 142/58   Pulse: 67   Resp: 20   Temp: 97.7 °F (36.5 °C)      ?     ECOG SCORE    2 - Capable of all selfcare but unable to carry out any work activities, active > 50% of hours         ?   Laboratory:  ?   Lab Visit on 06/15/2022   Component Date Value Ref Range Status    WBC 06/15/2022 11.11  3.90 - 12.70 K/uL Final    RBC 06/15/2022 5.20  4.00 - 5.40 M/uL Final    Hemoglobin 06/15/2022 13.3  12.0 - 16.0 g/dL Final    Hematocrit 06/15/2022 44.3  37.0 - 48.5 % Final    MCV 06/15/2022 85  82 - 98 fL Final    MCH 06/15/2022 25.6 (A) 27.0 - 31.0 pg Final    MCHC 06/15/2022 30.0 (A) 32.0 - 36.0 g/dL Final    RDW 06/15/2022 19.3 (A) 11.5 - 14.5 % Final    Platelets 06/15/2022 312  150 - 450 K/uL Final    MPV 06/15/2022 10.0  9.2 -  12.9 fL Final    Immature Granulocytes 06/15/2022 0.6 (A) 0.0 - 0.5 % Final    Gran # (ANC) 06/15/2022 8.3 (A) 1.8 - 7.7 K/uL Final    Immature Grans (Abs) 06/15/2022 0.07 (A) 0.00 - 0.04 K/uL Final    Lymph # 06/15/2022 1.6  1.0 - 4.8 K/uL Final    Mono # 06/15/2022 0.7  0.3 - 1.0 K/uL Final    Eos # 06/15/2022 0.3  0.0 - 0.5 K/uL Final    Baso # 06/15/2022 0.10  0.00 - 0.20 K/uL Final    nRBC 06/15/2022 0  0 /100 WBC Final    Gran % 06/15/2022 74.4 (A) 38.0 - 73.0 % Final    Lymph % 06/15/2022 14.4 (A) 18.0 - 48.0 % Final    Mono % 06/15/2022 6.6  4.0 - 15.0 % Final    Eosinophil % 06/15/2022 3.1  0.0 - 8.0 % Final    Basophil % 06/15/2022 0.9  0.0 - 1.9 % Final    Differential Method 06/15/2022 Automated   Final    Sodium 06/15/2022 141  136 - 145 mmol/L Final    Potassium 06/15/2022 4.4  3.5 - 5.1 mmol/L Final    Chloride 06/15/2022 102  95 - 110 mmol/L Final    CO2 06/15/2022 26  23 - 29 mmol/L Final    Glucose 06/15/2022 124 (A) 70 - 110 mg/dL Final    BUN 06/15/2022 21  8 - 23 mg/dL Final    Creatinine 06/15/2022 1.0  0.5 - 1.4 mg/dL Final    Calcium 06/15/2022 11.2 (A) 8.7 - 10.5 mg/dL Final    Total Protein 06/15/2022 7.0  6.0 - 8.4 g/dL Final    Albumin 06/15/2022 3.4 (A) 3.5 - 5.2 g/dL Final    Total Bilirubin 06/15/2022 0.8  0.1 - 1.0 mg/dL Final    Alkaline Phosphatase 06/15/2022 118  55 - 135 U/L Final    AST 06/15/2022 30  10 - 40 U/L Final    ALT 06/15/2022 13  10 - 44 U/L Final    Anion Gap 06/15/2022 13  8 - 16 mmol/L Final    eGFR if African American 06/15/2022 >60  >60 mL/min/1.73 m^2 Final    eGFR if non  06/15/2022 58 (A) >60 mL/min/1.73 m^2 Final      ?   Tumor markers   ?   ?   Imaging: X-Ray Chest AP Portable  Narrative: EXAMINATION:  XR CHEST AP PORTABLE    CLINICAL HISTORY:  pneumothorax;.    TECHNIQUE:  Single frontal portable view of the chest was performed.    COMPARISON:  05/12/2022    FINDINGS:  Support devices: None    Compared to  05/12/2022, interval increase in peripheral opacification in the right lung possibly related to worsening metastatic disease versus acute infiltrate.  No pneumothorax identified.  Bilateral pulmonary nodules otherwise unchanged.    Heart normal size.  No pleural effusion.    Bones are intact.  Impression: Compared to 05/12/2022, interval increase in peripheral opacification in the right lung possibly related to worsening metastatic disease versus acute infiltrate. No pneumothorax identified. Bilateral pulmonary nodules otherwise unchanged.    Electronically signed by: Walter Hammond  Date:    06/06/2022  Time:    15:18     ?      Pathology:  Pathology Results  (Last 10 years)               06/06/22 1423  Specimen to Pathology, Surgery Pulmonary and Thoracic Final result    Narrative:  Pre-op Diagnosis: Abnormal PET scan, lung [R94.2]   Malignant neoplasm of left kidney [C64.2]   Procedure(s):   Bronchoscopy - insert lighted tube into airway to take a   biopsy of lung   ENDOBRONCHIAL ULTRASOUND (EBUS)   Number of specimens: 3   Name of specimens:   1-RLL Bx   2-RML Bx   3-RUL bx   Which provider would you like to cc?->JAZMYN TORRES   Release to patient->Immediate   Specimen total (fresh, frozen, permanent):->3              ?   Assessment/Plan:       1. Abnormal PET scan, lung    2. Malignant neoplasm of left kidney          Abnormal PET scan, lung  Probable metastatic renal cell carcinoma vs lymphoma.  Reviewed PET scan with patient and answered her questions.  MRI of the brain revealed an area of enhancement in the left frontal bone extending into the soft tissue concerning for metastatic focus.    Interestingly, transbronchial biopsy of right lower lobe, right middle lobe and right upper lobe were all negative for malignancy, rather it showed mild acute/chronic inflammation and reactive changes.     Will order for CT guided lung biopsy. IR notified.     Will plan to discuss this case at tumor conference given the  complexity.      Patient is to return in 2-3 weeks with repeat labs or sooner if needed.    Malignant neoplasm of left kidney  Suspicious for primary renal disease.  Referred to urologist.      ?Abnormal PET scan, lung  -     CBC Auto Differential; Future; Expected date: 06/15/2022  -     Comprehensive Metabolic Panel; Future; Expected date: 06/15/2022  -     IR Biopsy Lung w/ guidance; Future; Expected date: 06/15/2022    Malignant neoplasm of left kidney  -     CBC Auto Differential; Future; Expected date: 06/15/2022  -     Comprehensive Metabolic Panel; Future; Expected date: 06/15/2022      Follow-Up: Follow up in about 3 weeks (around 7/6/2022).    KAITLIN BOGGS Md., Ph.D  Hematology & Oncology Department  Phone #: [phone]

## 2022-06-15 NOTE — H&P (VIEW-ONLY)
Subjective:   Date of Visit: 6/15/22   ?   ?    REFERRING PROVIDER: No referring provider defined for this encounter.   ?   CHIEF COMPLAINT: Probable metastatic renal cell carcinoma???????   ?   ONCOLOGIC DIAGNOSIS:  Probable stage for renal cell carcinoma  ?   CURRENT TREATMENT:  None    PAST TREATMENT:  None  ?   ONCOLOGIC HISTORY:     PET scan:  05/17/2022  Chest:     There are innumerable markedly hypermetabolic nodules scattered throughout both lungs highly suspicious for metastatic disease.  A large coalescent mass of nodules in the superior segment of the right lower lobe on image 72 measures 5.0 x 4.4 cm with a max SUV of 12.4.  One of the larger nodules in the left lung in the anterior aspect of the left upper lobe on image 59 measures 2.7 x 2.7 cm with a max SUV of 8.6.  There is extensive hypermetabolic lymphadenopathy with hypermetabolic enlarged nodes in the right paratracheal region, precarinal region, subcarinal region, and bilateral hilar regions.  A precarinal node on image 70 measures 4.8 x 3.9 cm with a max SUV of 7.4.  A subcarinal node on image 82 measures 7.8 x 3.7 cm with a max SUV of 8.1.  No pleural or pericardial effusion are present.     Abdomen/pelvis:     There is a large hypermetabolic mass arising from the lower pole of the left kidney highly suspicious for renal cell carcinoma.  There is central necrosis within this mass.  The mass appears to involve at least a portion of the left renal hilum but does not appear to extend into the left renal vein.  The mass measures 7.5 x 6.8 x 6.0 cm and has a max SUV of 13.0.  There is an adjacent hypermetabolic periaortic retroperitoneal lymph node consistent with neoplastic adenopathy.  On image 139 the node measures 3.4 x 2.8 cm with a max SUV of 6.6.  The adrenal glands are normal.  No ascites is present.  There is a small fat containing umbilical hernia.     Skeleton:     There is diffuse nonfocal hypermetabolism of the bone marrow  predominantly in the axial skeleton of uncertain significance.  This pattern can be seen with various blood dyscrasias and in smokers, but the possibility of diffuse osseous metastatic disease cannot be excluded.     Impression:     1. Large hypermetabolic lower pole left renal mass highly suspicious for renal cell carcinoma.  There is an adjacent enlarged hypermetabolic retroperitoneal lymph node highly suspicious for neoplastic adenopathy.  2. Innumerable markedly hypermetabolic bilateral pulmonary nodules consistent with metastatic disease.  There is also extensive hypermetabolic mediastinal and bilateral pulmonary hilar lymph lymphadenopathy consistent with metastatic disease.  3. Diffuse nonfocal hypermetabolism predominantly within the axial skeleton of uncertain significance.  This pattern can be seen with various blood dyscrasias and in smokers, but the possibility of diffuse osseous metastatic disease cannot be excluded.       HPI:   66-year-old female with history of hyperlipidemia, hypertension, was referred to us by Dr. Rosario due to recently noted abnormal PET scan.  Interestingly patient has recently complained of unintentional weight loss and persistent cough.  Chest x-ray showed multiple pulmonary nodules suspicious for metastasis.  This necessitated PET-CT scan that revealed findings stated above.    Today she continues to complain weight loss of more than 20 lb over the past 6 months, shortness of breath, persistent nonproductive cough. Also had 2 episodes of hematuria. She denies fever, chills or flank pain.    Denies smoking or alcohol use.  No pertinent family history.    Review of Systems   Constitutional: Positive for fatigue. Negative for activity change, appetite change, chills, fever and unexpected weight change.   HENT: Negative for hearing loss, mouth sores, nosebleeds, sore throat, tinnitus, trouble swallowing and voice change.    Eyes: Negative for visual disturbance.   Respiratory:  Negative for cough, chest tightness and shortness of breath.    Cardiovascular: Negative for chest pain, palpitations and leg swelling.   Gastrointestinal: Negative for abdominal pain, anal bleeding, blood in stool, constipation, diarrhea, nausea and vomiting.   Genitourinary: Negative for dysuria, frequency, hematuria, pelvic pain, vaginal bleeding and vaginal pain.   Musculoskeletal: Positive for back pain. Negative for arthralgias, joint swelling and neck pain.   Skin: Negative for color change, pallor, rash and wound.   Allergic/Immunologic: Negative for immunocompromised state.   Neurological: Positive for weakness. Negative for dizziness, tremors, syncope, speech difficulty, light-headedness and headaches.   Hematological: Negative for adenopathy. Does not bruise/bleed easily.   Psychiatric/Behavioral: Negative for agitation, confusion, decreased concentration, hallucinations and sleep disturbance. The patient is not nervous/anxious.        ?   PAST MEDICAL HISTORY:   Past Medical History:   Diagnosis Date    Hyperlipidemia     Hypertension     Stroke     ?     PAST SURGICAL HISTORY:   Past Surgical History:   Procedure Laterality Date    BRONCHOSCOPY Bilateral 6/6/2022    Procedure: Bronchoscopy - insert lighted tube into airway to take a biopsy of lung;  Surgeon: Michi Ceron MD;  Location: [facility];  Service: Endoscopy;  Laterality: Bilateral;    ENDOBRONCHIAL ULTRASOUND Bilateral 6/6/2022    Procedure: ENDOBRONCHIAL ULTRASOUND (EBUS);  Surgeon: Michi Ceron MD;  Location: [facility];  Service: Endoscopy;  Laterality: Bilateral;    TONSILLECTOMY        ?   ALLERGIES:   Allergies as of 06/15/2022    (No Known Allergies)      ?   MEDICATIONS:?   Outpatient Medications Marked as Taking for the 6/15/22 encounter (Office Visit) with Ghassan Lee MD   Medication Sig Dispense Refill    amLODIPine (NORVASC) 10 MG tablet Take 1 tablet (10 mg total) by mouth once daily. 90 tablet 1     atorvastatin (LIPITOR) 80 MG tablet Take 1 tablet (80 mg total) by mouth once daily. 90 tablet 1    carvediloL (COREG) 6.25 MG tablet Take 1 tablet (6.25 mg total) by mouth 2 (two) times daily with meals. 180 tablet 1    cetirizine (ZYRTEC) 10 MG tablet Take 1 tablet (10 mg total) by mouth once daily. 30 tablet 11    clopidogreL (PLAVIX) 75 mg tablet Take 1 tablet (75 mg total) by mouth once daily. 90 tablet 1    fluticasone propionate (FLONASE) 50 mcg/actuation nasal spray Use 2 sprays to each nostril daily 16 g 0    hydroCHLOROthiazide (HYDRODIURIL) 25 MG tablet Take 1 tablet (25 mg total) by mouth once daily. 90 tablet 1    potassium chloride SA (K-DUR,KLOR-CON) 20 MEQ tablet Take 1 tablet (20 mEq total) by mouth once daily. 90 tablet 1      ?   SOCIAL HISTORY:?   Social History     Tobacco Use    Smoking status: Never Smoker    Smokeless tobacco: Never Used   Substance Use Topics    Alcohol use: No     Alcohol/week: 0.0 standard drinks        ?   FAMILY HISTORY:   family history includes Stroke in her father, mother, and sister.   ?     Objective:      Physical Exam  Constitutional:       General: She is not in acute distress.     Appearance: She is well-developed. She is obese. She is not ill-appearing or toxic-appearing.   HENT:      Head: Normocephalic and atraumatic.      Mouth/Throat:      Pharynx: No oropharyngeal exudate.   Eyes:      General: No scleral icterus.        Right eye: No discharge.         Left eye: No discharge.      Conjunctiva/sclera: Conjunctivae normal.      Pupils: Pupils are equal, round, and reactive to light.   Neck:      Thyroid: No thyromegaly.   Cardiovascular:      Rate and Rhythm: Normal rate and regular rhythm.      Heart sounds: No murmur heard.  Pulmonary:      Effort: Pulmonary effort is normal. No respiratory distress.      Breath sounds: Normal breath sounds.   Chest:      Chest wall: No tenderness.   Breasts:      Right: No supraclavicular adenopathy.      Left:  No supraclavicular adenopathy.       Abdominal:      General: Bowel sounds are normal. There is no distension.      Palpations: Abdomen is soft. There is no mass.      Tenderness: There is no abdominal tenderness. There is no guarding or rebound.   Musculoskeletal:         General: No tenderness. Normal range of motion.      Cervical back: Normal range of motion and neck supple.   Lymphadenopathy:      Cervical: No cervical adenopathy.      Right cervical: No superficial cervical adenopathy.     Left cervical: No superficial cervical adenopathy.      Upper Body:      Right upper body: No supraclavicular or pectoral adenopathy.      Left upper body: No supraclavicular or pectoral adenopathy.   Skin:     General: Skin is warm and dry.      Capillary Refill: Capillary refill takes 2 to 3 seconds.      Coloration: Skin is not pale.      Findings: No erythema or rash.   Neurological:      Mental Status: She is alert and oriented to person, place, and time.      Cranial Nerves: No cranial nerve deficit.      Sensory: No sensory deficit.   Psychiatric:         Behavior: Behavior normal. Behavior is cooperative.         Judgment: Judgment normal.         ?   Vitals:    06/15/22 1257   BP: (!) 142/58   Pulse: 67   Resp: 20   Temp: 97.7 °F (36.5 °C)      ?     ECOG SCORE    2 - Capable of all selfcare but unable to carry out any work activities, active > 50% of hours         ?   Laboratory:  ?   Lab Visit on 06/15/2022   Component Date Value Ref Range Status    WBC 06/15/2022 11.11  3.90 - 12.70 K/uL Final    RBC 06/15/2022 5.20  4.00 - 5.40 M/uL Final    Hemoglobin 06/15/2022 13.3  12.0 - 16.0 g/dL Final    Hematocrit 06/15/2022 44.3  37.0 - 48.5 % Final    MCV 06/15/2022 85  82 - 98 fL Final    MCH 06/15/2022 25.6 (A) 27.0 - 31.0 pg Final    MCHC 06/15/2022 30.0 (A) 32.0 - 36.0 g/dL Final    RDW 06/15/2022 19.3 (A) 11.5 - 14.5 % Final    Platelets 06/15/2022 312  150 - 450 K/uL Final    MPV 06/15/2022 10.0  9.2 -  12.9 fL Final    Immature Granulocytes 06/15/2022 0.6 (A) 0.0 - 0.5 % Final    Gran # (ANC) 06/15/2022 8.3 (A) 1.8 - 7.7 K/uL Final    Immature Grans (Abs) 06/15/2022 0.07 (A) 0.00 - 0.04 K/uL Final    Lymph # 06/15/2022 1.6  1.0 - 4.8 K/uL Final    Mono # 06/15/2022 0.7  0.3 - 1.0 K/uL Final    Eos # 06/15/2022 0.3  0.0 - 0.5 K/uL Final    Baso # 06/15/2022 0.10  0.00 - 0.20 K/uL Final    nRBC 06/15/2022 0  0 /100 WBC Final    Gran % 06/15/2022 74.4 (A) 38.0 - 73.0 % Final    Lymph % 06/15/2022 14.4 (A) 18.0 - 48.0 % Final    Mono % 06/15/2022 6.6  4.0 - 15.0 % Final    Eosinophil % 06/15/2022 3.1  0.0 - 8.0 % Final    Basophil % 06/15/2022 0.9  0.0 - 1.9 % Final    Differential Method 06/15/2022 Automated   Final    Sodium 06/15/2022 141  136 - 145 mmol/L Final    Potassium 06/15/2022 4.4  3.5 - 5.1 mmol/L Final    Chloride 06/15/2022 102  95 - 110 mmol/L Final    CO2 06/15/2022 26  23 - 29 mmol/L Final    Glucose 06/15/2022 124 (A) 70 - 110 mg/dL Final    BUN 06/15/2022 21  8 - 23 mg/dL Final    Creatinine 06/15/2022 1.0  0.5 - 1.4 mg/dL Final    Calcium 06/15/2022 11.2 (A) 8.7 - 10.5 mg/dL Final    Total Protein 06/15/2022 7.0  6.0 - 8.4 g/dL Final    Albumin 06/15/2022 3.4 (A) 3.5 - 5.2 g/dL Final    Total Bilirubin 06/15/2022 0.8  0.1 - 1.0 mg/dL Final    Alkaline Phosphatase 06/15/2022 118  55 - 135 U/L Final    AST 06/15/2022 30  10 - 40 U/L Final    ALT 06/15/2022 13  10 - 44 U/L Final    Anion Gap 06/15/2022 13  8 - 16 mmol/L Final    eGFR if African American 06/15/2022 >60  >60 mL/min/1.73 m^2 Final    eGFR if non  06/15/2022 58 (A) >60 mL/min/1.73 m^2 Final      ?   Tumor markers   ?   ?   Imaging: X-Ray Chest AP Portable  Narrative: EXAMINATION:  XR CHEST AP PORTABLE    CLINICAL HISTORY:  pneumothorax;.    TECHNIQUE:  Single frontal portable view of the chest was performed.    COMPARISON:  05/12/2022    FINDINGS:  Support devices: None    Compared to  05/12/2022, interval increase in peripheral opacification in the right lung possibly related to worsening metastatic disease versus acute infiltrate.  No pneumothorax identified.  Bilateral pulmonary nodules otherwise unchanged.    Heart normal size.  No pleural effusion.    Bones are intact.  Impression: Compared to 05/12/2022, interval increase in peripheral opacification in the right lung possibly related to worsening metastatic disease versus acute infiltrate. No pneumothorax identified. Bilateral pulmonary nodules otherwise unchanged.    Electronically signed by: Walter Hammond  Date:    06/06/2022  Time:    15:18     ?      Pathology:  Pathology Results  (Last 10 years)               06/06/22 1423  Specimen to Pathology, Surgery Pulmonary and Thoracic Final result    Narrative:  Pre-op Diagnosis: Abnormal PET scan, lung [R94.2]   Malignant neoplasm of left kidney [C64.2]   Procedure(s):   Bronchoscopy - insert lighted tube into airway to take a   biopsy of lung   ENDOBRONCHIAL ULTRASOUND (EBUS)   Number of specimens: 3   Name of specimens:   1-RLL Bx   2-RML Bx   3-RUL bx   Which provider would you like to cc?->JAZMYN TORRES   Release to patient->Immediate   Specimen total (fresh, frozen, permanent):->3              ?   Assessment/Plan:       1. Abnormal PET scan, lung    2. Malignant neoplasm of left kidney          Abnormal PET scan, lung  Probable metastatic renal cell carcinoma vs lymphoma.  Reviewed PET scan with patient and answered her questions.  MRI of the brain revealed an area of enhancement in the left frontal bone extending into the soft tissue concerning for metastatic focus.    Interestingly, transbronchial biopsy of right lower lobe, right middle lobe and right upper lobe were all negative for malignancy, rather it showed mild acute/chronic inflammation and reactive changes.     Will order for CT guided lung biopsy. IR notified.     Will plan to discuss this case at tumor conference given the  complexity.      Patient is to return in 2-3 weeks with repeat labs or sooner if needed.    Malignant neoplasm of left kidney  Suspicious for primary renal disease.  Referred to urologist.      ?Abnormal PET scan, lung  -     CBC Auto Differential; Future; Expected date: 06/15/2022  -     Comprehensive Metabolic Panel; Future; Expected date: 06/15/2022  -     IR Biopsy Lung w/ guidance; Future; Expected date: 06/15/2022    Malignant neoplasm of left kidney  -     CBC Auto Differential; Future; Expected date: 06/15/2022  -     Comprehensive Metabolic Panel; Future; Expected date: 06/15/2022      Follow-Up: Follow up in about 3 weeks (around 7/6/2022).    KAITLIN BOGGS Md., Ph.D  Hematology & Oncology Department  Phone #: [phone]

## 2022-06-15 NOTE — TELEPHONE ENCOUNTER
Spoke with Spouse  Path review  Only one slide had atypical cells  Suspicion still high for malignancy  CT biopsy scheduled

## 2022-06-15 NOTE — TELEPHONE ENCOUNTER
----- Message from Urvashi oBrden MA sent at 6/15/2022 10:27 AM CDT -----  Contact: [phone]  Returning your call.   ----- Message -----  From: Flaca Rojas  Sent: 6/15/2022  10:10 AM CDT  To: Osmany Borden Staff    Patient is returning a phone call.  Who left a message for the patient: Dr Ceron's office  Does patient know what this is regarding:  yes  Would you like a call back, or a response through your [facility] portal?:   phone  Comments:

## 2022-06-20 ENCOUNTER — TELEPHONE (OUTPATIENT)
Dept: RADIOLOGY | Facility: HOSPITAL | Age: 67
End: 2022-06-20
Payer: MEDICARE

## 2022-06-20 ENCOUNTER — TELEPHONE (OUTPATIENT)
Dept: HEMATOLOGY/ONCOLOGY | Facility: CLINIC | Age: 67
End: 2022-06-20
Payer: MEDICARE

## 2022-06-20 NOTE — TELEPHONE ENCOUNTER
Interventional Radiology:      Spoke with Sintia, Daughter, and scheduled this pt for lung bx for 6/30/22 @ 8:30am.  Stated that pt has been off of all blood thinners since last bx and has not restarted them. Aware that she needs to be NPO after midnight the night before and will have transportation after her procedure.

## 2022-06-20 NOTE — TELEPHONE ENCOUNTER
Calling to inform that a message has been sent to schedule the bx once I get a time and date I will update

## 2022-06-20 NOTE — TELEPHONE ENCOUNTER
----- Message from Indiana Higuera sent at 6/20/2022  7:30 AM CDT -----  Please call pt daughter Antonia @ [phone] regarding scheduling pt biopsy, need to know status, if not call pt or spouse

## 2022-06-30 ENCOUNTER — HOSPITAL ENCOUNTER (OUTPATIENT)
Dept: RADIOLOGY | Facility: HOSPITAL | Age: 67
Discharge: HOME | End: 2022-06-30
Attending: PHYSICIAN ASSISTANT
Payer: MEDICARE

## 2022-06-30 ENCOUNTER — HOSPITAL ENCOUNTER (OUTPATIENT)
Dept: RADIOLOGY | Facility: HOSPITAL | Age: 67
Discharge: HOME | End: 2022-06-30
Attending: INTERNAL MEDICINE
Payer: MEDICARE

## 2022-06-30 VITALS
SYSTOLIC BLOOD PRESSURE: 146 MMHG | WEIGHT: 180 LBS | BODY MASS INDEX: 28.93 KG/M2 | DIASTOLIC BLOOD PRESSURE: 67 MMHG | HEART RATE: 96 BPM | HEIGHT: 66 IN | OXYGEN SATURATION: 97 % | RESPIRATION RATE: 18 BRPM

## 2022-06-30 DIAGNOSIS — R94.2 ABNORMAL PET SCAN, LUNG: ICD-10-CM

## 2022-06-30 PROCEDURE — 88341 IMHCHEM/IMCYTCHM EA ADD ANTB: CPT | Mod: 26,,, | Performed by: PATHOLOGY

## 2022-06-30 PROCEDURE — 63600175 PHARM REV CODE 636 W HCPCS: Performed by: PHYSICIAN ASSISTANT

## 2022-06-30 PROCEDURE — 88342 IMHCHEM/IMCYTCHM 1ST ANTB: CPT | Performed by: PATHOLOGY

## 2022-06-30 PROCEDURE — 25000003 PHARM REV CODE 250: Performed by: STUDENT IN AN ORGANIZED HEALTH CARE EDUCATION/TRAINING PROGRAM

## 2022-06-30 PROCEDURE — 27200939 IR BIOPSY LUNG W/ GUIDANCE

## 2022-06-30 PROCEDURE — 71045 X-RAY EXAM CHEST 1 VIEW: CPT | Mod: TC

## 2022-06-30 PROCEDURE — 88342 IMHCHEM/IMCYTCHM 1ST ANTB: CPT | Mod: 26,,, | Performed by: PATHOLOGY

## 2022-06-30 PROCEDURE — 88305 TISSUE EXAM BY PATHOLOGIST: CPT | Mod: 26,,, | Performed by: PATHOLOGY

## 2022-06-30 PROCEDURE — 88341 PR IHC OR ICC EACH ADD'L SINGLE ANTIBODY  STAINPR: ICD-10-PCS | Mod: 26,,, | Performed by: PATHOLOGY

## 2022-06-30 PROCEDURE — 88305 TISSUE EXAM BY PATHOLOGIST: CPT | Performed by: PATHOLOGY

## 2022-06-30 PROCEDURE — 88305 TISSUE EXAM BY PATHOLOGIST: ICD-10-PCS | Mod: 26,,, | Performed by: PATHOLOGY

## 2022-06-30 PROCEDURE — 88342 CHG IMMUNOCYTOCHEMISTRY: ICD-10-PCS | Mod: 26,,, | Performed by: PATHOLOGY

## 2022-06-30 PROCEDURE — 88341 IMHCHEM/IMCYTCHM EA ADD ANTB: CPT | Mod: 59 | Performed by: PATHOLOGY

## 2022-06-30 RX ORDER — FENTANYL CITRATE 50 UG/ML
INJECTION, SOLUTION INTRAMUSCULAR; INTRAVENOUS CODE/TRAUMA/SEDATION MEDICATION
Status: COMPLETED | OUTPATIENT
Start: 2022-06-30 | End: 2022-06-30

## 2022-06-30 RX ORDER — MIDAZOLAM HYDROCHLORIDE 1 MG/ML
INJECTION INTRAMUSCULAR; INTRAVENOUS CODE/TRAUMA/SEDATION MEDICATION
Status: COMPLETED | OUTPATIENT
Start: 2022-06-30 | End: 2022-06-30

## 2022-06-30 RX ORDER — LIDOCAINE HYDROCHLORIDE 10 MG/ML
INJECTION INFILTRATION; PERINEURAL CODE/TRAUMA/SEDATION MEDICATION
Status: COMPLETED | OUTPATIENT
Start: 2022-06-30 | End: 2022-06-30

## 2022-06-30 RX ADMIN — FENTANYL CITRATE 25 MCG: 50 INJECTION, SOLUTION INTRAMUSCULAR; INTRAVENOUS at 09:06

## 2022-06-30 RX ADMIN — MIDAZOLAM HYDROCHLORIDE 0.5 MG: 1 INJECTION INTRAMUSCULAR; INTRAVENOUS at 09:06

## 2022-06-30 RX ADMIN — LIDOCAINE HYDROCHLORIDE 5 ML: 10 INJECTION, SOLUTION INFILTRATION; PERINEURAL at 09:06

## 2022-06-30 NOTE — DISCHARGE SUMMARY
Pre Op Diagnosis: bilateral lung masses     Post Op Diagnosis: same     Procedure:  Image guided right lung mass biopsy     Procedure performed by: Isaura VALIENTE, Nimco BAER     Written Informed Consent Obtained: Yes     Specimen Removed:  yes     Estimated Blood Loss:  minimal     Findings: Local anesthesia     The patient tolerated the procedure well and there were no complications.      Sterile technique was performed in the right upper thorax, lidocaine was used as a local anesthetic.  Multiple samples taken from right lung mass.  Patient tolerated the procedure well without immediate complications.  Please see radiologist report for details. F/u with PCP and/or ordering physician.

## 2022-06-30 NOTE — PLAN OF CARE
Patient received from procedure area AAOx4 via stretcher on right side.  Petroleum gauze covered with tegaderm noted to right side C/D/I.  Report received from AMADOR Matthew. Will continue to monitor.

## 2022-06-30 NOTE — SEDATION DOCUMENTATION
Procedure completed at this time. Pt tolerated well. Petroleum gauze/tegaderm dressing to right chest procedure site. VSS. Will do CXR immediately and at 3 HR post procedure.

## 2022-06-30 NOTE — INTERVAL H&P NOTE
The patient has been examined and the H&P has been reviewed:    I concur with the findings and no changes have occurred since H&P was written.    Plan for image guided lung mass biopsy      There are no hospital problems to display for this patient.

## 2022-06-30 NOTE — SEDATION DOCUMENTATION
Pt transferred to CT table from stretcher and placed in supine position. CM applied to patient. VSS and NADN. Pt verbalized understanding of procedure. Will continue to monitor.

## 2022-06-30 NOTE — SEDATION DOCUMENTATION
Pt transferred to stretcher, placed on right side, and transported tor recovery area. Dressing to right chest CDI. Pt reporting 3/10 pain at this time. Will do initial and 3 hour post CXR. Bedside report given to Christoph ENGLISH. Updated pt's spouse, Supriya, who is at pt's bedside now.

## 2022-07-05 ENCOUNTER — TELEPHONE (OUTPATIENT)
Dept: HEMATOLOGY/ONCOLOGY | Facility: CLINIC | Age: 67
End: 2022-07-05
Payer: MEDICARE

## 2022-07-05 LAB
COMMENT: ABNORMAL
FINAL PATHOLOGIC DIAGNOSIS: ABNORMAL
GROSS: ABNORMAL
Lab: ABNORMAL

## 2022-07-05 NOTE — TELEPHONE ENCOUNTER
----- Message from Ghassan Lee MD sent at 7/5/2022  2:19 PM CDT -----  Schedule her for Friday instead so we can discuss her at Friday tumor board before I see her

## 2022-07-06 ENCOUNTER — PATIENT MESSAGE (OUTPATIENT)
Dept: HEMATOLOGY/ONCOLOGY | Facility: CLINIC | Age: 67
End: 2022-07-06
Payer: MEDICARE

## 2022-07-07 ENCOUNTER — TELEPHONE (OUTPATIENT)
Dept: HEMATOLOGY/ONCOLOGY | Facility: CLINIC | Age: 67
End: 2022-07-07
Payer: MEDICARE

## 2022-07-07 ENCOUNTER — OFFICE VISIT (OUTPATIENT)
Dept: UROLOGY | Facility: CLINIC | Age: 67
End: 2022-07-07
Payer: MEDICARE

## 2022-07-07 ENCOUNTER — LAB VISIT (OUTPATIENT)
Dept: LAB | Facility: HOSPITAL | Age: 67
End: 2022-07-07
Attending: INTERNAL MEDICINE
Payer: MEDICARE

## 2022-07-07 VITALS — HEIGHT: 66 IN | BODY MASS INDEX: 28.93 KG/M2 | WEIGHT: 180 LBS

## 2022-07-07 DIAGNOSIS — R94.2 ABNORMAL PET SCAN, LUNG: ICD-10-CM

## 2022-07-07 DIAGNOSIS — C64.2 MALIGNANT NEOPLASM OF LEFT KIDNEY: ICD-10-CM

## 2022-07-07 DIAGNOSIS — C64.2 MALIGNANT NEOPLASM OF LEFT KIDNEY: Primary | ICD-10-CM

## 2022-07-07 LAB
ALBUMIN SERPL BCP-MCNC: 3.3 G/DL (ref 3.5–5.2)
ALP SERPL-CCNC: 129 U/L (ref 55–135)
ALT SERPL W/O P-5'-P-CCNC: 10 U/L (ref 10–44)
ANION GAP SERPL CALC-SCNC: 10 MMOL/L (ref 8–16)
AST SERPL-CCNC: 38 U/L (ref 10–40)
BASOPHILS # BLD AUTO: 0.13 K/UL (ref 0–0.2)
BASOPHILS NFR BLD: 1.2 % (ref 0–1.9)
BILIRUB SERPL-MCNC: 0.8 MG/DL (ref 0.1–1)
BUN SERPL-MCNC: 26 MG/DL (ref 8–23)
CALCIUM SERPL-MCNC: 12.1 MG/DL (ref 8.7–10.5)
CHLORIDE SERPL-SCNC: 102 MMOL/L (ref 95–110)
CO2 SERPL-SCNC: 29 MMOL/L (ref 23–29)
CREAT SERPL-MCNC: 1.1 MG/DL (ref 0.5–1.4)
DIFFERENTIAL METHOD: ABNORMAL
EOSINOPHIL # BLD AUTO: 0.8 K/UL (ref 0–0.5)
EOSINOPHIL NFR BLD: 7.2 % (ref 0–8)
ERYTHROCYTE [DISTWIDTH] IN BLOOD BY AUTOMATED COUNT: 19.9 % (ref 11.5–14.5)
EST. GFR  (AFRICAN AMERICAN): 60 ML/MIN/1.73 M^2
EST. GFR  (NON AFRICAN AMERICAN): 52 ML/MIN/1.73 M^2
GLUCOSE SERPL-MCNC: 101 MG/DL (ref 70–110)
HCT VFR BLD AUTO: 47.6 % (ref 37–48.5)
HGB BLD-MCNC: 14.3 G/DL (ref 12–16)
IMM GRANULOCYTES # BLD AUTO: 0.05 K/UL (ref 0–0.04)
IMM GRANULOCYTES NFR BLD AUTO: 0.5 % (ref 0–0.5)
LYMPHOCYTES # BLD AUTO: 1.5 K/UL (ref 1–4.8)
LYMPHOCYTES NFR BLD: 13.5 % (ref 18–48)
MCH RBC QN AUTO: 25.7 PG (ref 27–31)
MCHC RBC AUTO-ENTMCNC: 30 G/DL (ref 32–36)
MCV RBC AUTO: 86 FL (ref 82–98)
MONOCYTES # BLD AUTO: 0.8 K/UL (ref 0.3–1)
MONOCYTES NFR BLD: 7.3 % (ref 4–15)
NEUTROPHILS # BLD AUTO: 7.5 K/UL (ref 1.8–7.7)
NEUTROPHILS NFR BLD: 70.3 % (ref 38–73)
NRBC BLD-RTO: 0 /100 WBC
PLATELET # BLD AUTO: 355 K/UL (ref 150–450)
PMV BLD AUTO: 9.5 FL (ref 9.2–12.9)
POTASSIUM SERPL-SCNC: 3.6 MMOL/L (ref 3.5–5.1)
PROT SERPL-MCNC: 7.5 G/DL (ref 6–8.4)
RBC # BLD AUTO: 5.56 M/UL (ref 4–5.4)
SODIUM SERPL-SCNC: 141 MMOL/L (ref 136–145)
WBC # BLD AUTO: 10.71 K/UL (ref 3.9–12.7)

## 2022-07-07 PROCEDURE — 1126F AMNT PAIN NOTED NONE PRSNT: CPT | Mod: CPTII,S$GLB,, | Performed by: UROLOGY

## 2022-07-07 PROCEDURE — 3288F FALL RISK ASSESSMENT DOCD: CPT | Mod: CPTII,S$GLB,, | Performed by: UROLOGY

## 2022-07-07 PROCEDURE — 36415 COLL VENOUS BLD VENIPUNCTURE: CPT | Performed by: INTERNAL MEDICINE

## 2022-07-07 PROCEDURE — 1101F PT FALLS ASSESS-DOCD LE1/YR: CPT | Mod: CPTII,S$GLB,, | Performed by: UROLOGY

## 2022-07-07 PROCEDURE — 99213 PR OFFICE/OUTPT VISIT, EST, LEVL III, 20-29 MIN: ICD-10-PCS | Mod: S$GLB,,, | Performed by: UROLOGY

## 2022-07-07 PROCEDURE — 1159F MED LIST DOCD IN RCRD: CPT | Mod: CPTII,S$GLB,, | Performed by: UROLOGY

## 2022-07-07 PROCEDURE — 3008F PR BODY MASS INDEX (BMI) DOCUMENTED: ICD-10-PCS | Mod: CPTII,S$GLB,, | Performed by: UROLOGY

## 2022-07-07 PROCEDURE — 1160F RVW MEDS BY RX/DR IN RCRD: CPT | Mod: CPTII,S$GLB,, | Performed by: UROLOGY

## 2022-07-07 PROCEDURE — 3044F HG A1C LEVEL LT 7.0%: CPT | Mod: CPTII,S$GLB,, | Performed by: UROLOGY

## 2022-07-07 PROCEDURE — 99999 PR PBB SHADOW E&M-EST. PATIENT-LVL III: CPT | Mod: PBBFAC,,, | Performed by: UROLOGY

## 2022-07-07 PROCEDURE — 3044F PR MOST RECENT HEMOGLOBIN A1C LEVEL <7.0%: ICD-10-PCS | Mod: CPTII,S$GLB,, | Performed by: UROLOGY

## 2022-07-07 PROCEDURE — 3008F BODY MASS INDEX DOCD: CPT | Mod: CPTII,S$GLB,, | Performed by: UROLOGY

## 2022-07-07 PROCEDURE — 99213 OFFICE O/P EST LOW 20 MIN: CPT | Mod: S$GLB,,, | Performed by: UROLOGY

## 2022-07-07 PROCEDURE — 80053 COMPREHEN METABOLIC PANEL: CPT | Performed by: INTERNAL MEDICINE

## 2022-07-07 PROCEDURE — 1101F PR PT FALLS ASSESS DOC 0-1 FALLS W/OUT INJ PAST YR: ICD-10-PCS | Mod: CPTII,S$GLB,, | Performed by: UROLOGY

## 2022-07-07 PROCEDURE — 85025 COMPLETE CBC W/AUTO DIFF WBC: CPT | Performed by: INTERNAL MEDICINE

## 2022-07-07 PROCEDURE — 1160F PR REVIEW ALL MEDS BY PRESCRIBER/CLIN PHARMACIST DOCUMENTED: ICD-10-PCS | Mod: CPTII,S$GLB,, | Performed by: UROLOGY

## 2022-07-07 PROCEDURE — 99999 PR PBB SHADOW E&M-EST. PATIENT-LVL III: ICD-10-PCS | Mod: PBBFAC,,, | Performed by: UROLOGY

## 2022-07-07 PROCEDURE — 1159F PR MEDICATION LIST DOCUMENTED IN MEDICAL RECORD: ICD-10-PCS | Mod: CPTII,S$GLB,, | Performed by: UROLOGY

## 2022-07-07 PROCEDURE — 1126F PR PAIN SEVERITY QUANTIFIED, NO PAIN PRESENT: ICD-10-PCS | Mod: CPTII,S$GLB,, | Performed by: UROLOGY

## 2022-07-07 PROCEDURE — 3288F PR FALLS RISK ASSESSMENT DOCUMENTED: ICD-10-PCS | Mod: CPTII,S$GLB,, | Performed by: UROLOGY

## 2022-07-07 NOTE — TELEPHONE ENCOUNTER
----- Message from Ghassan Lee MD sent at 7/7/2022  2:48 PM CDT -----  Recommend going to the ER immediately for treatment of high calcium.

## 2022-07-07 NOTE — PROGRESS NOTES
Chief Complaint:   Encounter Diagnoses   Name Primary?    Malignant neoplasm of left kidney Yes    Abnormal PET scan, lung        HPI:    7/7/22- patient returns today to discuss biopsy findings.  66-year-old female who comes in with newly diagnosed left renal mass.  Initially diagnosed after cough was found a chest x-ray demonstrated some pulmonary lesions.  Further workup demonstrated enlarged lymph nodes, now possible area within the frontal bone on her skull a large right renal mass.  Referral from Hematology-Oncology, no previous urological history, no gynecological history.  She has all of her female organs, no incontinence.  Mild urge incontinence she states that is okay and not needing medical management.  No history of slings, no recurrent UTIs, recent gross hematuria times to few weeks ago, no history of smoking, no family history of urological cancers or stones.    Allergies:  Patient has no known allergies.    Medications:  has a current medication list which includes the following prescription(s): amlodipine, atorvastatin, carvedilol, cetirizine, clopidogrel, fluticasone propionate, hydrochlorothiazide, potassium chloride sa, and aspirin.    Review of Systems:  General: No fever, chills, fatigability, or weight loss.  Skin: No rashes, itching, or changes in color or texture of skin.  Chest: Denies TIM, cyanosis, wheezing, cough, and sputum production.  Abdomen: Appetite fine. No weight loss. Denies diarrhea, abdominal pain, hematemesis, or blood in stool.  Musculoskeletal: No joint stiffness or swelling. Denies back pain.  : As above.  All other review of systems negative.    PMH:   has a past medical history of Hyperlipidemia, Hypertension, and Stroke.    PSH:   has a past surgical history that includes Tonsillectomy; Bronchoscopy (Bilateral, 6/6/2022); and Endobronchial ultrasound (Bilateral, 6/6/2022).    FamHx: family history includes Stroke in her father, mother, and sister.    SocHx:  reports  that she has never smoked. She has never used smokeless tobacco. She reports that she does not drink alcohol and does not use drugs.      Physical Exam:  There were no vitals filed for this visit.  General: A&Ox3, no apparent distress, no deformities  Neck: No masses, normal ROM  Lungs: normal inspiration, no use of accessory muscles  Heart: normal pulse, no arrhythmias  Abdomen: Soft, NT, ND, no masses, no hernias, no hepatosplenomegaly  Skin: The skin is warm and dry. No jaundice.  Ext: No c/c/e.    Labs/Studies:   Pulmonary bx metastatic RCC 6/30/22  MRI brain left frontal bone enhancement 5/22  PET/CT 7.5 cm left renal lesion highly suspicious for renal cell carcinoma, hypermetabolic bilateral pulmonary nodules possibly consistent with metastatic disease, mediastinal and pulmonary hilar lymphadenopathy, possibly consistent with metastatic disease possible osseous metastatic disease 5/22  Creatinine 1.0, UA normal 5/22    Impression/Plan:     Metastatic renal cell carcinoma- lung biopsies have returned as metastatic renal cell carcinoma.  She has an appointment with her oncologist tomorrow to discuss plans.  Will likely need neoadjuvant therapy prior to any surgical options.  See her back in 6 weeks and monitor closely.  Will await their recommendations tomorrow.

## 2022-07-08 ENCOUNTER — OFFICE VISIT (OUTPATIENT)
Dept: HEMATOLOGY/ONCOLOGY | Facility: CLINIC | Age: 67
End: 2022-07-08
Payer: MEDICARE

## 2022-07-08 ENCOUNTER — TUMOR BOARD CONFERENCE (OUTPATIENT)
Dept: HEMATOLOGY/ONCOLOGY | Facility: CLINIC | Age: 67
End: 2022-07-08
Payer: MEDICARE

## 2022-07-08 ENCOUNTER — TELEPHONE (OUTPATIENT)
Dept: FAMILY MEDICINE | Facility: CLINIC | Age: 67
End: 2022-07-08
Payer: MEDICARE

## 2022-07-08 ENCOUNTER — SPECIALTY PHARMACY (OUTPATIENT)
Dept: PHARMACY | Facility: CLINIC | Age: 67
End: 2022-07-08
Payer: MEDICARE

## 2022-07-08 VITALS
SYSTOLIC BLOOD PRESSURE: 143 MMHG | WEIGHT: 176.56 LBS | HEART RATE: 75 BPM | OXYGEN SATURATION: 93 % | BODY MASS INDEX: 28.38 KG/M2 | DIASTOLIC BLOOD PRESSURE: 73 MMHG | HEIGHT: 66 IN

## 2022-07-08 DIAGNOSIS — C64.2 MALIGNANT NEOPLASM OF LEFT KIDNEY: Primary | ICD-10-CM

## 2022-07-08 PROCEDURE — 3077F PR MOST RECENT SYSTOLIC BLOOD PRESSURE >= 140 MM HG: ICD-10-PCS | Mod: CPTII,S$GLB,, | Performed by: INTERNAL MEDICINE

## 2022-07-08 PROCEDURE — 1126F PR PAIN SEVERITY QUANTIFIED, NO PAIN PRESENT: ICD-10-PCS | Mod: CPTII,S$GLB,, | Performed by: INTERNAL MEDICINE

## 2022-07-08 PROCEDURE — 99999 PR PBB SHADOW E&M-EST. PATIENT-LVL IV: ICD-10-PCS | Mod: PBBFAC,,, | Performed by: INTERNAL MEDICINE

## 2022-07-08 PROCEDURE — 3288F PR FALLS RISK ASSESSMENT DOCUMENTED: ICD-10-PCS | Mod: CPTII,S$GLB,, | Performed by: INTERNAL MEDICINE

## 2022-07-08 PROCEDURE — 99215 OFFICE O/P EST HI 40 MIN: CPT | Mod: S$GLB,,, | Performed by: INTERNAL MEDICINE

## 2022-07-08 PROCEDURE — 3078F DIAST BP <80 MM HG: CPT | Mod: CPTII,S$GLB,, | Performed by: INTERNAL MEDICINE

## 2022-07-08 PROCEDURE — 3008F PR BODY MASS INDEX (BMI) DOCUMENTED: ICD-10-PCS | Mod: CPTII,S$GLB,, | Performed by: INTERNAL MEDICINE

## 2022-07-08 PROCEDURE — 99215 PR OFFICE/OUTPT VISIT, EST, LEVL V, 40-54 MIN: ICD-10-PCS | Mod: S$GLB,,, | Performed by: INTERNAL MEDICINE

## 2022-07-08 PROCEDURE — 3078F PR MOST RECENT DIASTOLIC BLOOD PRESSURE < 80 MM HG: ICD-10-PCS | Mod: CPTII,S$GLB,, | Performed by: INTERNAL MEDICINE

## 2022-07-08 PROCEDURE — 3044F PR MOST RECENT HEMOGLOBIN A1C LEVEL <7.0%: ICD-10-PCS | Mod: CPTII,S$GLB,, | Performed by: INTERNAL MEDICINE

## 2022-07-08 PROCEDURE — 99999 PR PBB SHADOW E&M-EST. PATIENT-LVL IV: CPT | Mod: PBBFAC,,, | Performed by: INTERNAL MEDICINE

## 2022-07-08 PROCEDURE — 1159F PR MEDICATION LIST DOCUMENTED IN MEDICAL RECORD: ICD-10-PCS | Mod: CPTII,S$GLB,, | Performed by: INTERNAL MEDICINE

## 2022-07-08 PROCEDURE — 3008F BODY MASS INDEX DOCD: CPT | Mod: CPTII,S$GLB,, | Performed by: INTERNAL MEDICINE

## 2022-07-08 PROCEDURE — 1126F AMNT PAIN NOTED NONE PRSNT: CPT | Mod: CPTII,S$GLB,, | Performed by: INTERNAL MEDICINE

## 2022-07-08 PROCEDURE — 3077F SYST BP >= 140 MM HG: CPT | Mod: CPTII,S$GLB,, | Performed by: INTERNAL MEDICINE

## 2022-07-08 PROCEDURE — 3288F FALL RISK ASSESSMENT DOCD: CPT | Mod: CPTII,S$GLB,, | Performed by: INTERNAL MEDICINE

## 2022-07-08 PROCEDURE — 3044F HG A1C LEVEL LT 7.0%: CPT | Mod: CPTII,S$GLB,, | Performed by: INTERNAL MEDICINE

## 2022-07-08 PROCEDURE — 1101F PT FALLS ASSESS-DOCD LE1/YR: CPT | Mod: CPTII,S$GLB,, | Performed by: INTERNAL MEDICINE

## 2022-07-08 PROCEDURE — 1101F PR PT FALLS ASSESS DOC 0-1 FALLS W/OUT INJ PAST YR: ICD-10-PCS | Mod: CPTII,S$GLB,, | Performed by: INTERNAL MEDICINE

## 2022-07-08 PROCEDURE — 1159F MED LIST DOCD IN RCRD: CPT | Mod: CPTII,S$GLB,, | Performed by: INTERNAL MEDICINE

## 2022-07-08 RX ORDER — PROCHLORPERAZINE MALEATE 5 MG
5 TABLET ORAL EVERY 6 HOURS PRN
Qty: 90 TABLET | Refills: 1 | Status: ON HOLD | OUTPATIENT
Start: 2022-07-08 | End: 2022-09-06 | Stop reason: SDUPTHER

## 2022-07-08 RX ORDER — ONDANSETRON 8 MG/1
8 TABLET, ORALLY DISINTEGRATING ORAL EVERY 12 HOURS PRN
Qty: 90 TABLET | Refills: 1 | Status: ON HOLD | OUTPATIENT
Start: 2022-07-08 | End: 2022-09-06 | Stop reason: SDUPTHER

## 2022-07-08 NOTE — TELEPHONE ENCOUNTER
Alan, this is Douglas Ansari with [facility].  We are working on your prescription that your doctor has sent us. We will be working with your insurance to get this approved for you. We will be calling you along the way with updates on your medication.  If you have any questions, you can reach us at [phone]     Welcome call outcome: Patient/caregiver reached     Spoke with pt's .

## 2022-07-08 NOTE — TELEPHONE ENCOUNTER
Junie FOSTER approved from 06/08/2022 through 07/07/2025.   Test claim: $3,168.04 copay. Pt has [employer] HMO medicare for insurance.  Pt might benefit from BI/FA.

## 2022-07-08 NOTE — TELEPHONE ENCOUNTER
Spoke with patient's daughter. She states the patient was advised to hold her aspirin and plavix prior to biopsy performed last week and would like to know if it is safe to resume. Please advise.

## 2022-07-08 NOTE — TELEPHONE ENCOUNTER
Inlyta - PA submitted via [facility]  Key: [id number] - PA Case ID: [id number]

## 2022-07-08 NOTE — ASSESSMENT & PLAN NOTE
Metastatic renal cell carcinoma. Reviewed pathology report from right lung biopsy. MRI of the brain revealed an area of enhancement in the left frontal bone extending into the soft tissue concerning for metastatic focus.     Will place referral to [facility]. Advised that her disease is incurable but can be treated.    Regimen:  Pembrolizumab plus axitinib      Will plan to discuss this case at tumor conference per patient request.    Will obtain 2D-ECHO and mediport prior to initiating treatment. RTC in 2 weeks with labs.

## 2022-07-08 NOTE — PROGRESS NOTES
Subjective:   Date of Visit: 7/8/22   ?   ?    REFERRING PROVIDER: No referring provider defined for this encounter.   ?   CHIEF COMPLAINT: Probable metastatic renal cell carcinoma???????   ?   ONCOLOGIC DIAGNOSIS:  Probable stage for renal cell carcinoma  ?   CURRENT TREATMENT:  None    PAST TREATMENT:  None  ?   ONCOLOGIC HISTORY:     PET scan:  05/17/2022  Chest:     There are innumerable markedly hypermetabolic nodules scattered throughout both lungs highly suspicious for metastatic disease.  A large coalescent mass of nodules in the superior segment of the right lower lobe on image 72 measures 5.0 x 4.4 cm with a max SUV of 12.4.  One of the larger nodules in the left lung in the anterior aspect of the left upper lobe on image 59 measures 2.7 x 2.7 cm with a max SUV of 8.6.  There is extensive hypermetabolic lymphadenopathy with hypermetabolic enlarged nodes in the right paratracheal region, precarinal region, subcarinal region, and bilateral hilar regions.  A precarinal node on image 70 measures 4.8 x 3.9 cm with a max SUV of 7.4.  A subcarinal node on image 82 measures 7.8 x 3.7 cm with a max SUV of 8.1.  No pleural or pericardial effusion are present.     Abdomen/pelvis:     There is a large hypermetabolic mass arising from the lower pole of the left kidney highly suspicious for renal cell carcinoma.  There is central necrosis within this mass.  The mass appears to involve at least a portion of the left renal hilum but does not appear to extend into the left renal vein.  The mass measures 7.5 x 6.8 x 6.0 cm and has a max SUV of 13.0.  There is an adjacent hypermetabolic periaortic retroperitoneal lymph node consistent with neoplastic adenopathy.  On image 139 the node measures 3.4 x 2.8 cm with a max SUV of 6.6.  The adrenal glands are normal.  No ascites is present.  There is a small fat containing umbilical hernia.     Skeleton:     There is diffuse nonfocal hypermetabolism of the bone marrow  predominantly in the axial skeleton of uncertain significance.  This pattern can be seen with various blood dyscrasias and in smokers, but the possibility of diffuse osseous metastatic disease cannot be excluded.     Impression:     1. Large hypermetabolic lower pole left renal mass highly suspicious for renal cell carcinoma.  There is an adjacent enlarged hypermetabolic retroperitoneal lymph node highly suspicious for neoplastic adenopathy.  2. Innumerable markedly hypermetabolic bilateral pulmonary nodules consistent with metastatic disease.  There is also extensive hypermetabolic mediastinal and bilateral pulmonary hilar lymph lymphadenopathy consistent with metastatic disease.  3. Diffuse nonfocal hypermetabolism predominantly within the axial skeleton of uncertain significance.  This pattern can be seen with various blood dyscrasias and in smokers, but the possibility of diffuse osseous metastatic disease cannot be excluded.       HPI:   66-year-old female with history of hyperlipidemia, hypertension, was referred to us by Dr. Rosario due to recently noted abnormal PET scan.  Interestingly patient has recently complained of unintentional weight loss and persistent cough.  Chest x-ray showed multiple pulmonary nodules suspicious for metastasis.  This necessitated PET-CT scan that revealed findings stated above.    Today she continues to complain weight loss of more than 20 lb over the past 6 months, shortness of breath, persistent nonproductive cough. Also had 2 episodes of hematuria. She denies fever, chills or flank pain.    Denies smoking or alcohol use.  No pertinent family history.    Interval Hx: 67 yo female with metastatic renal cell carcinoma presents to discuss management,    Review of Systems   Constitutional: Positive for fatigue. Negative for activity change, appetite change, chills, fever and unexpected weight change.   HENT: Negative for hearing loss, mouth sores, nosebleeds, sore throat, tinnitus,  trouble swallowing and voice change.    Eyes: Negative for visual disturbance.   Respiratory: Negative for cough, chest tightness and shortness of breath.    Cardiovascular: Negative for chest pain, palpitations and leg swelling.   Gastrointestinal: Negative for abdominal pain, anal bleeding, blood in stool, constipation, diarrhea, nausea and vomiting.   Genitourinary: Negative for dysuria, frequency, hematuria, pelvic pain, vaginal bleeding and vaginal pain.   Musculoskeletal: Positive for back pain. Negative for arthralgias, joint swelling and neck pain.   Skin: Negative for color change, pallor, rash and wound.   Allergic/Immunologic: Negative for immunocompromised state.   Neurological: Positive for weakness. Negative for dizziness, tremors, syncope, speech difficulty, light-headedness and headaches.   Hematological: Negative for adenopathy. Does not bruise/bleed easily.   Psychiatric/Behavioral: Negative for agitation, confusion, decreased concentration, hallucinations and sleep disturbance. The patient is not nervous/anxious.        ?   PAST MEDICAL HISTORY:   Past Medical History:   Diagnosis Date    Hyperlipidemia     Hypertension     Stroke     ?     PAST SURGICAL HISTORY:   Past Surgical History:   Procedure Laterality Date    BRONCHOSCOPY Bilateral 6/6/2022    Procedure: Bronchoscopy - insert lighted tube into airway to take a biopsy of lung;  Surgeon: Michi Ceron MD;  Location: [facility];  Service: Endoscopy;  Laterality: Bilateral;    ENDOBRONCHIAL ULTRASOUND Bilateral 6/6/2022    Procedure: ENDOBRONCHIAL ULTRASOUND (EBUS);  Surgeon: Michi Ceron MD;  Location: [facility];  Service: Endoscopy;  Laterality: Bilateral;    TONSILLECTOMY        ?   ALLERGIES:   Allergies as of 07/08/2022    (No Known Allergies)      ?   MEDICATIONS:?   No outpatient medications have been marked as taking for the 7/8/22 encounter (Office Visit) with Ghassan Lee MD.      ?   SOCIAL HISTORY:?    Social History     Tobacco Use    Smoking status: Never Smoker    Smokeless tobacco: Never Used   Substance Use Topics    Alcohol use: No     Alcohol/week: 0.0 standard drinks        ?   FAMILY HISTORY:   family history includes Stroke in her father, mother, and sister.   ?     Objective:      Physical Exam  Constitutional:       General: She is not in acute distress.     Appearance: She is well-developed. She is obese. She is not ill-appearing or toxic-appearing.   HENT:      Head: Normocephalic and atraumatic.      Mouth/Throat:      Pharynx: No oropharyngeal exudate.   Eyes:      General: No scleral icterus.        Right eye: No discharge.         Left eye: No discharge.      Conjunctiva/sclera: Conjunctivae normal.      Pupils: Pupils are equal, round, and reactive to light.   Neck:      Thyroid: No thyromegaly.   Cardiovascular:      Rate and Rhythm: Normal rate and regular rhythm.      Heart sounds: No murmur heard.  Pulmonary:      Effort: Pulmonary effort is normal. No respiratory distress.      Breath sounds: Normal breath sounds.   Chest:      Chest wall: No tenderness.   Breasts:      Right: No supraclavicular adenopathy.      Left: No supraclavicular adenopathy.       Abdominal:      General: Bowel sounds are normal. There is no distension.      Palpations: Abdomen is soft. There is no mass.      Tenderness: There is no abdominal tenderness. There is no guarding or rebound.   Musculoskeletal:         General: No tenderness. Normal range of motion.      Cervical back: Normal range of motion and neck supple.   Lymphadenopathy:      Cervical: No cervical adenopathy.      Right cervical: No superficial cervical adenopathy.     Left cervical: No superficial cervical adenopathy.      Upper Body:      Right upper body: No supraclavicular or pectoral adenopathy.      Left upper body: No supraclavicular or pectoral adenopathy.   Skin:     General: Skin is warm and dry.      Capillary Refill: Capillary refill  takes 2 to 3 seconds.      Coloration: Skin is not pale.      Findings: No erythema or rash.   Neurological:      Mental Status: She is alert and oriented to person, place, and time.      Cranial Nerves: No cranial nerve deficit.      Sensory: No sensory deficit.   Psychiatric:         Behavior: Behavior normal. Behavior is cooperative.         Judgment: Judgment normal.         ?   There were no vitals filed for this visit.   ?     ECOG SCORE    1 - Restricted in strenuous activity-ambulatory and able to carry out work of a light nature         ?   Laboratory:  ?   No visits with results within 1 Day(s) from this visit.   Latest known visit with results is:   Lab Visit on 07/07/2022   Component Date Value Ref Range Status    WBC 07/07/2022 10.71  3.90 - 12.70 K/uL Final    RBC 07/07/2022 5.56 (A) 4.00 - 5.40 M/uL Final    Hemoglobin 07/07/2022 14.3  12.0 - 16.0 g/dL Final    Hematocrit 07/07/2022 47.6  37.0 - 48.5 % Final    MCV 07/07/2022 86  82 - 98 fL Final    MCH 07/07/2022 25.7 (A) 27.0 - 31.0 pg Final    MCHC 07/07/2022 30.0 (A) 32.0 - 36.0 g/dL Final    RDW 07/07/2022 19.9 (A) 11.5 - 14.5 % Final    Platelets 07/07/2022 355  150 - 450 K/uL Final    MPV 07/07/2022 9.5  9.2 - 12.9 fL Final    Immature Granulocytes 07/07/2022 0.5  0.0 - 0.5 % Final    Gran # (ANC) 07/07/2022 7.5  1.8 - 7.7 K/uL Final    Immature Grans (Abs) 07/07/2022 0.05 (A) 0.00 - 0.04 K/uL Final    Lymph # 07/07/2022 1.5  1.0 - 4.8 K/uL Final    Mono # 07/07/2022 0.8  0.3 - 1.0 K/uL Final    Eos # 07/07/2022 0.8 (A) 0.0 - 0.5 K/uL Final    Baso # 07/07/2022 0.13  0.00 - 0.20 K/uL Final    nRBC 07/07/2022 0  0 /100 WBC Final    Gran % 07/07/2022 70.3  38.0 - 73.0 % Final    Lymph % 07/07/2022 13.5 (A) 18.0 - 48.0 % Final    Mono % 07/07/2022 7.3  4.0 - 15.0 % Final    Eosinophil % 07/07/2022 7.2  0.0 - 8.0 % Final    Basophil % 07/07/2022 1.2  0.0 - 1.9 % Final    Differential Method 07/07/2022 Automated   Final     Sodium 07/07/2022 141  136 - 145 mmol/L Final    Potassium 07/07/2022 3.6  3.5 - 5.1 mmol/L Final    Chloride 07/07/2022 102  95 - 110 mmol/L Final    CO2 07/07/2022 29  23 - 29 mmol/L Final    Glucose 07/07/2022 101  70 - 110 mg/dL Final    BUN 07/07/2022 26 (A) 8 - 23 mg/dL Final    Creatinine 07/07/2022 1.1  0.5 - 1.4 mg/dL Final    Calcium 07/07/2022 12.1 (A) 8.7 - 10.5 mg/dL Final    Total Protein 07/07/2022 7.5  6.0 - 8.4 g/dL Final    Albumin 07/07/2022 3.3 (A) 3.5 - 5.2 g/dL Final    Total Bilirubin 07/07/2022 0.8  0.1 - 1.0 mg/dL Final    Alkaline Phosphatase 07/07/2022 129  55 - 135 U/L Final    AST 07/07/2022 38  10 - 40 U/L Final    ALT 07/07/2022 10  10 - 44 U/L Final    Anion Gap 07/07/2022 10  8 - 16 mmol/L Final    eGFR if  07/07/2022 60  >60 mL/min/1.73 m^2 Final    eGFR if non African American 07/07/2022 52 (A) >60 mL/min/1.73 m^2 Final      ?   Tumor markers   ?   ?   Imaging: IR Biopsy Lung w/ guidance  Narrative: EXAMINATION:  Image-guided lung biopsy    Procedural Personnel    Attending physician(s): Sara Delarosa MD    Fellow physician(s): None    Resident physician(s): None    Advanced practice provider(s): Mendel FOSTER    Pre-procedure diagnosis: Innumerable lung nodules with large left renal mass    Post-procedure diagnosis: Same    Indication: Histopathologic diagnosis    Previous biopsy of same target (QCDR): No    Additional clinical history: Previously scheduled right retroperitoneal biopsy aborted secondary to proximity to right renal artery 06/03/2022.    Complications: No immediate complications.    TECHNIQUE:  - Percutaneous CT -guided coaxial core needle right lung mass biopsy    FINDINGS:  Pre-procedure    Reference imaging for biopsy target: None    Consent: Informed consent for the procedure was obtained and time-out was performed prior to the procedure.    Preparation: The site was prepared and draped using maximal sterile barrier  technique including cutaneous antisepsis.    Anesthesia/sedation    Level of anesthesia/sedation: Moderate sedation (conscious sedation)    Anesthesia/sedation administered by: Independent trained observer under attending supervision with continuous monitoring of the patient's level of consciousness and physiologic status as the sole assigned responsibility    Total intra-service sedation time (minutes): 20    Biopsy    Large conglomerate of nodules within the lateral aspect of the right upper lobe selected for biopsy.  Local anesthesia was administered. Under imaging guidance as stated in the procedure summary, the biopsy needle was advanced to the target and biopsy was performed.    Coaxial needle: 19 gauge    Core needle biopsy device: Heetch    Core needle size: 20 gauge    Number of core specimens: 4    On-site biopsy touch preparation: No    Additional sampling recommendations: None    Preliminary assessment of sample adequacy: Not applicable    Needle removal    The biopsy needle was removed and a sterile dressing was applied.  Postprocedural CT demonstrates no evidence for complication.  Follow-up chest radiographs performed immediately and 3 hours postprocedure also demonstrate no evidence for complication.    Tract embolization: None    Contrast    Contrast agent: None    Contrast volume (mL): 0    Radiation Dose    CT dose length product ( mGy-cm): 1034.73    Additional Details    Additional description of procedure: None    Equipment details: None    Specimens removed: Biopsy samples as detailed above    Estimated blood loss (mL): Less than 10    Standardized report: [email or web address]  Impression: Image-guided biopsy of hypermetabolic right lung mass.    Plan:    Specimen(s) sent for evaluation.    Attestation    Signer name: Sara Delarosa MD    I attest that I was present for the entire procedure. I reviewed the stored images and agree with the report as  written.    _______________________________________________________________    Electronically signed by: Sara Delarosa  Date:    06/30/2022  Time:    17:33  X-Ray Chest 1 View  Narrative: EXAMINATION:  XR CHEST 1 VIEW    CLINICAL HISTORY:  3 hr s/p right lung mass biopsy; show film to radiologist;.    TECHNIQUE:  Single frontal portable view of the chest was performed.    COMPARISON:  06/30/2022 09:59    FINDINGS:  No evidence of pneumothorax status post right lung biopsy.  Impression: As above.    Electronically signed by: Walter Hammond  Date:    06/30/2022  Time:    12:45  X-Ray Chest 1 View  Narrative: EXAMINATION:  XR CHEST 1 VIEW    CLINICAL HISTORY:  s/p right lung mass biopsy; show film to radiologist;.    TECHNIQUE:  Single frontal portable view of the chest was performed.    COMPARISON:  Lung biopsy same day    FINDINGS:  No pneumothorax or acute abnormality status post right lung biopsy.  Impression: As above.    Electronically signed by: Walter Hammond  Date:    06/30/2022  Time:    10:34     ?      Pathology:  Pathology Results  (Last 10 years)               06/30/22 0928  Specimen to Pathology, Radiology Lung, biopsy not tranplant (Abnormal) Final result    Narrative:  Release to patient->Immediate       06/06/22 1423  Specimen to Pathology, Surgery Pulmonary and Thoracic Final result    Narrative:  Pre-op Diagnosis: Abnormal PET scan, lung [R94.2]   Malignant neoplasm of left kidney [C64.2]   Procedure(s):   Bronchoscopy - insert lighted tube into airway to take a   biopsy of lung   ENDOBRONCHIAL ULTRASOUND (EBUS)   Number of specimens: 3   Name of specimens:   1-RLL Bx   2-RML Bx   3-RUL bx   Which provider would you like to cc?->JAZMYN TORRES   Release to patient->Immediate   Specimen total (fresh, frozen, permanent):->3              ?   Assessment/Plan:       1. Malignant neoplasm of left kidney          Malignant neoplasm of left kidney  Metastatic renal cell carcinoma. Reviewed pathology report  from right lung biopsy. MRI of the brain revealed an area of enhancement in the left frontal bone extending into the soft tissue concerning for metastatic focus.     Will place referral to [facility]. Advised that her disease is incurable but can be treated.    Regimen:  Pembrolizumab plus axitinib      Will plan to discuss this case at tumor conference per patient request.    Will obtain 2D-ECHO and mediport prior to initiating treatment. RTC in 2 weeks with labs.          ?Malignant neoplasm of left kidney  -     CBC Auto Differential; Future; Expected date: 07/08/2022  -     Comprehensive Metabolic Panel; Future; Expected date: 07/08/2022  -     Echo; Future  -     IR Tunneled Cath Placement With Port; Future; Expected date: 07/08/2022  -     Ambulatory referral/consult to Radiation Oncology; Future; Expected date: 07/15/2022  -     ondansetron (ZOFRAN-ODT) 8 MG TbDL; Take 1 tablet (8 mg total) by mouth every 12 (twelve) hours as needed.  Dispense: 90 tablet; Refill: 1  -     prochlorperazine (COMPAZINE) 5 MG tablet; Take 1 tablet (5 mg total) by mouth every 6 (six) hours as needed.  Dispense: 90 tablet; Refill: 1  -     axitinib (INLYTA) 5 mg Tab; Take 5 mg by mouth 2 (two) times daily.  Dispense: 120 tablet; Refill: 11  -     Ambulatory referral/consult to [facility] Palliative Care; Future; Expected date: 07/15/2022      Follow-Up: Follow up in about 2 weeks (around 7/22/2022).    KAITLIN BOGGS Md., Ph.D  Hematology & Oncology Department  Phone #: [phone]       A cycle is every 21 days:  ? Axitinib   (Inlyta)  5 mg flat dose orally twice daily on days 1 through 21  ? Pembrolizumab   (Keytruda)  200 mg flat dose IV once on day 1 for up to 35 cycles      1. Pembrolizumab (Keytruda) Prescribing Information. GINGER Estrella: [employer] Revised 06/2021. Accessed online 06/2021.   2. Mary Ellen BI, Alex ER, Renetta V, et al. Pembrolizumab plus Axitinib versus Sunitinib for Advanced Renal-Cell Carcinoma. N Engl J Med.  2019;380(90):3432-9107.

## 2022-07-08 NOTE — PLAN OF CARE
START ON PATHWAY REGIMEN - Renal Cell    [id number]        Axitinib (Inlyta)       Pembrolizumab (Keytruda)           Additional Orders: Axitinib dosing requires titration, see PI for   details. In KEYNOTE-426, pembrolizumab was administered for a maximum of 24   months, and axitinib was given until progression or unacceptable toxicity.   Serious immune-mediated adverse events can occur with pembrolizumab. Please   monitor your patient and refer to the linked immune-mediated adverse reaction   management materials for more information.    **Always confirm dose/schedule in your pharmacy ordering system**    Patient Characteristics:  Stage IV/Metastatic Disease, Clear Cell, First Line, Favorable Risk  Therapeutic Status: Stage IV/Metastatic Disease  Histology: Clear Cell  Line of Therapy: First Line  Risk Status: Favorable Risk  Intent of Therapy:  Non-Curative / Palliative Intent, Discussed with Patient

## 2022-07-08 NOTE — TELEPHONE ENCOUNTER
----- Message from Oksana Kulkarni sent at 7/8/2022 12:42 PM CDT -----  Regarding: Plavix  Contact: daughter  Calling regarding if pt needs to be back on Plavix.  Cheyenne Galvez [phone]

## 2022-07-08 NOTE — PROGRESS NOTES
Tumor Board Documentation      Loyda Montgomery was presented by Ghassan Lee MD at our Tumor Board on 7/8/2022, which included representatives from (P) Medical Oncology, Navigation, Hematology, Radiation Oncology, Surgical Oncology, Pathology, Genetics, Plastic Surgery, Radiology, Gastrointestinal, Pulmonology.    Loyda currently presents as (P) a current patient with (P) Other, with history of the following treatments: (P) None.    Additionally, we reviewed previous medical and familial history, history of present illness, and recent lab results along with all available histopathologic and imaging studies. The tumor board considered available treatment options and made the following recommendations:  (P) Chemotherapy, Radiation     Oncology recommends follow-up to discuss pt chemo/radiation options.    The following procedures/referrals were also placed: No orders of the defined types were placed in this encounter.      Clinical Trial Status: (P) Not discussed     National site-specific guidelines were discussed with respect to the case.    Tumor board is a meeting of clinicians from various specialty areas who evaluate and discuss patients for whom a multidisciplinary approach is being considered. Final determinations in the plan of care are those of the provider(s). The responsibility for follow up of recommendations given during tumor board is that of the provider.     Ghassan Lee MD

## 2022-07-11 ENCOUNTER — DOCUMENTATION ONLY (OUTPATIENT)
Dept: PALLIATIVE MEDICINE | Facility: CLINIC | Age: 67
End: 2022-07-11
Payer: MEDICARE

## 2022-07-11 NOTE — TELEPHONE ENCOUNTER
BI: COMPLETE     MEDICARE PLAN: Rx [employer] scripts  Estimated copay: $3168.04  Benefits Stage: Catastrophic  In Network: yes  PA approval on file: 06/08/2022 to 07/07/2025  LIS level: none     Forwarding to FA

## 2022-07-11 NOTE — PROGRESS NOTES
Palliative Referral     Nurse reached out to pt regarding her referral for palliative, her  stated we can discuss but would rather her start chemo first and all other visit coming then discuss palliative, he understands its scheduled for next month, to give them time to continue with other apt with new diagnosis. Pt's  understand the apt is in Aug, it can be r/s or moved up per request.

## 2022-07-12 ENCOUNTER — PATIENT MESSAGE (OUTPATIENT)
Dept: FAMILY MEDICINE | Facility: CLINIC | Age: 67
End: 2022-07-12
Payer: MEDICARE

## 2022-07-12 ENCOUNTER — TELEPHONE (OUTPATIENT)
Dept: RADIATION ONCOLOGY | Facility: CLINIC | Age: 67
End: 2022-07-12
Payer: MEDICARE

## 2022-07-12 ENCOUNTER — PATIENT MESSAGE (OUTPATIENT)
Dept: HEMATOLOGY/ONCOLOGY | Facility: CLINIC | Age: 67
End: 2022-07-12
Payer: MEDICARE

## 2022-07-12 ENCOUNTER — TELEPHONE (OUTPATIENT)
Dept: HEMATOLOGY/ONCOLOGY | Facility: CLINIC | Age: 67
End: 2022-07-12
Payer: MEDICARE

## 2022-07-12 NOTE — TELEPHONE ENCOUNTER
Outgoing call to patient regarding Inlyta prescription we received. Informed patients  Inlyta has been approved through insurance with a high copay and discussed FA options. John funding available currently. Patients  provided HH size and annual income and gave consent for [facility] to apply for john on patients behalf. [facility] will be back in touch once FA is complete.

## 2022-07-12 NOTE — TELEPHONE ENCOUNTER
Secured [employer] john for Inlyta and patient will pay $0 with john on file. Forwarding to assigned [facility] for initial consult.     FOR DOCUMENTATION ONLY:  Financial Assistance for Inlyta approved from 7/12/22 to 6/11/23  Source: [place]  BIN: [id number]  PCN: [id number]  Id: [id number]  GRP: [id number]  John Amount: $[id number]

## 2022-07-12 NOTE — TELEPHONE ENCOUNTER
"Received a call from pt caregiver- Kim  Stating, "a message was sent on behalf of pt, she is wanting to know if pt is needing radiation treatment or not. Pt saw provider 7/8/22 and was told no treatment was needed, but received a call from [facility] this morning wanting to get pt set up for treatment." Kim states "she is confused and basically wanting to know if radiation treatment is needed or not." Apologized to pt for the misunderstanding, informed pt that  is currently out of office, her message was received by his nurse and forwarded to NP. We are currently waiting on a response from Provider. Once we get a response we will reach out to her. Caregiver wanting a call back to her, Name: Kim  Contact [phone]  "

## 2022-07-12 NOTE — TELEPHONE ENCOUNTER
Returned call to schedule consult in rad-onc.  declined to make appt until it was clarified that radiation was needed because he stated he was told that they did not need radiation.        ----- Message from Enid Guzman MA sent at 7/12/2022 10:13 AM CDT -----  Contact: self    ----- Message -----  From: Quyen Sanches  Sent: 7/12/2022  10:12 AM CDT  To: Rosa GUTIERREZ Staff    .Type:  Patient Returning Call    Who Called:.Loyda Montgomery  Who Left Message for Patient:  Does the patient know what this is regarding?:apt   Would the patient rather a call back or a response via [facility]? Call back   Best Call Back Number:.[phone] or [phone]  Additional Information: pt states she missed a call

## 2022-07-12 NOTE — NURSING
1129am: Outgoing call to pt regarding NP setup per Dr. Lee. Spoke with pt , Supriya. Introduced myself and role in pt care. Discussed appts needed prior to starting chemo. Clarified [facility] consult appt purpose. Notified [facility] nurse that appt can now be scheduled with pt. Pt scheduled for ECHO on  at 1045 am at [place] and for chemo teaching on  at 1 pm at . Msg sent to IR nurseUrvashi requesting mediport placement. Pt  aware. Pt  encouraged to contact me directly for any further questions and/or concerns. Pt  given my direct contact info. Pt verbalized understanding. Pt plan to report to appts as scheduled.  Oncology Navigation   Intake  Cancer Type:   Internal / External Referral: Internal  Referral Source: Dr. Ximena Rosario  Date of Referral: 2022  Initial Nurse Navigator Contact: 2022  Referral to Initial Contact Timeline (days): 1  Date Worked: 2022  First Appointment Available: 2022  Appointment Date: 2022  Schedule to Appointment Timeline (days): 7  First Available Date vs. Scheduled Date (days): 0     Treatment  Current Status: Active  Date Presented to Tumor Board: 2022       Medical Oncologist: Dr. Ghassan Lee  Immunotherapy: Planned  Immunotherapy Name: Keytruda  Oral Therapy: Planned  Oral Therapy Name: Axitinib    Radiation Oncologist: Dr. Nayely Rea    Procedures: Echo  Echo Schedule Date: 2022          Radiation Oncologist: Dr. Nayely Rea    Support Systems: Spouse/significant other     Acuity  Stage: 2  Systemic Treatment - predicted or initiated: More than one treatment modality concurrently (chemotherapy, radiation, etc.) (+2)  Treatment Tolerability: Has not started treatment yet/treatment fully completed and side effects resolved  ECO  Comorbidities in Medical History: 2   Needed: 0  Support: 0  Verbalizes the need for more education: 1  Navigation Acuity: 9     Follow Up  No follow-ups on file.

## 2022-07-13 ENCOUNTER — TELEPHONE (OUTPATIENT)
Dept: FAMILY MEDICINE | Facility: CLINIC | Age: 67
End: 2022-07-13
Payer: MEDICARE

## 2022-07-13 ENCOUNTER — SPECIALTY PHARMACY (OUTPATIENT)
Dept: PHARMACY | Facility: CLINIC | Age: 67
End: 2022-07-13
Payer: MEDICARE

## 2022-07-13 NOTE — TELEPHONE ENCOUNTER
Specialty Pharmacy - Initial Clinical Assessment    Specialty Medication Orders Linked to Encounter    Flowsheet Row Most Recent Value   Medication #1 axitinib (INLYTA) 5 mg Tab (Order#[id number], Rx#[id number])        Patient Diagnosis   C64.2 - Malignant neoplasm of left kidney    Subjective    Loyda Montgomery is a 67 y.o. female, who is followed by the specialty pharmacy service for management and education.    Recent Encounters     Date Type Provider Description    07/13/2022 Specialty Pharmacy Taylor Molina, Sue Initial Clinical Assessment    07/08/2022 Specialty Pharmacy Douglas Ansari, Sue Referral Authorization        Clinical call attempts since last clinical assessment   No call attempts found.     Current Outpatient Medications   Medication Sig    amLODIPine (NORVASC) 10 MG tablet Take 1 tablet (10 mg total) by mouth once daily.    aspirin (ECOTRIN) 81 MG EC tablet Take 1 tablet (81 mg total) by mouth once daily. (Patient not taking: Reported on 7/8/2022)    atorvastatin (LIPITOR) 80 MG tablet Take 1 tablet (80 mg total) by mouth once daily.    axitinib (INLYTA) 5 mg Tab Take 1 tablet (5 mg) by mouth 2 (two) times daily.    carvediloL (COREG) 6.25 MG tablet TAKE 1 TABLET BY MOUTH TWICE DAILY WITH MEALS    cetirizine (ZYRTEC) 10 MG tablet Take 1 tablet (10 mg total) by mouth once daily.    clopidogreL (PLAVIX) 75 mg tablet Take 1 tablet (75 mg total) by mouth once daily. (Patient not taking: Reported on 7/8/2022)    fluticasone propionate (FLONASE) 50 mcg/actuation nasal spray Use 2 sprays to each nostril daily    hydroCHLOROthiazide (HYDRODIURIL) 25 MG tablet Take 1 tablet (25 mg total) by mouth once daily.    ondansetron (ZOFRAN-ODT) 8 MG TbDL Take 1 tablet (8 mg total) by mouth every 12 (twelve) hours as needed.    potassium chloride SA (K-DUR,KLOR-CON) 20 MEQ tablet Take 1 tablet (20 mEq total) by mouth once daily.    prochlorperazine (COMPAZINE) 5 MG tablet Take 1 tablet (5  mg total) by mouth every 6 (six) hours as needed.   Last reviewed on 7/8/2022 11:15 AM by Enid Guzman MA    Review of patient's allergies indicates:  No Known AllergiesLast reviewed on  7/8/2022 1:26 PM by Velvet Willett    Drug Interactions    Drug interactions evaluated: yes  Clinically relevant drug interactions identified: no  Provided the patient with educational material regarding drug interactions: not applicable         Adverse Effects    *All other systems reviewed and are negative       Assessment Questions - Documented Responses    Flowsheet Row Most Recent Value   Assessment    Medication Reconciliation completed for patient Yes   During the past 4 weeks, has patient missed any activities due to condition or medication? No   During the past 4 weeks, did patient have any of the following urgent care visits? None   Goals of Therapy Status Discussed (new start)   Status of the patients ability to self-administer: Is Able   All education points have been covered with patient? Yes, supplemental printed education provided   Welcome packet contents reviewed and discussed with patient? Yes   Assesment completed? Yes   Plan Therapy being initiated   Do you need to open a clinical intervention (i-vent)? No   Do you want to schedule first shipment? Yes   Medication #1 Assessment Info    Patient status New medication, New to [facility]   Is this medication appropriate for the patient? Yes   Is this medication effective? Yes        Refill Questions - Documented Responses    Flowsheet Row Most Recent Value   Patient Availability and HIPAA Verification    Does patient want to proceed with activity? Yes   HIPAA/medical authority confirmed? Yes   Relationship to patient of person spoken to? Self   Refill Screening Questions    When does the patient need to receive the medication? 07/14/22   Refill Delivery Questions    How will the patient receive the medication? Delivery Kaci   When does the patient need to receive the  "medication? 07/14/22   Shipping Address Home   Address in [facility] confirmed and updated if neccessary? Yes   Expected Copay ($) 0   Is the patient able to afford the medication copay? Yes   Payment Method zero copay   Days supply of Refill 30   Supplies needed? No supplies needed   Refill activity completed? Yes   Refill activity plan Refill scheduled   Shipment/Pickup Date: 07/13/22          Objective    She has a past medical history of Hyperlipidemia, Hypertension, and Stroke.    Tried/failed medications: taking with pembrolizumab     BP Readings from Last 4 Encounters:   07/08/22 (!) 143/73   07/07/22 (!) 144/56   06/30/22 (!) 146/67   06/15/22 (!) 142/58     Ht Readings from Last 4 Encounters:   07/08/22 5' 6" (1.676 m)   07/07/22 5' 6" (1.676 m)   06/30/22 5' 6" (1.676 m)   06/15/22 5' 6" (1.676 m)     Wt Readings from Last 4 Encounters:   07/08/22 80.1 kg (176 lb 9.4 oz)   07/07/22 81.6 kg (180 lb)   06/30/22 81.6 kg (180 lb)   06/15/22 82.1 kg (181 lb)     Recent Labs   Lab Result Units 07/07/22  1255 06/15/22  1205 05/26/22  1143 05/26/22  1143 05/12/22  1128   RBC M/uL 5.56 H 5.20  --  4.92 5.20   Hemoglobin g/dL 14.3 13.3  --  12.8 13.4   Hematocrit % 47.6 44.3  --  41.8 45.6   WBC K/uL 10.71 11.11  --  14.30 H 11.25   Gran # (ANC) K/uL 7.5 8.3 H   < > 11.8 H  --    Gran % % 70.3 74.4 H  --  82.3 H  --    Platelets K/uL 355 312  --  483 H 421   Sodium mmol/L 141 141  --  138 141   Potassium mmol/L 3.6 4.4  --  3.8 4.2   Chloride mmol/L 102 102  --  103 102   Glucose mg/dL 101 124 H  --  140 H 129 H   BUN mg/dL 26 H 21  --  15 20   Creatinine mg/dL 1.1 1.0  --  1.0 1.0   Calcium mg/dL 12.1 HH 11.2 H  --  11.1 H 10.7 H   Total Protein g/dL 7.5 7.0  --  7.6 7.4   Albumin g/dL 3.3 L 3.4 L  --  2.9 L 3.4 L   Total Bilirubin mg/dL 0.8 0.8  --  0.9 0.7   Alkaline Phosphatase U/L 129 118  --  108 113   AST U/L 38 30  --  21 26   ALT U/L 10 13  --  9 L 6 L    < > = values in this interval not displayed. "     The goals of cancer treatment include:  · Achieving remission of cancer, if possible  · Reducing tumor size and spread of cancer, if remission is not possible  · Minimizing pain and symptoms of the cancer  · Preventing infection and other complications of treatment  · Promoting adequate nutrition  · Encouraging proper hydration  · Improving or maintaining quality of life  · Maintaining optimal therapy adherence  · Minimizing and managing side effects    Goals of Therapy Status: Discussed (new start)    Assessment/Plan  Patient plans to start therapy on 07/14/22      Indication, dosage, appropriateness, effectiveness, safety and convenience of her specialty medication(s) were reviewed today.     Patient Education   Patient received education on the following:    Expectations and possible outcomes of therapy   Proper use, timely administration, and missed dose management   Duration of therapy   Side effects, including prevention, minimization, and management   Contraindications and safety precautions   New or changed medications, including prescribe and over the counter medications and supplements   Reviews recommended vaccinations, as appropriate   Storage, safe handling, and disposal        Tasks added this encounter   8/6/2022 - Refill Call (Auto Added)  1/2/2023 - Clinical - Follow Up Assesement (180 day)   Tasks due within next 3 months   No tasks due.     Taylor Molina, PharmD  [facility]  [address]  [facility] [address]  Phone: [phone]  Fax: [phone]

## 2022-07-13 NOTE — TELEPHONE ENCOUNTER
----- Message from Yazan Esparza sent at 7/13/2022  8:17 AM CDT -----  Contact: Epps/  Epps would like a call back at [phone] in regards to getting patient medications reviewed due to her recent diagnosis.

## 2022-07-14 ENCOUNTER — TELEPHONE (OUTPATIENT)
Dept: HEMATOLOGY/ONCOLOGY | Facility: CLINIC | Age: 67
End: 2022-07-14
Payer: MEDICARE

## 2022-07-14 NOTE — TELEPHONE ENCOUNTER
Patient's  states she received teaching from the pharmacy on Inlyta and received the medication this morning. She took one dose today. Advised them not to take anymore as she has not received chemotherapy education regarding the entire treatment regimen yet; this is scheduled for 7/19/2022. Patient's  is frustrated that there is delay in treatment for heart function; he is aware that Echo has to be done before starting treatment as she will need surgery for mediport placement. Advised him that Echo is scheduled for 7/19/2022 prior to chemotherapy education. Also discussed the plan for XRT; per Tumor Board notes, the recommendation is chemoXRT, which is why Dr. Lee referred her to Rad Onc for consultation. Patient's  states he is not sure he is interested in that. Will schedule a follow up with Dr. Lee for next week per his note to review Echo results, answer questions regarding treatment plans/recommendations, and start treatment.

## 2022-07-15 ENCOUNTER — TELEPHONE (OUTPATIENT)
Dept: RADIOLOGY | Facility: HOSPITAL | Age: 67
End: 2022-07-15
Payer: MEDICARE

## 2022-07-15 NOTE — TELEPHONE ENCOUNTER
Interventional Radiology:    Spoke with Mr. Epps, pts .  Scheduled Mediport placement for Friday 7/22 at 1:30pm.  Has been told that Plavix needs to be held for 5 days prior to procedure, nothing to eat or drink after 4am the morning of, arrival time needs to be Noon and Mr. Epps stated that he will be here to drive her home once discharged.

## 2022-07-18 ENCOUNTER — LAB VISIT (OUTPATIENT)
Dept: LAB | Facility: HOSPITAL | Age: 67
End: 2022-07-18
Attending: INTERNAL MEDICINE
Payer: MEDICARE

## 2022-07-18 ENCOUNTER — OFFICE VISIT (OUTPATIENT)
Dept: FAMILY MEDICINE | Facility: CLINIC | Age: 67
End: 2022-07-18
Payer: MEDICARE

## 2022-07-18 ENCOUNTER — TELEPHONE (OUTPATIENT)
Dept: RADIATION ONCOLOGY | Facility: CLINIC | Age: 67
End: 2022-07-18
Payer: MEDICARE

## 2022-07-18 VITALS
HEIGHT: 66 IN | WEIGHT: 171 LBS | DIASTOLIC BLOOD PRESSURE: 60 MMHG | SYSTOLIC BLOOD PRESSURE: 105 MMHG | HEART RATE: 60 BPM | BODY MASS INDEX: 27.48 KG/M2 | TEMPERATURE: 98 F

## 2022-07-18 DIAGNOSIS — R53.81 PHYSICAL DEBILITY: ICD-10-CM

## 2022-07-18 DIAGNOSIS — C64.2 MALIGNANT NEOPLASM OF LEFT KIDNEY: ICD-10-CM

## 2022-07-18 DIAGNOSIS — C64.2 MALIGNANT NEOPLASM OF LEFT KIDNEY: Primary | ICD-10-CM

## 2022-07-18 DIAGNOSIS — I49.9 IRREGULAR HEART RHYTHM: ICD-10-CM

## 2022-07-18 DIAGNOSIS — E83.52 HYPERCALCEMIA: ICD-10-CM

## 2022-07-18 LAB
ALBUMIN SERPL BCP-MCNC: 2.9 G/DL (ref 3.5–5.2)
ALP SERPL-CCNC: 108 U/L (ref 55–135)
ALT SERPL W/O P-5'-P-CCNC: 7 U/L (ref 10–44)
ANION GAP SERPL CALC-SCNC: 11 MMOL/L (ref 8–16)
AST SERPL-CCNC: 42 U/L (ref 10–40)
BASOPHILS # BLD AUTO: 0.1 K/UL (ref 0–0.2)
BASOPHILS NFR BLD: 0.9 % (ref 0–1.9)
BILIRUB SERPL-MCNC: 1.1 MG/DL (ref 0.1–1)
BUN SERPL-MCNC: 27 MG/DL (ref 8–23)
CALCIUM SERPL-MCNC: 12.4 MG/DL (ref 8.7–10.5)
CHLORIDE SERPL-SCNC: 100 MMOL/L (ref 95–110)
CO2 SERPL-SCNC: 26 MMOL/L (ref 23–29)
CREAT SERPL-MCNC: 1.1 MG/DL (ref 0.5–1.4)
DIFFERENTIAL METHOD: ABNORMAL
EOSINOPHIL # BLD AUTO: 0.8 K/UL (ref 0–0.5)
EOSINOPHIL NFR BLD: 6.9 % (ref 0–8)
ERYTHROCYTE [DISTWIDTH] IN BLOOD BY AUTOMATED COUNT: 19.3 % (ref 11.5–14.5)
EST. GFR  (AFRICAN AMERICAN): 59.2 ML/MIN/1.73 M^2
EST. GFR  (NON AFRICAN AMERICAN): 51.3 ML/MIN/1.73 M^2
GLUCOSE SERPL-MCNC: 149 MG/DL (ref 70–110)
HCT VFR BLD AUTO: 46.8 % (ref 37–48.5)
HGB BLD-MCNC: 14 G/DL (ref 12–16)
IMM GRANULOCYTES # BLD AUTO: 0.08 K/UL (ref 0–0.04)
IMM GRANULOCYTES NFR BLD AUTO: 0.7 % (ref 0–0.5)
LYMPHOCYTES # BLD AUTO: 1.1 K/UL (ref 1–4.8)
LYMPHOCYTES NFR BLD: 9.6 % (ref 18–48)
MCH RBC QN AUTO: 25.1 PG (ref 27–31)
MCHC RBC AUTO-ENTMCNC: 29.9 G/DL (ref 32–36)
MCV RBC AUTO: 84 FL (ref 82–98)
MONOCYTES # BLD AUTO: 0.8 K/UL (ref 0.3–1)
MONOCYTES NFR BLD: 6.8 % (ref 4–15)
NEUTROPHILS # BLD AUTO: 8.7 K/UL (ref 1.8–7.7)
NEUTROPHILS NFR BLD: 75.8 % (ref 38–73)
NRBC BLD-RTO: 0 /100 WBC
PLATELET # BLD AUTO: 402 K/UL (ref 150–450)
PMV BLD AUTO: 9.2 FL (ref 9.2–12.9)
POTASSIUM SERPL-SCNC: 3.4 MMOL/L (ref 3.5–5.1)
PROT SERPL-MCNC: 7.1 G/DL (ref 6–8.4)
RBC # BLD AUTO: 5.58 M/UL (ref 4–5.4)
SODIUM SERPL-SCNC: 137 MMOL/L (ref 136–145)
WBC # BLD AUTO: 11.53 K/UL (ref 3.9–12.7)

## 2022-07-18 PROCEDURE — 1126F AMNT PAIN NOTED NONE PRSNT: CPT | Mod: CPTII,S$GLB,, | Performed by: INTERNAL MEDICINE

## 2022-07-18 PROCEDURE — 99214 PR OFFICE/OUTPT VISIT, EST, LEVL IV, 30-39 MIN: ICD-10-PCS | Mod: S$GLB,,, | Performed by: INTERNAL MEDICINE

## 2022-07-18 PROCEDURE — 3074F SYST BP LT 130 MM HG: CPT | Mod: CPTII,S$GLB,, | Performed by: INTERNAL MEDICINE

## 2022-07-18 PROCEDURE — 3078F DIAST BP <80 MM HG: CPT | Mod: CPTII,S$GLB,, | Performed by: INTERNAL MEDICINE

## 2022-07-18 PROCEDURE — 36415 COLL VENOUS BLD VENIPUNCTURE: CPT | Mod: PO | Performed by: INTERNAL MEDICINE

## 2022-07-18 PROCEDURE — 1126F PR PAIN SEVERITY QUANTIFIED, NO PAIN PRESENT: ICD-10-PCS | Mod: CPTII,S$GLB,, | Performed by: INTERNAL MEDICINE

## 2022-07-18 PROCEDURE — 99999 PR PBB SHADOW E&M-EST. PATIENT-LVL IV: CPT | Mod: PBBFAC,,, | Performed by: INTERNAL MEDICINE

## 2022-07-18 PROCEDURE — 99214 OFFICE O/P EST MOD 30 MIN: CPT | Mod: S$GLB,,, | Performed by: INTERNAL MEDICINE

## 2022-07-18 PROCEDURE — 85025 COMPLETE CBC W/AUTO DIFF WBC: CPT | Mod: PO | Performed by: INTERNAL MEDICINE

## 2022-07-18 PROCEDURE — 1101F PR PT FALLS ASSESS DOC 0-1 FALLS W/OUT INJ PAST YR: ICD-10-PCS | Mod: CPTII,S$GLB,, | Performed by: INTERNAL MEDICINE

## 2022-07-18 PROCEDURE — 3078F PR MOST RECENT DIASTOLIC BLOOD PRESSURE < 80 MM HG: ICD-10-PCS | Mod: CPTII,S$GLB,, | Performed by: INTERNAL MEDICINE

## 2022-07-18 PROCEDURE — 3074F PR MOST RECENT SYSTOLIC BLOOD PRESSURE < 130 MM HG: ICD-10-PCS | Mod: CPTII,S$GLB,, | Performed by: INTERNAL MEDICINE

## 2022-07-18 PROCEDURE — 3008F PR BODY MASS INDEX (BMI) DOCUMENTED: ICD-10-PCS | Mod: CPTII,S$GLB,, | Performed by: INTERNAL MEDICINE

## 2022-07-18 PROCEDURE — 1101F PT FALLS ASSESS-DOCD LE1/YR: CPT | Mod: CPTII,S$GLB,, | Performed by: INTERNAL MEDICINE

## 2022-07-18 PROCEDURE — 99999 PR PBB SHADOW E&M-EST. PATIENT-LVL IV: ICD-10-PCS | Mod: PBBFAC,,, | Performed by: INTERNAL MEDICINE

## 2022-07-18 PROCEDURE — 3008F BODY MASS INDEX DOCD: CPT | Mod: CPTII,S$GLB,, | Performed by: INTERNAL MEDICINE

## 2022-07-18 PROCEDURE — 3044F PR MOST RECENT HEMOGLOBIN A1C LEVEL <7.0%: ICD-10-PCS | Mod: CPTII,S$GLB,, | Performed by: INTERNAL MEDICINE

## 2022-07-18 PROCEDURE — 3044F HG A1C LEVEL LT 7.0%: CPT | Mod: CPTII,S$GLB,, | Performed by: INTERNAL MEDICINE

## 2022-07-18 PROCEDURE — 80053 COMPREHEN METABOLIC PANEL: CPT | Performed by: INTERNAL MEDICINE

## 2022-07-18 PROCEDURE — 3288F PR FALLS RISK ASSESSMENT DOCUMENTED: ICD-10-PCS | Mod: CPTII,S$GLB,, | Performed by: INTERNAL MEDICINE

## 2022-07-18 PROCEDURE — 3288F FALL RISK ASSESSMENT DOCD: CPT | Mod: CPTII,S$GLB,, | Performed by: INTERNAL MEDICINE

## 2022-07-18 NOTE — PATIENT INSTRUCTIONS
-can consider stopping cetirizine (zyrtec; for allergies) to see if this will help with dry mouth.  -MAKE SURE YOU ARE NOT TAKING ANY CALCIUM SUPPLEMENTS. CHECK BOTTLES AT HOME.

## 2022-07-18 NOTE — TELEPHONE ENCOUNTER
Spoke with pt and spouse and they decided to wait until radiation is needed to make an appt.      ----- Message from Nayely Rea III, MD sent at 7/18/2022  7:38 AM CDT -----  Regarding: RE: verifying radaition is needed?  I don't think she needs radiation at this moment - may need in future    It's up to her/family whether she wants to see me now or later    ----- Message -----  From: Ghassan Lee MD  Sent: 7/15/2022   6:05 AM CDT  To: Nayely Rea III, MD, #  Subject: RE: verifying radaition is needed?               Was her appointment to schedule RT? I wanted her to establish with Dr. Rea in case future palliative RT is warranted. Dr. Rea does not want to radiate at this time.  ----- Message -----  From: Melissa Bryant RN  Sent: 7/12/2022  10:36 AM CDT  To: Ghassan Lee MD  Subject: verifying radaition is needed?                   I called patient to schedule radiation consult but the  asked me to verify with you that it was needed because he said he was told that there would be no radiation done. He did not want to make an appt until that was clarified.

## 2022-07-18 NOTE — PROGRESS NOTES
Assessment/Plan:    Problem List Items Addressed This Visit        Oncology    Malignant neoplasm of left kidney - Primary  -recent diagnosis of stage IV RCC with metastasis (lungs, bone)  -followed by oncology with plan to start treatment with Inlyta/Keytruda soon  -has appt with palliative soon but having worsening weakness and difficulty getting to restroom at night; will obtain bed side commode for patient; her and  with no other needs/requests at this time    Relevant Orders    COMMODE FOR HOME USE      Other Visit Diagnoses     Physical debility        Relevant Orders    COMMODE FOR HOME USE    Irregular heart rhythm      -noted irregular rhythm in clinic, agnieszka neely  -asymptomatic  -obtain EKG    Relevant Orders    IN OFFICE EKG 12-LEAD (to Muse) (Completed)    Hypercalcemia      -elevated calcium on recent labs, critical range  -instructed to go to ER after last labs but patient did not (waiting in ER and then left)  -patient educated on dangers of hypercalcemia  -stop any home calcium supplements  -repeat labs  -patient and  agree to go to the ER if still elevated            Ximena Rosario MD  _____________________________________________________________________________________________________________________________________________________    CC: follow up    HPI:    Patient is in clinic today as an established patient here for follow up.    Since last visit, patient has been diagnosed with metastatic RCC. Discovered during routine follow up last visit but with complaint of unintentional weight loss and chronic cough. CXR revealing multiple pulmonary nodules, concerning for malignancy. CT abd/pelvis showing large L renal mass. Referred to oncology. PET scan again showing pulmonary mets and likely bony mets. Plan for treatment with Inlyta and Keytruda. Plans to start soon.     Patient reports progressive weakness since last visit. Reports feeling weak and decreased appetite. Having  difficulty trying to get to the bathroom at night. Oncology has arranged for palliative consult, however patient requested deferral of appt until after she starts on medication. We discussed at least arranging for a bed side commode for now. They do not need any other equipment at this time.    Of note, per chart check, patient found to be hypercalcemic on recent labs. She went to the ER but was made to wait for hours so then she left. Labs have not been done since that time.     Past Medical History:  Past Medical History:   Diagnosis Date    Hyperlipidemia     Hypertension     Stroke      Past Surgical History:   Procedure Laterality Date    BRONCHOSCOPY Bilateral 6/6/2022    Procedure: Bronchoscopy - insert lighted tube into airway to take a biopsy of lung;  Surgeon: Michi Ceron MD;  Location: [facility] ENDO;  Service: Endoscopy;  Laterality: Bilateral;    ENDOBRONCHIAL ULTRASOUND Bilateral 6/6/2022    Procedure: ENDOBRONCHIAL ULTRASOUND (EBUS);  Surgeon: Michi Ceron MD;  Location: [facility] ENDO;  Service: Endoscopy;  Laterality: Bilateral;    FLUOROSCOPY N/A 7/22/2022    Procedure: FLUOROSCOPY/Mediport placement;  Surgeon: Sara Delarosa MD;  Location: [facility] CATH LAB;  Service: General;  Laterality: N/A;    TONSILLECTOMY       Review of patient's allergies indicates:  No Known Allergies  Social History     Tobacco Use    Smoking status: Never Smoker    Smokeless tobacco: Never Used   Substance Use Topics    Alcohol use: No     Alcohol/week: 0.0 standard drinks    Drug use: No     Family History   Problem Relation Age of Onset    Stroke Mother     Stroke Father     Stroke Sister      Current Outpatient Medications on File Prior to Visit   Medication Sig Dispense Refill    amLODIPine (NORVASC) 10 MG tablet Take 1 tablet (10 mg total) by mouth once daily. 90 tablet 1    aspirin (ECOTRIN) 81 MG EC tablet Take 1 tablet (81 mg total) by mouth once daily. (Patient not taking: Reported on  7/21/2022)      atorvastatin (LIPITOR) 80 MG tablet Take 1 tablet (80 mg total) by mouth once daily. 90 tablet 1    axitinib (INLYTA) 5 mg Tab Take 1 tablet (5 mg) by mouth 2 (two) times daily. 120 tablet 11    carvediloL (COREG) 6.25 MG tablet TAKE 1 TABLET BY MOUTH TWICE DAILY WITH MEALS 180 tablet 0    cetirizine (ZYRTEC) 10 MG tablet Take 1 tablet (10 mg total) by mouth once daily. 30 tablet 11    clopidogreL (PLAVIX) 75 mg tablet Take 1 tablet (75 mg total) by mouth once daily. (Patient not taking: Reported on 7/21/2022) 90 tablet 1    fluticasone propionate (FLONASE) 50 mcg/actuation nasal spray Use 2 sprays to each nostril daily 16 g 0    hydroCHLOROthiazide (HYDRODIURIL) 25 MG tablet Take 1 tablet (25 mg total) by mouth once daily. 90 tablet 1    ondansetron (ZOFRAN-ODT) 8 MG TbDL Take 1 tablet (8 mg total) by mouth every 12 (twelve) hours as needed. 90 tablet 1    potassium chloride SA (K-DUR,KLOR-CON) 20 MEQ tablet Take 1 tablet (20 mEq total) by mouth once daily. 90 tablet 1    prochlorperazine (COMPAZINE) 5 MG tablet Take 1 tablet (5 mg total) by mouth every 6 (six) hours as needed. 90 tablet 1     No current facility-administered medications on file prior to visit.       Review of Systems   Constitutional: Positive for fatigue and unexpected weight change. Negative for chills, diaphoresis and fever.   HENT: Negative for congestion, ear pain, postnasal drip, sinus pain and sore throat.    Eyes: Negative for pain and redness.   Respiratory: Negative for cough, chest tightness and shortness of breath.    Cardiovascular: Negative for chest pain and leg swelling.   Gastrointestinal: Negative for abdominal pain, constipation, diarrhea, nausea and vomiting.   Genitourinary: Negative for dysuria and hematuria.   Musculoskeletal: Negative for arthralgias and joint swelling.   Skin: Negative for rash.   Neurological: Negative for dizziness, syncope and headaches.       Vitals:    07/18/22 0737   BP:  "105/60   Pulse: 60   Temp: 98.2 °F (36.8 °C)   Weight: 77.6 kg (171 lb)   Height: 5' 6" (1.676 m)       Wt Readings from Last 3 Encounters:   07/22/22 79.4 kg (175 lb 1.1 oz)   07/21/22 79.4 kg (175 lb 0.7 oz)   07/19/22 79.8 kg (176 lb)       Physical Exam  Constitutional:       General: She is not in acute distress.     Appearance: She is well-developed. She is ill-appearing.      Comments: In wheelchair   HENT:      Head: Normocephalic and atraumatic.   Eyes:      Conjunctiva/sclera: Conjunctivae normal.   Cardiovascular:      Rate and Rhythm: Normal rate. Rhythm irregular.      Pulses: Normal pulses.      Heart sounds: Normal heart sounds. No murmur heard.  Pulmonary:      Effort: Pulmonary effort is normal. No respiratory distress.      Breath sounds: Normal breath sounds.   Abdominal:      General: Bowel sounds are normal. There is no distension.      Palpations: Abdomen is soft.      Tenderness: There is no abdominal tenderness.   Musculoskeletal:         General: Normal range of motion.      Cervical back: Normal range of motion and neck supple.   Skin:     General: Skin is warm and dry.      Findings: No rash.   Neurological:      General: No focal deficit present.      Mental Status: She is alert and oriented to person, place, and time.         Health Maintenance   Topic Date Due    DEXA Scan  08/20/2022    Mammogram  05/16/2023    High Dose Statin  07/22/2023    Lipid Panel  05/12/2027    TETANUS VACCINE  03/13/2028    Hepatitis C Screening  Completed       "

## 2022-07-19 ENCOUNTER — CLINICAL SUPPORT (OUTPATIENT)
Dept: HEMATOLOGY/ONCOLOGY | Facility: CLINIC | Age: 67
End: 2022-07-19
Payer: MEDICARE

## 2022-07-19 ENCOUNTER — HOSPITAL ENCOUNTER (OUTPATIENT)
Dept: CARDIOLOGY | Facility: HOSPITAL | Age: 67
Discharge: HOME | End: 2022-07-19
Attending: INTERNAL MEDICINE
Payer: MEDICARE

## 2022-07-19 ENCOUNTER — TELEPHONE (OUTPATIENT)
Dept: HEMATOLOGY/ONCOLOGY | Facility: CLINIC | Age: 67
End: 2022-07-19
Payer: MEDICARE

## 2022-07-19 ENCOUNTER — DOCUMENTATION ONLY (OUTPATIENT)
Dept: HEMATOLOGY/ONCOLOGY | Facility: CLINIC | Age: 67
End: 2022-07-19
Payer: MEDICARE

## 2022-07-19 VITALS — BODY MASS INDEX: 28.28 KG/M2 | HEIGHT: 66 IN | WEIGHT: 176 LBS

## 2022-07-19 DIAGNOSIS — C64.2 MALIGNANT NEOPLASM OF LEFT KIDNEY: ICD-10-CM

## 2022-07-19 DIAGNOSIS — C64.2 MALIGNANT NEOPLASM OF LEFT KIDNEY: Primary | ICD-10-CM

## 2022-07-19 DIAGNOSIS — R73.03 PREDIABETES: ICD-10-CM

## 2022-07-19 DIAGNOSIS — I49.9 IRREGULAR HEART RHYTHM: ICD-10-CM

## 2022-07-19 PROBLEM — R42 DIZZINESS: Status: ACTIVE | Noted: 2021-08-06

## 2022-07-19 PROBLEM — E78.5 HYPERLIPIDEMIA: Status: ACTIVE | Noted: 2022-07-19

## 2022-07-19 PROBLEM — I10 HYPERTENSION: Status: ACTIVE | Noted: 2022-07-19

## 2022-07-19 PROBLEM — I63.9 CEREBROVASCULAR ACCIDENT (CVA): Status: ACTIVE | Noted: 2022-07-19

## 2022-07-19 PROBLEM — Z20.822 CONTACT WITH AND (SUSPECTED) EXPOSURE TO COVID-19: Status: ACTIVE | Noted: 2021-08-09

## 2022-07-19 LAB
AV INDEX (PROSTH): 0.68
AV MEAN GRADIENT: 12 MMHG
AV PEAK GRADIENT: 21 MMHG
AV VALVE AREA: 2.12 CM2
AV VELOCITY RATIO: 0.71
BSA FOR ECHO PROCEDURE: 1.93 M2
CV ECHO LV RWT: 0.39 CM
DOP CALC AO PEAK VEL: 2.31 M/S
DOP CALC AO VTI: 48.2 CM
DOP CALC LVOT AREA: 3.1 CM2
DOP CALC LVOT DIAMETER: 1.99 CM
DOP CALC LVOT PEAK VEL: 1.64 M/S
DOP CALC LVOT STROKE VOLUME: 102.28 CM3
DOP CALC RVOT PEAK VEL: 0.98 M/S
DOP CALC RVOT VTI: 23 CM
DOP CALCLVOT PEAK VEL VTI: 32.9 CM
E WAVE DECELERATION TIME: 374.14 MSEC
E/A RATIO: 0.94
E/E' RATIO: 14.22 M/S
ECHO LV POSTERIOR WALL: 0.97 CM (ref 0.6–1.1)
EJECTION FRACTION: 65 %
FRACTIONAL SHORTENING: 37 % (ref 28–44)
INTERVENTRICULAR SEPTUM: 0.95 CM (ref 0.6–1.1)
IVRT: 59.94 MSEC
LA MAJOR: 5.33 CM
LA MINOR: 4.8 CM
LA WIDTH: 4 CM
LEFT ATRIUM SIZE: 4.31 CM
LEFT ATRIUM VOLUME INDEX MOD: 31 ML/M2
LEFT ATRIUM VOLUME INDEX: 39.2 ML/M2
LEFT ATRIUM VOLUME MOD: 58.55 CM3
LEFT ATRIUM VOLUME: 74.02 CM3
LEFT INTERNAL DIMENSION IN SYSTOLE: 3.15 CM (ref 2.1–4)
LEFT VENTRICLE DIASTOLIC VOLUME INDEX: 61.87 ML/M2
LEFT VENTRICLE DIASTOLIC VOLUME: 116.93 ML
LEFT VENTRICLE MASS INDEX: 91 G/M2
LEFT VENTRICLE SYSTOLIC VOLUME INDEX: 20.9 ML/M2
LEFT VENTRICLE SYSTOLIC VOLUME: 39.42 ML
LEFT VENTRICULAR INTERNAL DIMENSION IN DIASTOLE: 4.98 CM (ref 3.5–6)
LEFT VENTRICULAR MASS: 171.16 G
LV LATERAL E/E' RATIO: 12.8 M/S
LV SEPTAL E/E' RATIO: 16 M/S
LVOT MG: 5.81 MMHG
LVOT MV: 1.13 CM/S
MV PEAK A VEL: 1.36 M/S
MV PEAK E VEL: 1.28 M/S
PISA TR MAX VEL: 2.19 M/S
PV MEAN GRADIENT: 2.18 MMHG
PV PEAK VELOCITY: 1.27 CM/S
QEF: 60 %
RA MAJOR: 4.7 CM
RA PRESSURE: 8 MMHG
RA WIDTH: 3.19 CM
RIGHT VENTRICULAR END-DIASTOLIC DIMENSION: 4 CM
SINUS: 2.86 CM
STJ: 2.57 CM
TDI LATERAL: 0.1 M/S
TDI SEPTAL: 0.08 M/S
TDI: 0.09 M/S
TR MAX PG: 19 MMHG
TRICUSPID ANNULAR PLANE SYSTOLIC EXCURSION: 1.87 CM
TV REST PULMONARY ARTERY PRESSURE: 27 MMHG

## 2022-07-19 PROCEDURE — 93356 ECHO (CUPID ONLY): ICD-10-PCS | Mod: ,,, | Performed by: STUDENT IN AN ORGANIZED HEALTH CARE EDUCATION/TRAINING PROGRAM

## 2022-07-19 PROCEDURE — 93005 ELECTROCARDIOGRAM TRACING: CPT

## 2022-07-19 PROCEDURE — 93306 TTE W/DOPPLER COMPLETE: CPT | Mod: 26,,, | Performed by: STUDENT IN AN ORGANIZED HEALTH CARE EDUCATION/TRAINING PROGRAM

## 2022-07-19 PROCEDURE — 93356 MYOCRD STRAIN IMG SPCKL TRCK: CPT

## 2022-07-19 PROCEDURE — 93010 ELECTROCARDIOGRAM REPORT: CPT | Mod: ,,, | Performed by: STUDENT IN AN ORGANIZED HEALTH CARE EDUCATION/TRAINING PROGRAM

## 2022-07-19 PROCEDURE — 93010 EKG 12-LEAD: ICD-10-PCS | Mod: ,,, | Performed by: STUDENT IN AN ORGANIZED HEALTH CARE EDUCATION/TRAINING PROGRAM

## 2022-07-19 PROCEDURE — 93356 MYOCRD STRAIN IMG SPCKL TRCK: CPT | Mod: ,,, | Performed by: STUDENT IN AN ORGANIZED HEALTH CARE EDUCATION/TRAINING PROGRAM

## 2022-07-19 PROCEDURE — 93306 ECHO (CUPID ONLY): ICD-10-PCS | Mod: 26,,, | Performed by: STUDENT IN AN ORGANIZED HEALTH CARE EDUCATION/TRAINING PROGRAM

## 2022-07-19 NOTE — TELEPHONE ENCOUNTER
----- Message from Ghassan Lee MD sent at 7/19/2022  3:10 PM CDT -----  She should not. Moreover, her calcium is high and I recommended going to ER earlier. Do you know if she did?   ----- Message -----  From: Tere Alvares RN  Sent: 7/19/2022   2:33 PM CDT  To: Charlotte Pastor McArdle, PharmD, #    Dr. Lee,  [facility] are asking if it is ok for her to take Selenium while on her oral and IV chemo.  I am just not familiar enough to tell her yes or  no.  Also, it looks like her plan has 21 day cycles.  They are saying they were told it would be 6 week cycles.  They just want clarification.  Thanks!!  Tere

## 2022-07-19 NOTE — TELEPHONE ENCOUNTER
----- Message from Ghassan Lee MD sent at 7/19/2022  6:50 AM CDT -----  High serum calcium. Recommend going to the ER immediately for treatment

## 2022-07-19 NOTE — TELEPHONE ENCOUNTER
Spoke to jesus informed him that Dr Lee wants him to go to the  ER he states they have had a busy day they will go.

## 2022-07-19 NOTE — NURSING
1430pm: Met with pt at end of chemo education alongside  to discuss chemo start dates. Pt scheduled for labs on  at 8 am at [place] and for Dr. Lee on  at 720 am at [place]. Pt informed that rapid COVID will be done prior to infusion per protocol for unvaccinated pts. Pt instructed to start oral Axitinib on  when start Keytruda. Pt and pt  verbalized understanding. Pt plan to report to appts as scheduled. SW and FC notified via msg pt chemo start date.   Oncology Navigation   Intake  Cancer Type:   Internal / External Referral: Internal  Referral Source: Dr. Ximena Rosario  Date of Referral: 2022  Initial Nurse Navigator Contact: 2022  Referral to Initial Contact Timeline (days): 1  Date Worked: 2022  First Appointment Available: 2022  Appointment Date: 2022  Schedule to Appointment Timeline (days): 7  First Available Date vs. Scheduled Date (days): 0  Multiple appointments: Yes  Start of Treatment: 2022     Treatment  Current Status: Active  Date Presented to Tumor Board: 2022       Medical Oncologist: Dr. Ghassan Lee  Immunotherapy: Planned  Immunotherapy Name: Keytruda  Oral Therapy: Planned  Oral Therapy Name: Axitinib    Radiation Oncologist: Dr. Nayely Rea    Procedures: Port / PICC  Echo Schedule Date: 2022  Port / PICC Schedule Date: 2022    General Referrals: Social work  Social Work: notified via ms  Social Work Referral Date: 2022       Radiation Oncologist: Dr. Nayely Rea    Support Systems: Spouse/significant other     Acuity  Stage: 2  Systemic Treatment - predicted or initiated: More than one treatment modality concurrently (chemotherapy, radiation, etc.) (+2)  Treatment Tolerability: Has not started treatment yet/treatment fully completed and side effects resolved  ECO  Comorbidities in Medical History: 2   Needed: 0  Support: 0  Verbalizes the need for more education: 1  Navigation Acuity: 9     Follow  Up  No follow-ups on file.

## 2022-07-19 NOTE — PROGRESS NOTES
Met with patient and  in person_and daughter via phone___) to discuss upcoming systemic therapy initiation. Discussed patient diagnosis including staging information. Also discussed that therapy regimen prescribed involves the following drugs: __Keytruda (Axitinib teaching by [facility])__, and timeline of therapy administration. Went over what to expect on first day of treatment, including what to bring with you, visitor policy, and infusion suite guidelines. Also discussed supportive and shared services available to patient, including social work, financial counseling, oncology nutrition, and oncology psychology.      Covered with patient potential side effects and symptom management. Reviewed supportive medications that will be given before, during, and after treatment and provided written instructions.  Also stressed that other side effects are possible outside of those listed. Spent additional time on signs of infection, infection prevention, and proper nutrition/hydration.    Education provided to patient via [facility] specific drug information and chemotherapy education binder___). Also provided contact information for clinic and information related to [facility] communication. Discussed communication process for after-hours needs and some common scenarios in which patient should call provider for guidance vs. immediately report to the emergency room.     Finally, patient was given my contact information. Encouraged patient to call with any questions, concerns, or needs. Patient verbalized understanding of all above information.

## 2022-07-20 ENCOUNTER — PATIENT MESSAGE (OUTPATIENT)
Dept: FAMILY MEDICINE | Facility: CLINIC | Age: 67
End: 2022-07-20
Payer: MEDICARE

## 2022-07-20 ENCOUNTER — PATIENT MESSAGE (OUTPATIENT)
Dept: HEMATOLOGY/ONCOLOGY | Facility: CLINIC | Age: 67
End: 2022-07-20
Payer: MEDICARE

## 2022-07-20 NOTE — ASSESSMENT & PLAN NOTE
Metastatic renal cell carcinoma. Reviewed pathology report from right lung biopsy. MRI of the brain revealed an area of enhancement in the left frontal bone extending into the soft tissue concerning for metastatic focus.    She understands that her disease is incurable but can be treated.     Regimen:   A cycle is every 21 days:  ? Axitinib   (Inlyta)  5 mg flat dose orally twice daily on days 1 through 21  ? Pembrolizumab   (Keytruda)  200 mg flat dose IV once on day 1 for up to 35 cycles (N Engl. J Med 2019;380(12):8258-6482)    2D-ECHO from 7/19/2022 showed EF of 60%.     Reviewed labs, no concerning cytopenia, will proceed with C1D1. RTC in 3 weeks with labs.

## 2022-07-21 ENCOUNTER — TELEPHONE (OUTPATIENT)
Dept: FAMILY MEDICINE | Facility: CLINIC | Age: 67
End: 2022-07-21
Payer: MEDICARE

## 2022-07-21 ENCOUNTER — OFFICE VISIT (OUTPATIENT)
Dept: HEMATOLOGY/ONCOLOGY | Facility: CLINIC | Age: 67
End: 2022-07-21
Payer: MEDICARE

## 2022-07-21 ENCOUNTER — INFUSION (OUTPATIENT)
Dept: INFUSION THERAPY | Facility: HOSPITAL | Age: 67
End: 2022-07-21
Attending: INTERNAL MEDICINE
Payer: MEDICARE

## 2022-07-21 ENCOUNTER — PATIENT MESSAGE (OUTPATIENT)
Dept: FAMILY MEDICINE | Facility: CLINIC | Age: 67
End: 2022-07-21
Payer: MEDICARE

## 2022-07-21 VITALS
BODY MASS INDEX: 28.14 KG/M2 | SYSTOLIC BLOOD PRESSURE: 114 MMHG | RESPIRATION RATE: 18 BRPM | DIASTOLIC BLOOD PRESSURE: 58 MMHG | HEIGHT: 66 IN | HEART RATE: 54 BPM | WEIGHT: 175.06 LBS

## 2022-07-21 VITALS
TEMPERATURE: 98 F | SYSTOLIC BLOOD PRESSURE: 141 MMHG | DIASTOLIC BLOOD PRESSURE: 58 MMHG | OXYGEN SATURATION: 94 % | HEART RATE: 68 BPM | RESPIRATION RATE: 16 BRPM

## 2022-07-21 DIAGNOSIS — R53.81 PHYSICAL DEBILITY: Primary | ICD-10-CM

## 2022-07-21 DIAGNOSIS — C64.2 MALIGNANT NEOPLASM OF LEFT KIDNEY: Primary | ICD-10-CM

## 2022-07-21 DIAGNOSIS — Z79.899 IMMUNODEFICIENCY DUE TO CHEMOTHERAPY: Primary | ICD-10-CM

## 2022-07-21 DIAGNOSIS — D84.821 IMMUNODEFICIENCY DUE TO CHEMOTHERAPY: Primary | ICD-10-CM

## 2022-07-21 DIAGNOSIS — E86.0 DEHYDRATION: ICD-10-CM

## 2022-07-21 DIAGNOSIS — C64.2 MALIGNANT NEOPLASM OF LEFT KIDNEY: ICD-10-CM

## 2022-07-21 DIAGNOSIS — T45.1X5A IMMUNODEFICIENCY DUE TO CHEMOTHERAPY: Primary | ICD-10-CM

## 2022-07-21 DIAGNOSIS — E86.0 DEHYDRATION: Primary | ICD-10-CM

## 2022-07-21 DIAGNOSIS — D68.9 COAGULATION DEFECT, UNSPECIFIED: ICD-10-CM

## 2022-07-21 PROCEDURE — 1101F PR PT FALLS ASSESS DOC 0-1 FALLS W/OUT INJ PAST YR: ICD-10-PCS | Mod: CPTII,S$GLB,, | Performed by: INTERNAL MEDICINE

## 2022-07-21 PROCEDURE — 3078F DIAST BP <80 MM HG: CPT | Mod: CPTII,S$GLB,, | Performed by: INTERNAL MEDICINE

## 2022-07-21 PROCEDURE — 96360 HYDRATION IV INFUSION INIT: CPT

## 2022-07-21 PROCEDURE — 1126F PR PAIN SEVERITY QUANTIFIED, NO PAIN PRESENT: ICD-10-PCS | Mod: CPTII,S$GLB,, | Performed by: INTERNAL MEDICINE

## 2022-07-21 PROCEDURE — 96361 HYDRATE IV INFUSION ADD-ON: CPT

## 2022-07-21 PROCEDURE — 25000003 PHARM REV CODE 250: Performed by: INTERNAL MEDICINE

## 2022-07-21 PROCEDURE — 3074F SYST BP LT 130 MM HG: CPT | Mod: CPTII,S$GLB,, | Performed by: INTERNAL MEDICINE

## 2022-07-21 PROCEDURE — 99215 PR OFFICE/OUTPT VISIT, EST, LEVL V, 40-54 MIN: ICD-10-PCS | Mod: 25,S$GLB,, | Performed by: INTERNAL MEDICINE

## 2022-07-21 PROCEDURE — 3074F PR MOST RECENT SYSTOLIC BLOOD PRESSURE < 130 MM HG: ICD-10-PCS | Mod: CPTII,S$GLB,, | Performed by: INTERNAL MEDICINE

## 2022-07-21 PROCEDURE — 1126F AMNT PAIN NOTED NONE PRSNT: CPT | Mod: CPTII,S$GLB,, | Performed by: INTERNAL MEDICINE

## 2022-07-21 PROCEDURE — 99999 PR PBB SHADOW E&M-EST. PATIENT-LVL III: CPT | Mod: PBBFAC,,, | Performed by: INTERNAL MEDICINE

## 2022-07-21 PROCEDURE — 3288F PR FALLS RISK ASSESSMENT DOCUMENTED: ICD-10-PCS | Mod: CPTII,S$GLB,, | Performed by: INTERNAL MEDICINE

## 2022-07-21 PROCEDURE — 3078F PR MOST RECENT DIASTOLIC BLOOD PRESSURE < 80 MM HG: ICD-10-PCS | Mod: CPTII,S$GLB,, | Performed by: INTERNAL MEDICINE

## 2022-07-21 PROCEDURE — 3044F HG A1C LEVEL LT 7.0%: CPT | Mod: CPTII,S$GLB,, | Performed by: INTERNAL MEDICINE

## 2022-07-21 PROCEDURE — 3044F PR MOST RECENT HEMOGLOBIN A1C LEVEL <7.0%: ICD-10-PCS | Mod: CPTII,S$GLB,, | Performed by: INTERNAL MEDICINE

## 2022-07-21 PROCEDURE — 99999 PR PBB SHADOW E&M-EST. PATIENT-LVL III: ICD-10-PCS | Mod: PBBFAC,,, | Performed by: INTERNAL MEDICINE

## 2022-07-21 PROCEDURE — 3008F PR BODY MASS INDEX (BMI) DOCUMENTED: ICD-10-PCS | Mod: CPTII,S$GLB,, | Performed by: INTERNAL MEDICINE

## 2022-07-21 PROCEDURE — 1101F PT FALLS ASSESS-DOCD LE1/YR: CPT | Mod: CPTII,S$GLB,, | Performed by: INTERNAL MEDICINE

## 2022-07-21 PROCEDURE — 99215 OFFICE O/P EST HI 40 MIN: CPT | Mod: 25,S$GLB,, | Performed by: INTERNAL MEDICINE

## 2022-07-21 PROCEDURE — 3288F FALL RISK ASSESSMENT DOCD: CPT | Mod: CPTII,S$GLB,, | Performed by: INTERNAL MEDICINE

## 2022-07-21 PROCEDURE — 3008F BODY MASS INDEX DOCD: CPT | Mod: CPTII,S$GLB,, | Performed by: INTERNAL MEDICINE

## 2022-07-21 RX ORDER — HEPARIN 100 UNIT/ML
500 SYRINGE INTRAVENOUS
Status: CANCELLED | OUTPATIENT
Start: 2022-07-21

## 2022-07-21 RX ORDER — SODIUM CHLORIDE 0.9 % (FLUSH) 0.9 %
10 SYRINGE (ML) INJECTION
Status: CANCELLED | OUTPATIENT
Start: 2022-07-21

## 2022-07-21 RX ORDER — SODIUM CHLORIDE 0.9 % (FLUSH) 0.9 %
10 SYRINGE (ML) INJECTION
OUTPATIENT
Start: 2022-07-21

## 2022-07-21 RX ORDER — HEPARIN 100 UNIT/ML
500 SYRINGE INTRAVENOUS
OUTPATIENT
Start: 2022-07-21

## 2022-07-21 RX ADMIN — SODIUM CHLORIDE 2000 ML: 0.9 INJECTION, SOLUTION INTRAVENOUS at 08:07

## 2022-07-21 NOTE — TELEPHONE ENCOUNTER
----- Message from Bambi Olivera sent at 7/21/2022 12:01 PM CDT -----  Regarding: order  Contact: Parisa Lee  Ms Blackwell needs the clinic notes for a bedside commode faxed to her at [phone], please call ehr back if needed at [phone] ext [phone]

## 2022-07-21 NOTE — H&P (VIEW-ONLY)
Subjective:   Date of Visit: 7/21/22   ?   ?    REFERRING PROVIDER: No referring provider defined for this encounter.   ?   CHIEF COMPLAINT: metastatic renal cell carcinoma???????   ?   ONCOLOGIC DIAGNOSIS: Stage IV renal cell carcinoma  ?   CURRENT TREATMENT:  Keytruda plus axitinib    PAST TREATMENT:  None  ?   ONCOLOGIC HISTORY:     PET scan:  05/17/2022  Chest:     There are innumerable markedly hypermetabolic nodules scattered throughout both lungs highly suspicious for metastatic disease.  A large coalescent mass of nodules in the superior segment of the right lower lobe on image 72 measures 5.0 x 4.4 cm with a max SUV of 12.4.  One of the larger nodules in the left lung in the anterior aspect of the left upper lobe on image 59 measures 2.7 x 2.7 cm with a max SUV of 8.6.  There is extensive hypermetabolic lymphadenopathy with hypermetabolic enlarged nodes in the right paratracheal region, precarinal region, subcarinal region, and bilateral hilar regions.  A precarinal node on image 70 measures 4.8 x 3.9 cm with a max SUV of 7.4.  A subcarinal node on image 82 measures 7.8 x 3.7 cm with a max SUV of 8.1.  No pleural or pericardial effusion are present.     Abdomen/pelvis:     There is a large hypermetabolic mass arising from the lower pole of the left kidney highly suspicious for renal cell carcinoma.  There is central necrosis within this mass.  The mass appears to involve at least a portion of the left renal hilum but does not appear to extend into the left renal vein.  The mass measures 7.5 x 6.8 x 6.0 cm and has a max SUV of 13.0.  There is an adjacent hypermetabolic periaortic retroperitoneal lymph node consistent with neoplastic adenopathy.  On image 139 the node measures 3.4 x 2.8 cm with a max SUV of 6.6.  The adrenal glands are normal.  No ascites is present.  There is a small fat containing umbilical hernia.     Skeleton:     There is diffuse nonfocal hypermetabolism of the bone marrow  predominantly in the axial skeleton of uncertain significance.  This pattern can be seen with various blood dyscrasias and in smokers, but the possibility of diffuse osseous metastatic disease cannot be excluded.     Impression:     1. Large hypermetabolic lower pole left renal mass highly suspicious for renal cell carcinoma.  There is an adjacent enlarged hypermetabolic retroperitoneal lymph node highly suspicious for neoplastic adenopathy.  2. Innumerable markedly hypermetabolic bilateral pulmonary nodules consistent with metastatic disease.  There is also extensive hypermetabolic mediastinal and bilateral pulmonary hilar lymph lymphadenopathy consistent with metastatic disease.  3. Diffuse nonfocal hypermetabolism predominantly within the axial skeleton of uncertain significance.  This pattern can be seen with various blood dyscrasias and in smokers, but the possibility of diffuse osseous metastatic disease cannot be excluded.       HPI:   67-year-old female with history of hyperlipidemia, hypertension, was referred to us by Dr. Rosario due to recently noted abnormal PET scan.  Interestingly patient has recently complained of unintentional weight loss and persistent cough.  Chest x-ray showed multiple pulmonary nodules suspicious for metastasis.  This necessitated PET-CT scan that revealed findings stated above.    Today she continues to complain weight loss of more than 20 lb over the past 6 months, shortness of breath, persistent nonproductive cough. Also had 2 episodes of hematuria. She denies fever, chills or flank pain.    Denies smoking or alcohol use.  No pertinent family history.    Interval Hx: 68 yo female with metastatic renal cell carcinoma presents today for C1D1 of chemo/immunotherapy with keytruda and axitinib. ECOG 2    Review of Systems   Constitutional: Positive for fatigue. Negative for activity change, appetite change, chills, fever and unexpected weight change.   HENT: Negative for hearing  loss, mouth sores, nosebleeds, sore throat, tinnitus, trouble swallowing and voice change.    Eyes: Negative for visual disturbance.   Respiratory: Negative for cough, chest tightness and shortness of breath.    Cardiovascular: Negative for chest pain, palpitations and leg swelling.   Gastrointestinal: Negative for abdominal pain, anal bleeding, blood in stool, constipation, diarrhea, nausea and vomiting.   Genitourinary: Negative for dysuria, frequency, hematuria, pelvic pain, vaginal bleeding and vaginal pain.   Musculoskeletal: Positive for back pain. Negative for arthralgias, joint swelling and neck pain.   Skin: Negative for color change, pallor, rash and wound.   Allergic/Immunologic: Negative for immunocompromised state.   Neurological: Positive for weakness. Negative for dizziness, tremors, syncope, speech difficulty, light-headedness and headaches.   Hematological: Negative for adenopathy. Does not bruise/bleed easily.   Psychiatric/Behavioral: Negative for agitation, confusion, decreased concentration, hallucinations and sleep disturbance. The patient is not nervous/anxious.        ?   PAST MEDICAL HISTORY:   Past Medical History:   Diagnosis Date    Hyperlipidemia     Hypertension     Stroke     ?     PAST SURGICAL HISTORY:   Past Surgical History:   Procedure Laterality Date    BRONCHOSCOPY Bilateral 6/6/2022    Procedure: Bronchoscopy - insert lighted tube into airway to take a biopsy of lung;  Surgeon: Michi Ceron MD;  Location: [facility];  Service: Endoscopy;  Laterality: Bilateral;    ENDOBRONCHIAL ULTRASOUND Bilateral 6/6/2022    Procedure: ENDOBRONCHIAL ULTRASOUND (EBUS);  Surgeon: Michi Ceron MD;  Location: [facility];  Service: Endoscopy;  Laterality: Bilateral;    TONSILLECTOMY        ?   ALLERGIES:   Allergies as of 07/21/2022    (No Known Allergies)      ?   MEDICATIONS:?   Outpatient Medications Marked as Taking for the 7/21/22 encounter (Office Visit) with Ghassan COX  MD Rosa   Medication Sig Dispense Refill    amLODIPine (NORVASC) 10 MG tablet Take 1 tablet (10 mg total) by mouth once daily. 90 tablet 1    atorvastatin (LIPITOR) 80 MG tablet Take 1 tablet (80 mg total) by mouth once daily. 90 tablet 1    axitinib (INLYTA) 5 mg Tab Take 1 tablet (5 mg) by mouth 2 (two) times daily. 120 tablet 11    carvediloL (COREG) 6.25 MG tablet TAKE 1 TABLET BY MOUTH TWICE DAILY WITH MEALS 180 tablet 0    cephALEXin (KEFLEX) 500 MG capsule Take 1 capsule (500 mg total) by mouth 4 (four) times daily. for 7 days 28 capsule 0    cetirizine (ZYRTEC) 10 MG tablet Take 1 tablet (10 mg total) by mouth once daily. 30 tablet 11    fluticasone propionate (FLONASE) 50 mcg/actuation nasal spray Use 2 sprays to each nostril daily 16 g 0    hydroCHLOROthiazide (HYDRODIURIL) 25 MG tablet Take 1 tablet (25 mg total) by mouth once daily. 90 tablet 1    ondansetron (ZOFRAN-ODT) 8 MG TbDL Take 1 tablet (8 mg total) by mouth every 12 (twelve) hours as needed. 90 tablet 1    potassium chloride SA (K-DUR,KLOR-CON) 20 MEQ tablet Take 1 tablet (20 mEq total) by mouth once daily. 90 tablet 1    prochlorperazine (COMPAZINE) 5 MG tablet Take 1 tablet (5 mg total) by mouth every 6 (six) hours as needed. 90 tablet 1      ?   SOCIAL HISTORY:?   Social History     Tobacco Use    Smoking status: Never Smoker    Smokeless tobacco: Never Used   Substance Use Topics    Alcohol use: No     Alcohol/week: 0.0 standard drinks        ?   FAMILY HISTORY:   family history includes Stroke in her father, mother, and sister.   ?     Objective:      Physical Exam  Constitutional:       General: She is not in acute distress.     Appearance: She is well-developed. She is obese. She is not ill-appearing or toxic-appearing.   HENT:      Head: Normocephalic and atraumatic.      Mouth/Throat:      Pharynx: No oropharyngeal exudate.   Eyes:      General: No scleral icterus.        Right eye: No discharge.         Left eye: No  discharge.      Conjunctiva/sclera: Conjunctivae normal.      Pupils: Pupils are equal, round, and reactive to light.   Neck:      Thyroid: No thyromegaly.   Cardiovascular:      Rate and Rhythm: Normal rate and regular rhythm.      Heart sounds: No murmur heard.  Pulmonary:      Effort: Pulmonary effort is normal. No respiratory distress.      Breath sounds: Normal breath sounds.   Chest:      Chest wall: No tenderness.   Breasts:      Right: No supraclavicular adenopathy.      Left: No supraclavicular adenopathy.       Abdominal:      General: Bowel sounds are normal. There is no distension.      Palpations: Abdomen is soft. There is no mass.      Tenderness: There is no abdominal tenderness. There is no guarding or rebound.   Musculoskeletal:         General: No tenderness. Normal range of motion.      Cervical back: Normal range of motion and neck supple.   Lymphadenopathy:      Cervical: No cervical adenopathy.      Right cervical: No superficial cervical adenopathy.     Left cervical: No superficial cervical adenopathy.      Upper Body:      Right upper body: No supraclavicular or pectoral adenopathy.      Left upper body: No supraclavicular or pectoral adenopathy.   Skin:     General: Skin is warm and dry.      Capillary Refill: Capillary refill takes 2 to 3 seconds.      Coloration: Skin is not pale.      Findings: No erythema or rash.   Neurological:      Mental Status: She is alert and oriented to person, place, and time.      Cranial Nerves: No cranial nerve deficit.      Sensory: No sensory deficit.   Psychiatric:         Behavior: Behavior normal. Behavior is cooperative.         Judgment: Judgment normal.         ?   Vitals:    07/21/22 0714   BP: (!) 114/58   Pulse: (!) 54   Resp: 18      ?     ECOG SCORE    2 - Capable of all selfcare but unable to carry out any work activities, active > 50% of hours         ?   Laboratory:  ?   No visits with results within 1 Day(s) from this visit.   Latest known  visit with results is:   Admission on 07/19/2022, Discharged on 07/19/2022   Component Date Value Ref Range Status    WBC 07/19/2022 11.26  3.90 - 12.70 K/uL Final    RBC 07/19/2022 5.54 (A) 4.00 - 5.40 M/uL Final    Hemoglobin 07/19/2022 14.1  12.0 - 16.0 g/dL Final    Hematocrit 07/19/2022 46.2  37.0 - 48.5 % Final    MCV 07/19/2022 83  82 - 98 fL Final    MCH 07/19/2022 25.5 (A) 27.0 - 31.0 pg Final    MCHC 07/19/2022 30.5 (A) 32.0 - 36.0 g/dL Final    RDW 07/19/2022 19.2 (A) 11.5 - 14.5 % Final    Platelets 07/19/2022 438  150 - 450 K/uL Final    MPV 07/19/2022 9.5  9.2 - 12.9 fL Final    Immature Granulocytes 07/19/2022 0.6 (A) 0.0 - 0.5 % Final    Gran # (ANC) 07/19/2022 7.6  1.8 - 7.7 K/uL Final    Immature Grans (Abs) 07/19/2022 0.07 (A) 0.00 - 0.04 K/uL Final    Lymph # 07/19/2022 1.7  1.0 - 4.8 K/uL Final    Mono # 07/19/2022 0.9  0.3 - 1.0 K/uL Final    Eos # 07/19/2022 0.9 (A) 0.0 - 0.5 K/uL Final    Baso # 07/19/2022 0.14  0.00 - 0.20 K/uL Final    nRBC 07/19/2022 0  0 /100 WBC Final    Gran % 07/19/2022 67.2  38.0 - 73.0 % Final    Lymph % 07/19/2022 15.4 (A) 18.0 - 48.0 % Final    Mono % 07/19/2022 7.5  4.0 - 15.0 % Final    Eosinophil % 07/19/2022 8.1 (A) 0.0 - 8.0 % Final    Basophil % 07/19/2022 1.2  0.0 - 1.9 % Final    Differential Method 07/19/2022 Automated   Final    Sodium 07/19/2022 139  136 - 145 mmol/L Final    Potassium 07/19/2022 3.4 (A) 3.5 - 5.1 mmol/L Final    Chloride 07/19/2022 103  95 - 110 mmol/L Final    CO2 07/19/2022 30  22 - 31 mmol/L Final    Glucose 07/19/2022 174 (A) 70 - 110 mg/dL Final    BUN 07/19/2022 32 (A) 7 - 18 mg/dL Final    Creatinine 07/19/2022 1.10  0.50 - 1.40 mg/dL Final    Calcium 07/19/2022 12.0 (A) 8.4 - 10.2 mg/dL Final    Total Protein 07/19/2022 7.8  6.0 - 8.4 g/dL Final    Albumin 07/19/2022 3.8  3.5 - 5.2 g/dL Final    Total Bilirubin 07/19/2022 0.9  0.2 - 1.3 mg/dL Final    Alkaline Phosphatase 07/19/2022 126  38 -  145 U/L Final    AST 07/19/2022 76 (A) 14 - 36 U/L Final    ALT 07/19/2022 13  0 - 35 U/L Final    Anion Gap 07/19/2022 6 (A) 8 - 16 mmol/L Final    eGFR if  07/19/2022 59 (A) >60 mL/min/1.73 m^2 Final    eGFR if non African American 07/19/2022 51 (A) >60 mL/min/1.73 m^2 Final    Magnesium 07/19/2022 2.1  1.6 - 2.6 mg/dL Final    Specimen UA 07/19/2022 Urine, Clean Catch   Final    Color, UA 07/19/2022 Yellow  Yellow, Straw, Rose Final    Appearance, UA 07/19/2022 Cloudy (A) Clear Final    pH, UA 07/19/2022 5.5  5.0 - 8.0 Final    Specific Gravity, UA 07/19/2022 1.020  1.005 - 1.030 Final    Protein, UA 07/19/2022 1+ (A) Negative Final    Glucose, UA 07/19/2022 Negative  Negative Final    Ketones, UA 07/19/2022 Trace (A) Negative Final    Bilirubin (UA) 07/19/2022 Negative  Negative Final    Occult Blood UA 07/19/2022 3+ (A) Negative Final    Nitrite, UA 07/19/2022 Negative  Negative Final    Urobilinogen, UA 07/19/2022 0.2  <2.0 EU/dL Final    Leukocytes, UA 07/19/2022 1+ (A) Negative Final    Troponin I 07/19/2022 <0.012  0.012 - 0.034 ng/mL Final    RBC, UA 07/19/2022 13 (A) 0 - 4 /hpf Final    WBC, UA 07/19/2022 35 (A) 0 - 5 /hpf Final    Bacteria 07/19/2022 Negative  Negative /hpf Final    Squam Epithel, UA 07/19/2022 8  /hpf Final    Hyaline Casts, UA 07/19/2022 5 (A) 0 - 1 /lpf Final    Microscopic Comment 07/19/2022 SEE COMMENT   Final    Urine Culture, Routine 07/19/2022 No growth to date   Preliminary    Ionized Calcium 07/19/2022 1.58 (A) 1.06 - 1.42 mmol/L Final    Phosphorus 07/19/2022 2.7  2.7 - 4.5 mg/dL Final      ?   Tumor markers   ?   ?   Imaging: Echo  · The left ventricular global longitudinal strain is -18.1%.  · Mild left atrial enlargement.  · The left ventricle is normal in size with normal systolic function.  · Indeterminate left ventricular diastolic function.  · Intermediate central venous pressure (8 mmHg).  · The estimated PA systolic  pressure is 27 mmHg.  · The quantitatively derived ejection fraction is 60%.  · Normal right ventricular size with normal right ventricular systolic   function.  · The estimated ejection fraction is 65%.  · There is mild aortic valve stenosis.  · Aortic valve area is 2.12 cm2; peak velocity is 2.31 m/s; mean gradient   is 12 mmHg.     Baseline Echo 7/19/2022  Biplane 66%  4D LVQ 60%  Average GPLS -18.1%     ?      Pathology:  Pathology Results  (Last 10 years)               06/30/22 0928  Specimen to Pathology, Radiology Lung, biopsy not tranplant (Abnormal) Final result    Narrative:  Release to patient->Immediate       06/06/22 1423  Specimen to Pathology, Surgery Pulmonary and Thoracic Final result    Narrative:  Pre-op Diagnosis: Abnormal PET scan, lung [R94.2]   Malignant neoplasm of left kidney [C64.2]   Procedure(s):   Bronchoscopy - insert lighted tube into airway to take a   biopsy of lung   ENDOBRONCHIAL ULTRASOUND (EBUS)   Number of specimens: 3   Name of specimens:   1-RLL Bx   2-RML Bx   3-RUL bx   Which provider would you like to cc?->JAZMYN TORRES   Release to patient->Immediate   Specimen total (fresh, frozen, permanent):->3              ?   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 Malignant neoplasm of left kidney          Malignant neoplasm of left kidney  Metastatic renal cell carcinoma. Reviewed pathology report from right lung biopsy. MRI of the brain revealed an area of enhancement in the left frontal bone extending into the soft tissue concerning for metastatic focus.    She understands that her disease is incurable but can be treated.     Regimen:   A cycle is every 21 days:  ? Axitinib   (Inlyta)  5 mg flat dose orally twice daily on days 1 through 21  ? Pembrolizumab   (Keytruda)  200 mg flat dose IV once on day 1 for up to 35 cycles (N Engl. J Med 2019;380(12):2323-9388)    2D-ECHO from 7/19/2022 showed EF of 60%.     Reviewed labs, no concerning cytopenia,  will proceed with C1D1. RTC in 3 weeks with labs.     Acute cystitis: currently on abx course      ?Immunodeficiency due to chemotherapy  -     CBC Auto Differential; Future; Expected date: 07/21/2022  -     Comprehensive Metabolic Panel; Future; Expected date: 07/21/2022  -     TSH; Future; Expected date: 07/21/2022    Malignant neoplasm of left kidney  -     CBC Auto Differential; Future; Expected date: 07/21/2022  -     Comprehensive Metabolic Panel; Future; Expected date: 07/21/2022  -     TSH; Future; Expected date: 07/21/2022      Follow-Up: Follow up in about 3 weeks (around 8/11/2022).    KAITLIN BOGGS Md., Ph.D  Hematology & Oncology Department  Phone #: [phone]

## 2022-07-21 NOTE — DISCHARGE INSTRUCTIONS
[facility]  [id number] [facility]  [address]  [phone] phone     [phone] fax  Hours of Operation: Monday- Friday 8:00am- 5:00pm  After hours phone  [phone]  Hematology / Oncology Physicians on call      KEYUR Rey [address] Dr., CLINTON Duarte, CLINTON Bassett, ELICEO Gutierrez    Please call with any concerns regarding your appointment today.     FALL PREVENTION   Falls often occur due to slipping, tripping or losing your balance. Here are ways to reduce your risk of falling again.   Was there anything that caused your fall that can be fixed, removed or replaced?   Make your home safe by keeping walkways clear of objects you may trip over.   Use non-slip pads under rugs.   Do not walk in poorly lit areas.   Do not stand on chairs or wobbly ladders.   Use caution when reaching overhead or looking upward. This position can cause a loss of balance.   Be sure your shoes fit properly, have non-slip bottoms and are in good condition.   Be cautious when going up and down stairs, curbs, and when walking on uneven sidewalks.   If your balance is poor, consider using a cane or walker.   If your fall was related to alcohol use, stop or limit alcohol intake.   If your fall was related to use of sleeping medicines, talk to your doctor about this. You may need to reduce your dosage at bedtime if you awaken during the night to go to the bathroom.   To reduce the need for nighttime bathroom trips:   Avoid drinking fluids for several hours before going to bed   Empty your bladder before going to bed   Men can keep a urinal at the bedside   © 2382-7073 [employer] [facility], [address] [place] [address], [place] [address]. All rights reserved. This information is not intended as a substitute for professional medical care. Always follow your healthcare professional's instructions.

## 2022-07-21 NOTE — PROGRESS NOTES
Subjective:   Date of Visit: 7/21/22   ?   ?    REFERRING PROVIDER: No referring provider defined for this encounter.   ?   CHIEF COMPLAINT: metastatic renal cell carcinoma???????   ?   ONCOLOGIC DIAGNOSIS: Stage IV renal cell carcinoma  ?   CURRENT TREATMENT:  Keytruda plus axitinib    PAST TREATMENT:  None  ?   ONCOLOGIC HISTORY:     PET scan:  05/17/2022  Chest:     There are innumerable markedly hypermetabolic nodules scattered throughout both lungs highly suspicious for metastatic disease.  A large coalescent mass of nodules in the superior segment of the right lower lobe on image 72 measures 5.0 x 4.4 cm with a max SUV of 12.4.  One of the larger nodules in the left lung in the anterior aspect of the left upper lobe on image 59 measures 2.7 x 2.7 cm with a max SUV of 8.6.  There is extensive hypermetabolic lymphadenopathy with hypermetabolic enlarged nodes in the right paratracheal region, precarinal region, subcarinal region, and bilateral hilar regions.  A precarinal node on image 70 measures 4.8 x 3.9 cm with a max SUV of 7.4.  A subcarinal node on image 82 measures 7.8 x 3.7 cm with a max SUV of 8.1.  No pleural or pericardial effusion are present.     Abdomen/pelvis:     There is a large hypermetabolic mass arising from the lower pole of the left kidney highly suspicious for renal cell carcinoma.  There is central necrosis within this mass.  The mass appears to involve at least a portion of the left renal hilum but does not appear to extend into the left renal vein.  The mass measures 7.5 x 6.8 x 6.0 cm and has a max SUV of 13.0.  There is an adjacent hypermetabolic periaortic retroperitoneal lymph node consistent with neoplastic adenopathy.  On image 139 the node measures 3.4 x 2.8 cm with a max SUV of 6.6.  The adrenal glands are normal.  No ascites is present.  There is a small fat containing umbilical hernia.     Skeleton:     There is diffuse nonfocal hypermetabolism of the bone marrow  predominantly in the axial skeleton of uncertain significance.  This pattern can be seen with various blood dyscrasias and in smokers, but the possibility of diffuse osseous metastatic disease cannot be excluded.     Impression:     1. Large hypermetabolic lower pole left renal mass highly suspicious for renal cell carcinoma.  There is an adjacent enlarged hypermetabolic retroperitoneal lymph node highly suspicious for neoplastic adenopathy.  2. Innumerable markedly hypermetabolic bilateral pulmonary nodules consistent with metastatic disease.  There is also extensive hypermetabolic mediastinal and bilateral pulmonary hilar lymph lymphadenopathy consistent with metastatic disease.  3. Diffuse nonfocal hypermetabolism predominantly within the axial skeleton of uncertain significance.  This pattern can be seen with various blood dyscrasias and in smokers, but the possibility of diffuse osseous metastatic disease cannot be excluded.       HPI:   67-year-old female with history of hyperlipidemia, hypertension, was referred to us by Dr. Rosario due to recently noted abnormal PET scan.  Interestingly patient has recently complained of unintentional weight loss and persistent cough.  Chest x-ray showed multiple pulmonary nodules suspicious for metastasis.  This necessitated PET-CT scan that revealed findings stated above.    Today she continues to complain weight loss of more than 20 lb over the past 6 months, shortness of breath, persistent nonproductive cough. Also had 2 episodes of hematuria. She denies fever, chills or flank pain.    Denies smoking or alcohol use.  No pertinent family history.    Interval Hx: 66 yo female with metastatic renal cell carcinoma presents today for C1D1 of chemo/immunotherapy with keytruda and axitinib. ECOG 2    Review of Systems   Constitutional: Positive for fatigue. Negative for activity change, appetite change, chills, fever and unexpected weight change.   HENT: Negative for hearing  loss, mouth sores, nosebleeds, sore throat, tinnitus, trouble swallowing and voice change.    Eyes: Negative for visual disturbance.   Respiratory: Negative for cough, chest tightness and shortness of breath.    Cardiovascular: Negative for chest pain, palpitations and leg swelling.   Gastrointestinal: Negative for abdominal pain, anal bleeding, blood in stool, constipation, diarrhea, nausea and vomiting.   Genitourinary: Negative for dysuria, frequency, hematuria, pelvic pain, vaginal bleeding and vaginal pain.   Musculoskeletal: Positive for back pain. Negative for arthralgias, joint swelling and neck pain.   Skin: Negative for color change, pallor, rash and wound.   Allergic/Immunologic: Negative for immunocompromised state.   Neurological: Positive for weakness. Negative for dizziness, tremors, syncope, speech difficulty, light-headedness and headaches.   Hematological: Negative for adenopathy. Does not bruise/bleed easily.   Psychiatric/Behavioral: Negative for agitation, confusion, decreased concentration, hallucinations and sleep disturbance. The patient is not nervous/anxious.        ?   PAST MEDICAL HISTORY:   Past Medical History:   Diagnosis Date    Hyperlipidemia     Hypertension     Stroke     ?     PAST SURGICAL HISTORY:   Past Surgical History:   Procedure Laterality Date    BRONCHOSCOPY Bilateral 6/6/2022    Procedure: Bronchoscopy - insert lighted tube into airway to take a biopsy of lung;  Surgeon: Michi Ceron MD;  Location: [facility];  Service: Endoscopy;  Laterality: Bilateral;    ENDOBRONCHIAL ULTRASOUND Bilateral 6/6/2022    Procedure: ENDOBRONCHIAL ULTRASOUND (EBUS);  Surgeon: Michi Ceron MD;  Location: [facility];  Service: Endoscopy;  Laterality: Bilateral;    TONSILLECTOMY        ?   ALLERGIES:   Allergies as of 07/21/2022    (No Known Allergies)      ?   MEDICATIONS:?   Outpatient Medications Marked as Taking for the 7/21/22 encounter (Office Visit) with Ghassan COX  MD Rosa   Medication Sig Dispense Refill    amLODIPine (NORVASC) 10 MG tablet Take 1 tablet (10 mg total) by mouth once daily. 90 tablet 1    atorvastatin (LIPITOR) 80 MG tablet Take 1 tablet (80 mg total) by mouth once daily. 90 tablet 1    axitinib (INLYTA) 5 mg Tab Take 1 tablet (5 mg) by mouth 2 (two) times daily. 120 tablet 11    carvediloL (COREG) 6.25 MG tablet TAKE 1 TABLET BY MOUTH TWICE DAILY WITH MEALS 180 tablet 0    cephALEXin (KEFLEX) 500 MG capsule Take 1 capsule (500 mg total) by mouth 4 (four) times daily. for 7 days 28 capsule 0    cetirizine (ZYRTEC) 10 MG tablet Take 1 tablet (10 mg total) by mouth once daily. 30 tablet 11    fluticasone propionate (FLONASE) 50 mcg/actuation nasal spray Use 2 sprays to each nostril daily 16 g 0    hydroCHLOROthiazide (HYDRODIURIL) 25 MG tablet Take 1 tablet (25 mg total) by mouth once daily. 90 tablet 1    ondansetron (ZOFRAN-ODT) 8 MG TbDL Take 1 tablet (8 mg total) by mouth every 12 (twelve) hours as needed. 90 tablet 1    potassium chloride SA (K-DUR,KLOR-CON) 20 MEQ tablet Take 1 tablet (20 mEq total) by mouth once daily. 90 tablet 1    prochlorperazine (COMPAZINE) 5 MG tablet Take 1 tablet (5 mg total) by mouth every 6 (six) hours as needed. 90 tablet 1      ?   SOCIAL HISTORY:?   Social History     Tobacco Use    Smoking status: Never Smoker    Smokeless tobacco: Never Used   Substance Use Topics    Alcohol use: No     Alcohol/week: 0.0 standard drinks        ?   FAMILY HISTORY:   family history includes Stroke in her father, mother, and sister.   ?     Objective:      Physical Exam  Constitutional:       General: She is not in acute distress.     Appearance: She is well-developed. She is obese. She is not ill-appearing or toxic-appearing.   HENT:      Head: Normocephalic and atraumatic.      Mouth/Throat:      Pharynx: No oropharyngeal exudate.   Eyes:      General: No scleral icterus.        Right eye: No discharge.         Left eye: No  discharge.      Conjunctiva/sclera: Conjunctivae normal.      Pupils: Pupils are equal, round, and reactive to light.   Neck:      Thyroid: No thyromegaly.   Cardiovascular:      Rate and Rhythm: Normal rate and regular rhythm.      Heart sounds: No murmur heard.  Pulmonary:      Effort: Pulmonary effort is normal. No respiratory distress.      Breath sounds: Normal breath sounds.   Chest:      Chest wall: No tenderness.   Breasts:      Right: No supraclavicular adenopathy.      Left: No supraclavicular adenopathy.       Abdominal:      General: Bowel sounds are normal. There is no distension.      Palpations: Abdomen is soft. There is no mass.      Tenderness: There is no abdominal tenderness. There is no guarding or rebound.   Musculoskeletal:         General: No tenderness. Normal range of motion.      Cervical back: Normal range of motion and neck supple.   Lymphadenopathy:      Cervical: No cervical adenopathy.      Right cervical: No superficial cervical adenopathy.     Left cervical: No superficial cervical adenopathy.      Upper Body:      Right upper body: No supraclavicular or pectoral adenopathy.      Left upper body: No supraclavicular or pectoral adenopathy.   Skin:     General: Skin is warm and dry.      Capillary Refill: Capillary refill takes 2 to 3 seconds.      Coloration: Skin is not pale.      Findings: No erythema or rash.   Neurological:      Mental Status: She is alert and oriented to person, place, and time.      Cranial Nerves: No cranial nerve deficit.      Sensory: No sensory deficit.   Psychiatric:         Behavior: Behavior normal. Behavior is cooperative.         Judgment: Judgment normal.         ?   Vitals:    07/21/22 0714   BP: (!) 114/58   Pulse: (!) 54   Resp: 18      ?     ECOG SCORE    2 - Capable of all selfcare but unable to carry out any work activities, active > 50% of hours         ?   Laboratory:  ?   No visits with results within 1 Day(s) from this visit.   Latest known  visit with results is:   Admission on 07/19/2022, Discharged on 07/19/2022   Component Date Value Ref Range Status    WBC 07/19/2022 11.26  3.90 - 12.70 K/uL Final    RBC 07/19/2022 5.54 (A) 4.00 - 5.40 M/uL Final    Hemoglobin 07/19/2022 14.1  12.0 - 16.0 g/dL Final    Hematocrit 07/19/2022 46.2  37.0 - 48.5 % Final    MCV 07/19/2022 83  82 - 98 fL Final    MCH 07/19/2022 25.5 (A) 27.0 - 31.0 pg Final    MCHC 07/19/2022 30.5 (A) 32.0 - 36.0 g/dL Final    RDW 07/19/2022 19.2 (A) 11.5 - 14.5 % Final    Platelets 07/19/2022 438  150 - 450 K/uL Final    MPV 07/19/2022 9.5  9.2 - 12.9 fL Final    Immature Granulocytes 07/19/2022 0.6 (A) 0.0 - 0.5 % Final    Gran # (ANC) 07/19/2022 7.6  1.8 - 7.7 K/uL Final    Immature Grans (Abs) 07/19/2022 0.07 (A) 0.00 - 0.04 K/uL Final    Lymph # 07/19/2022 1.7  1.0 - 4.8 K/uL Final    Mono # 07/19/2022 0.9  0.3 - 1.0 K/uL Final    Eos # 07/19/2022 0.9 (A) 0.0 - 0.5 K/uL Final    Baso # 07/19/2022 0.14  0.00 - 0.20 K/uL Final    nRBC 07/19/2022 0  0 /100 WBC Final    Gran % 07/19/2022 67.2  38.0 - 73.0 % Final    Lymph % 07/19/2022 15.4 (A) 18.0 - 48.0 % Final    Mono % 07/19/2022 7.5  4.0 - 15.0 % Final    Eosinophil % 07/19/2022 8.1 (A) 0.0 - 8.0 % Final    Basophil % 07/19/2022 1.2  0.0 - 1.9 % Final    Differential Method 07/19/2022 Automated   Final    Sodium 07/19/2022 139  136 - 145 mmol/L Final    Potassium 07/19/2022 3.4 (A) 3.5 - 5.1 mmol/L Final    Chloride 07/19/2022 103  95 - 110 mmol/L Final    CO2 07/19/2022 30  22 - 31 mmol/L Final    Glucose 07/19/2022 174 (A) 70 - 110 mg/dL Final    BUN 07/19/2022 32 (A) 7 - 18 mg/dL Final    Creatinine 07/19/2022 1.10  0.50 - 1.40 mg/dL Final    Calcium 07/19/2022 12.0 (A) 8.4 - 10.2 mg/dL Final    Total Protein 07/19/2022 7.8  6.0 - 8.4 g/dL Final    Albumin 07/19/2022 3.8  3.5 - 5.2 g/dL Final    Total Bilirubin 07/19/2022 0.9  0.2 - 1.3 mg/dL Final    Alkaline Phosphatase 07/19/2022 126  38 -  145 U/L Final    AST 07/19/2022 76 (A) 14 - 36 U/L Final    ALT 07/19/2022 13  0 - 35 U/L Final    Anion Gap 07/19/2022 6 (A) 8 - 16 mmol/L Final    eGFR if  07/19/2022 59 (A) >60 mL/min/1.73 m^2 Final    eGFR if non African American 07/19/2022 51 (A) >60 mL/min/1.73 m^2 Final    Magnesium 07/19/2022 2.1  1.6 - 2.6 mg/dL Final    Specimen UA 07/19/2022 Urine, Clean Catch   Final    Color, UA 07/19/2022 Yellow  Yellow, Straw, Rose Final    Appearance, UA 07/19/2022 Cloudy (A) Clear Final    pH, UA 07/19/2022 5.5  5.0 - 8.0 Final    Specific Gravity, UA 07/19/2022 1.020  1.005 - 1.030 Final    Protein, UA 07/19/2022 1+ (A) Negative Final    Glucose, UA 07/19/2022 Negative  Negative Final    Ketones, UA 07/19/2022 Trace (A) Negative Final    Bilirubin (UA) 07/19/2022 Negative  Negative Final    Occult Blood UA 07/19/2022 3+ (A) Negative Final    Nitrite, UA 07/19/2022 Negative  Negative Final    Urobilinogen, UA 07/19/2022 0.2  <2.0 EU/dL Final    Leukocytes, UA 07/19/2022 1+ (A) Negative Final    Troponin I 07/19/2022 <0.012  0.012 - 0.034 ng/mL Final    RBC, UA 07/19/2022 13 (A) 0 - 4 /hpf Final    WBC, UA 07/19/2022 35 (A) 0 - 5 /hpf Final    Bacteria 07/19/2022 Negative  Negative /hpf Final    Squam Epithel, UA 07/19/2022 8  /hpf Final    Hyaline Casts, UA 07/19/2022 5 (A) 0 - 1 /lpf Final    Microscopic Comment 07/19/2022 SEE COMMENT   Final    Urine Culture, Routine 07/19/2022 No growth to date   Preliminary    Ionized Calcium 07/19/2022 1.58 (A) 1.06 - 1.42 mmol/L Final    Phosphorus 07/19/2022 2.7  2.7 - 4.5 mg/dL Final      ?   Tumor markers   ?   ?   Imaging: Echo  · The left ventricular global longitudinal strain is -18.1%.  · Mild left atrial enlargement.  · The left ventricle is normal in size with normal systolic function.  · Indeterminate left ventricular diastolic function.  · Intermediate central venous pressure (8 mmHg).  · The estimated PA systolic  pressure is 27 mmHg.  · The quantitatively derived ejection fraction is 60%.  · Normal right ventricular size with normal right ventricular systolic   function.  · The estimated ejection fraction is 65%.  · There is mild aortic valve stenosis.  · Aortic valve area is 2.12 cm2; peak velocity is 2.31 m/s; mean gradient   is 12 mmHg.     Baseline Echo 7/19/2022  Biplane 66%  4D LVQ 60%  Average GPLS -18.1%     ?      Pathology:  Pathology Results  (Last 10 years)               06/30/22 0928  Specimen to Pathology, Radiology Lung, biopsy not tranplant (Abnormal) Final result    Narrative:  Release to patient->Immediate       06/06/22 1423  Specimen to Pathology, Surgery Pulmonary and Thoracic Final result    Narrative:  Pre-op Diagnosis: Abnormal PET scan, lung [R94.2]   Malignant neoplasm of left kidney [C64.2]   Procedure(s):   Bronchoscopy - insert lighted tube into airway to take a   biopsy of lung   ENDOBRONCHIAL ULTRASOUND (EBUS)   Number of specimens: 3   Name of specimens:   1-RLL Bx   2-RML Bx   3-RUL bx   Which provider would you like to cc?->JAZMYN TORRES   Release to patient->Immediate   Specimen total (fresh, frozen, permanent):->3              ?   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 Malignant neoplasm of left kidney          Malignant neoplasm of left kidney  Metastatic renal cell carcinoma. Reviewed pathology report from right lung biopsy. MRI of the brain revealed an area of enhancement in the left frontal bone extending into the soft tissue concerning for metastatic focus.    She understands that her disease is incurable but can be treated.     Regimen:   A cycle is every 21 days:  ? Axitinib   (Inlyta)  5 mg flat dose orally twice daily on days 1 through 21  ? Pembrolizumab   (Keytruda)  200 mg flat dose IV once on day 1 for up to 35 cycles (N Engl. J Med 2019;380(12):1748-5463)    2D-ECHO from 7/19/2022 showed EF of 60%.     Reviewed labs, no concerning cytopenia,  will proceed with C1D1. RTC in 3 weeks with labs.     Acute cystitis: currently on abx course      ?Immunodeficiency due to chemotherapy  -     CBC Auto Differential; Future; Expected date: 07/21/2022  -     Comprehensive Metabolic Panel; Future; Expected date: 07/21/2022  -     TSH; Future; Expected date: 07/21/2022    Malignant neoplasm of left kidney  -     CBC Auto Differential; Future; Expected date: 07/21/2022  -     Comprehensive Metabolic Panel; Future; Expected date: 07/21/2022  -     TSH; Future; Expected date: 07/21/2022      Follow-Up: Follow up in about 3 weeks (around 8/11/2022).    KAITLIN BOGGS Md., Ph.D  Hematology & Oncology Department  Phone #: [phone]

## 2022-07-22 ENCOUNTER — PATIENT MESSAGE (OUTPATIENT)
Dept: FAMILY MEDICINE | Facility: CLINIC | Age: 67
End: 2022-07-22
Payer: MEDICARE

## 2022-07-22 ENCOUNTER — HOSPITAL ENCOUNTER (OUTPATIENT)
Dept: RADIOLOGY | Facility: HOSPITAL | Age: 67
Discharge: HOME | End: 2022-07-22
Attending: INTERNAL MEDICINE
Payer: MEDICARE

## 2022-07-22 ENCOUNTER — HOSPITAL ENCOUNTER (OUTPATIENT)
Facility: HOSPITAL | Age: 67
Discharge: HOME | End: 2022-07-22
Attending: STUDENT IN AN ORGANIZED HEALTH CARE EDUCATION/TRAINING PROGRAM | Admitting: STUDENT IN AN ORGANIZED HEALTH CARE EDUCATION/TRAINING PROGRAM
Payer: MEDICARE

## 2022-07-22 VITALS
RESPIRATION RATE: 18 BRPM | DIASTOLIC BLOOD PRESSURE: 57 MMHG | OXYGEN SATURATION: 92 % | BODY MASS INDEX: 28.14 KG/M2 | TEMPERATURE: 98 F | SYSTOLIC BLOOD PRESSURE: 150 MMHG | HEIGHT: 66 IN | WEIGHT: 175.06 LBS | HEART RATE: 74 BPM

## 2022-07-22 DIAGNOSIS — C64.2 MALIGNANT NEOPLASM OF LEFT KIDNEY: ICD-10-CM

## 2022-07-22 LAB
CTP QC/QA: YES
SARS-COV-2 AG RESP QL IA.RAPID: NEGATIVE

## 2022-07-22 PROCEDURE — C1788 PORT, INDWELLING, IMP: HCPCS | Performed by: STUDENT IN AN ORGANIZED HEALTH CARE EDUCATION/TRAINING PROGRAM

## 2022-07-22 PROCEDURE — 25000003 PHARM REV CODE 250: Performed by: STUDENT IN AN ORGANIZED HEALTH CARE EDUCATION/TRAINING PROGRAM

## 2022-07-22 PROCEDURE — 36561 INSERT TUNNELED CV CATH: CPT | Mod: RT | Performed by: STUDENT IN AN ORGANIZED HEALTH CARE EDUCATION/TRAINING PROGRAM

## 2022-07-22 PROCEDURE — 99152 MOD SED SAME PHYS/QHP 5/>YRS: CPT | Performed by: STUDENT IN AN ORGANIZED HEALTH CARE EDUCATION/TRAINING PROGRAM

## 2022-07-22 PROCEDURE — 76937 US GUIDE VASCULAR ACCESS: CPT | Mod: TC | Performed by: STUDENT IN AN ORGANIZED HEALTH CARE EDUCATION/TRAINING PROGRAM

## 2022-07-22 PROCEDURE — 77001 FLUOROGUIDE FOR VEIN DEVICE: CPT | Mod: TC | Performed by: STUDENT IN AN ORGANIZED HEALTH CARE EDUCATION/TRAINING PROGRAM

## 2022-07-22 PROCEDURE — 99153 MOD SED SAME PHYS/QHP EA: CPT | Performed by: STUDENT IN AN ORGANIZED HEALTH CARE EDUCATION/TRAINING PROGRAM

## 2022-07-22 PROCEDURE — 63600175 PHARM REV CODE 636 W HCPCS: Performed by: STUDENT IN AN ORGANIZED HEALTH CARE EDUCATION/TRAINING PROGRAM

## 2022-07-22 DEVICE — POWERPORT CLEARVUE IMPLANTABLE PORT WITH ATTACHABLE 8F POLYURETHANE OPEN-ENDED SINGLE-LUMEN VENOUS CATHETER INTERMEDIATE KIT
Type: IMPLANTABLE DEVICE | Site: CHEST  WALL | Status: FUNCTIONAL
Brand: POWERPORT CLEARVUE

## 2022-07-22 RX ORDER — FENTANYL CITRATE 50 UG/ML
INJECTION, SOLUTION INTRAMUSCULAR; INTRAVENOUS
Status: DISCONTINUED | OUTPATIENT
Start: 2022-07-22 | End: 2022-07-22 | Stop reason: HOSPADM

## 2022-07-22 RX ORDER — CEFAZOLIN SODIUM 1 G/3ML
INJECTION, POWDER, FOR SOLUTION INTRAMUSCULAR; INTRAVENOUS
Status: DISCONTINUED | OUTPATIENT
Start: 2022-07-22 | End: 2022-07-22 | Stop reason: HOSPADM

## 2022-07-22 RX ORDER — ONDANSETRON 2 MG/ML
4 INJECTION INTRAMUSCULAR; INTRAVENOUS ONCE
Status: DISCONTINUED | OUTPATIENT
Start: 2022-07-22 | End: 2022-07-22 | Stop reason: HOSPADM

## 2022-07-22 RX ORDER — LIDOCAINE HYDROCHLORIDE 20 MG/ML
INJECTION, SOLUTION INFILTRATION; PERINEURAL
Status: DISCONTINUED | OUTPATIENT
Start: 2022-07-22 | End: 2022-07-22 | Stop reason: HOSPADM

## 2022-07-22 RX ORDER — HEPARIN SODIUM 1000 [USP'U]/ML
INJECTION, SOLUTION INTRAVENOUS; SUBCUTANEOUS
Status: DISCONTINUED | OUTPATIENT
Start: 2022-07-22 | End: 2022-07-22 | Stop reason: HOSPADM

## 2022-07-22 RX ORDER — MIDAZOLAM HYDROCHLORIDE 1 MG/ML
INJECTION, SOLUTION INTRAMUSCULAR; INTRAVENOUS
Status: DISCONTINUED | OUTPATIENT
Start: 2022-07-22 | End: 2022-07-22 | Stop reason: HOSPADM

## 2022-07-22 NOTE — PLAN OF CARE
Pt being discharged home in stable condition. IV removed, catheter intact, pt tolerated well. Mediport dressing CDI at time of discharge. PT able to ambulate, eat and drink without complication at time of discharge. VSS. Discharge instructions given to pt, pt verbalized understanding.

## 2022-07-22 NOTE — Clinical Note
The right chest and right neck was prepped. The site was prepped with ChloraPrep. The site was clipped. The patient was draped. The patient was positioned supine.

## 2022-07-22 NOTE — DISCHARGE INSTRUCTIONS
Port Placement Discharge Instructions    Due to sedation, do not drive for 24 hrs. Have a responsible adult assist you over 8 hrs post sedation.  You may start taking showers 48 hours after your procedure, no tub baths for 1 week.  Avoid heavy lifting for 2 days after your procedure.  Wound care: The dressing over your port site may be removed after 3-4 days unless instructed otherwise. You may gently wash area daily with mild soap and water. Pat dry. Cover with sterile dressing or bandaid for 7 to 10 days or until site is healed.  If a skin adhesive was used, do not use antibiotic ointment as it can break down the adhesive that is serving as a bandage to hold incision closed.  Observe for and report any signs or symptoms of infection including:          fever greater than 100.5                                   redness, swellling or drainage at the site                     red streaking or pain at port site           Nozin Instructions  Goal: the goal of Nozin is to reduce the risk of post-procedural infections by bacteria in the nasal cavity. Think of it as hand  for your nose.    How to use:    1. Shake Nozin bottle well    2. Take a cotton swab and apply 4 drops to one tip    3. Insert cotton tip into one nostril, being sure not to go deeper into nose than tip of the swab.    4. Swab front pocket of nostril 6 times counterclockwise and 6 times clockwise.   5. Take swab out and apply 2 drops to the same cotton tip. Repeat steps 3 and 4 in the other nostril.        Do steps 1-5 twice a day for 7 days.

## 2022-07-22 NOTE — Clinical Note
dry, intact, no bleeding and no hematoma. Incision well approximated, dermabond applied and dressed with aquacell. Percutaneous stick dressed with steristrips and tegaderm

## 2022-07-22 NOTE — DISCHARGE SUMMARY
Interventional Radiology Post-Procedure Note    Pre Op Diagnosis: Metastatic renal cell carcinoma    Post Op Diagnosis: Same    Procedure: Port placement    Procedure performed by: Sara Delarosa MD    Written Informed Consent Obtained: Yes    Specimen Removed: No    Estimated Blood Loss: Minimal    Findings:   Using realtime U/S guidance a 8 Fr port catheter was placed into the right internal jugular vein with tip of the catheter in the superior cavoatrial junction. A pocket was made in the upper chest wall, and a port was placed. The skin was sutured closed over the port.    Port is ready for use.     Sara Delarosa MD  Interventional Radiology

## 2022-07-23 NOTE — ASSESSMENT & PLAN NOTE
Metastatic renal cell carcinoma. Reviewed pathology report from right lung biopsy. MRI of the brain revealed an area of enhancement in the left frontal bone extending into the soft tissue concerning for metastatic focus.     She understands that her disease is incurable but can be treated.      Regimen:   A cycle is every 21 days:  ? Axitinib   (Inlyta)  5 mg flat dose orally twice daily on days 1 through 21  ? Pembrolizumab   (Keytruda)  200 mg flat dose IV once on day 1 for up to 35 cycles (N Engl. J Med 2019;380(12):8064-1050)     2D-ECHO from 7/19/2022 showed EF of 60%.      Reviewed labs, no concerning cytopenia, will proceed with C1D1. RTC in 3 weeks with labs.

## 2022-07-25 ENCOUNTER — OFFICE VISIT (OUTPATIENT)
Dept: HEMATOLOGY/ONCOLOGY | Facility: CLINIC | Age: 67
End: 2022-07-25
Payer: MEDICARE

## 2022-07-25 ENCOUNTER — INFUSION (OUTPATIENT)
Dept: INFUSION THERAPY | Facility: HOSPITAL | Age: 67
End: 2022-07-25
Attending: INTERNAL MEDICINE
Payer: MEDICARE

## 2022-07-25 ENCOUNTER — PATIENT MESSAGE (OUTPATIENT)
Dept: FAMILY MEDICINE | Facility: CLINIC | Age: 67
End: 2022-07-25
Payer: MEDICARE

## 2022-07-25 VITALS
RESPIRATION RATE: 16 BRPM | HEART RATE: 73 BPM | TEMPERATURE: 98 F | OXYGEN SATURATION: 95 % | DIASTOLIC BLOOD PRESSURE: 68 MMHG | SYSTOLIC BLOOD PRESSURE: 132 MMHG

## 2022-07-25 DIAGNOSIS — E86.0 DEHYDRATION: ICD-10-CM

## 2022-07-25 DIAGNOSIS — Z79.899 IMMUNODEFICIENCY DUE TO CHEMOTHERAPY: Primary | ICD-10-CM

## 2022-07-25 DIAGNOSIS — D84.821 IMMUNODEFICIENCY DUE TO CHEMOTHERAPY: Primary | ICD-10-CM

## 2022-07-25 DIAGNOSIS — T45.1X5A IMMUNODEFICIENCY DUE TO CHEMOTHERAPY: Primary | ICD-10-CM

## 2022-07-25 DIAGNOSIS — C64.2 MALIGNANT NEOPLASM OF LEFT KIDNEY: ICD-10-CM

## 2022-07-25 DIAGNOSIS — Z03.818 ENCNTR FOR OBS FOR SUSP EXPSR TO OTH BIOLG AGENTS RULED OUT: Primary | ICD-10-CM

## 2022-07-25 LAB — SARS-COV-2 RDRP RESP QL NAA+PROBE: NEGATIVE

## 2022-07-25 PROCEDURE — 99215 OFFICE O/P EST HI 40 MIN: CPT | Mod: 25,95,, | Performed by: INTERNAL MEDICINE

## 2022-07-25 PROCEDURE — 3044F PR MOST RECENT HEMOGLOBIN A1C LEVEL <7.0%: ICD-10-PCS | Mod: CPTII,95,, | Performed by: INTERNAL MEDICINE

## 2022-07-25 PROCEDURE — 25000003 PHARM REV CODE 250: Performed by: INTERNAL MEDICINE

## 2022-07-25 PROCEDURE — 96413 CHEMO IV INFUSION 1 HR: CPT

## 2022-07-25 PROCEDURE — 99215 PR OFFICE/OUTPT VISIT, EST, LEVL V, 40-54 MIN: ICD-10-PCS | Mod: 25,95,, | Performed by: INTERNAL MEDICINE

## 2022-07-25 PROCEDURE — 3044F HG A1C LEVEL LT 7.0%: CPT | Mod: CPTII,95,, | Performed by: INTERNAL MEDICINE

## 2022-07-25 PROCEDURE — 63600175 PHARM REV CODE 636 W HCPCS: Performed by: INTERNAL MEDICINE

## 2022-07-25 PROCEDURE — U0002 COVID-19 LAB TEST NON-CDC: HCPCS | Performed by: INTERNAL MEDICINE

## 2022-07-25 RX ORDER — DIPHENHYDRAMINE HYDROCHLORIDE 50 MG/ML
50 INJECTION INTRAMUSCULAR; INTRAVENOUS ONCE AS NEEDED
Status: DISCONTINUED | OUTPATIENT
Start: 2022-07-25 | End: 2022-07-25 | Stop reason: HOSPADM

## 2022-07-25 RX ORDER — HEPARIN 100 UNIT/ML
500 SYRINGE INTRAVENOUS
Status: CANCELLED | OUTPATIENT
Start: 2022-07-25

## 2022-07-25 RX ORDER — HEPARIN 100 UNIT/ML
500 SYRINGE INTRAVENOUS
Status: DISCONTINUED | OUTPATIENT
Start: 2022-07-25 | End: 2022-07-25 | Stop reason: HOSPADM

## 2022-07-25 RX ORDER — DIPHENHYDRAMINE HYDROCHLORIDE 50 MG/ML
50 INJECTION INTRAMUSCULAR; INTRAVENOUS ONCE AS NEEDED
Status: CANCELLED | OUTPATIENT
Start: 2022-07-25

## 2022-07-25 RX ORDER — EPINEPHRINE 0.3 MG/.3ML
0.3 INJECTION SUBCUTANEOUS ONCE AS NEEDED
Status: CANCELLED | OUTPATIENT
Start: 2022-07-25

## 2022-07-25 RX ORDER — SODIUM CHLORIDE 0.9 % (FLUSH) 0.9 %
10 SYRINGE (ML) INJECTION
Status: CANCELLED | OUTPATIENT
Start: 2022-07-25

## 2022-07-25 RX ORDER — EPINEPHRINE 0.3 MG/.3ML
0.3 INJECTION SUBCUTANEOUS ONCE AS NEEDED
Status: DISCONTINUED | OUTPATIENT
Start: 2022-07-25 | End: 2022-07-25 | Stop reason: HOSPADM

## 2022-07-25 RX ADMIN — SODIUM CHLORIDE: 9 INJECTION, SOLUTION INTRAVENOUS at 10:07

## 2022-07-25 RX ADMIN — HEPARIN 500 UNITS: 100 SYRINGE at 11:07

## 2022-07-25 RX ADMIN — SODIUM CHLORIDE 200 MG: 9 INJECTION, SOLUTION INTRAVENOUS at 10:07

## 2022-07-25 NOTE — PLAN OF CARE
Discussed plan of care with pt. Addressed any and ongoing concerns. Pt denies    Problem: Adult Inpatient Plan of Care  Goal: Plan of Care Review  7/25/2022 1242 by Kristen Enriquez RN  Outcome: Ongoing, Progressing  7/25/2022 1241 by Kristen Enriquez RN  Outcome: Ongoing, Progressing  Goal: Patient-Specific Goal (Individualized)  7/25/2022 1242 by Kristen Enriquez RN  Outcome: Ongoing, Progressing  7/25/2022 1241 by Kristen Enriquez RN  Outcome: Ongoing, Progressing  Goal: Absence of Hospital-Acquired Illness or Injury  7/25/2022 1242 by Kristen Enriquez RN  Outcome: Ongoing, Progressing  7/25/2022 1241 by Kristen Enriquez RN  Outcome: Ongoing, Progressing  Intervention: Identify and Manage Fall Risk  Flowsheets (Taken 7/25/2022 1238)  Safety Promotion/Fall Prevention: in recliner, wheels locked  Intervention: Prevent Infection  Flowsheets (Taken 7/25/2022 1238)  Infection Prevention:   hand hygiene promoted   equipment surfaces disinfected  Goal: Optimal Comfort and Wellbeing  7/25/2022 1242 by Kristen Enriquez RN  Outcome: Ongoing, Progressing  7/25/2022 1241 by Kristen Enriquez RN  Outcome: Ongoing, Progressing  Goal: Readiness for Transition of Care  7/25/2022 1242 by Kristen Enriquez RN  Outcome: Ongoing, Progressing  7/25/2022 1241 by Kristen Enriquez RN  Outcome: Ongoing, Progressing     Problem: Fall Injury Risk  Goal: Absence of Fall and Fall-Related Injury  7/25/2022 1242 by Kristen Enriquez RN  Outcome: Ongoing, Progressing  7/25/2022 1241 by Kristen Enriquez RN  Outcome: Ongoing, Progressing  Intervention: Promote Injury-Free Environment  Flowsheets (Taken 7/25/2022 1241)  Safety Promotion/Fall Prevention: in recliner, wheels locked

## 2022-07-25 NOTE — TELEPHONE ENCOUNTER
"I have signed for the following orders AND/OR meds.  Please call the patient and ask the patient to schedule the testing AND/OR inform about any medications that were sent. Medications have been sent to pharmacy listed below      Orders Placed This Encounter   Procedures    WHEELCHAIR FOR HOME USE     Order Specific Question:   Hours in W/C per day:     Answer:   8     Order Specific Question:   Type of Wheelchair:     Answer:   Standard     Order Specific Question:   Size(Width):     Answer:   18"(STD adult)     Order Specific Question:   Leg Support:     Answer:   STD footrests     Order Specific Question:   Lap Belt:     Answer:   Velcro     Order Specific Question:   Accessories:     Answer:   Front brakes     Order Specific Question:   Cushion:     Answer:   Basic     Order Specific Question:   Height:     Answer:   5'6     Order Specific Question:   Weight:     Answer:   130     Order Specific Question:   Length of need (1-99 months):     Answer:   99     Order Specific Question:   Please check all that apply:     Answer:   Caregiver is capable and willing to operate wheelchair safely.     Order Specific Question:   Please check all that apply:     Answer:   Patient's upper body strength is sufficient for propulsion.              [id number]  [place] [address] [facility]  [address] [place] [address]  Phone: [phone] Fax: [phone]    "

## 2022-07-25 NOTE — PROGRESS NOTES
Subjective:   Date of Visit: 7/25/22   ?   ?    REFERRING PROVIDER: No referring provider defined for this encounter.   ?   CHIEF COMPLAINT: metastatic renal cell carcinoma???????   ?   ONCOLOGIC DIAGNOSIS: Stage IV renal cell carcinoma  ?   CURRENT TREATMENT:  Keytruda plus axitinib    PAST TREATMENT:  None  ?   ONCOLOGIC HISTORY:     PET scan:  05/17/2022  Chest:     There are innumerable markedly hypermetabolic nodules scattered throughout both lungs highly suspicious for metastatic disease.  A large coalescent mass of nodules in the superior segment of the right lower lobe on image 72 measures 5.0 x 4.4 cm with a max SUV of 12.4.  One of the larger nodules in the left lung in the anterior aspect of the left upper lobe on image 59 measures 2.7 x 2.7 cm with a max SUV of 8.6.  There is extensive hypermetabolic lymphadenopathy with hypermetabolic enlarged nodes in the right paratracheal region, precarinal region, subcarinal region, and bilateral hilar regions.  A precarinal node on image 70 measures 4.8 x 3.9 cm with a max SUV of 7.4.  A subcarinal node on image 82 measures 7.8 x 3.7 cm with a max SUV of 8.1.  No pleural or pericardial effusion are present.     Abdomen/pelvis:     There is a large hypermetabolic mass arising from the lower pole of the left kidney highly suspicious for renal cell carcinoma.  There is central necrosis within this mass.  The mass appears to involve at least a portion of the left renal hilum but does not appear to extend into the left renal vein.  The mass measures 7.5 x 6.8 x 6.0 cm and has a max SUV of 13.0.  There is an adjacent hypermetabolic periaortic retroperitoneal lymph node consistent with neoplastic adenopathy.  On image 139 the node measures 3.4 x 2.8 cm with a max SUV of 6.6.  The adrenal glands are normal.  No ascites is present.  There is a small fat containing umbilical hernia.     Skeleton:     There is diffuse nonfocal hypermetabolism of the bone marrow  predominantly in the axial skeleton of uncertain significance.  This pattern can be seen with various blood dyscrasias and in smokers, but the possibility of diffuse osseous metastatic disease cannot be excluded.     Impression:     1. Large hypermetabolic lower pole left renal mass highly suspicious for renal cell carcinoma.  There is an adjacent enlarged hypermetabolic retroperitoneal lymph node highly suspicious for neoplastic adenopathy.  2. Innumerable markedly hypermetabolic bilateral pulmonary nodules consistent with metastatic disease.  There is also extensive hypermetabolic mediastinal and bilateral pulmonary hilar lymph lymphadenopathy consistent with metastatic disease.  3. Diffuse nonfocal hypermetabolism predominantly within the axial skeleton of uncertain significance.  This pattern can be seen with various blood dyscrasias and in smokers, but the possibility of diffuse osseous metastatic disease cannot be excluded.       HPI:   67-year-old female with history of hyperlipidemia, hypertension, was referred to us by Dr. Rosario due to recently noted abnormal PET scan.  Interestingly patient has recently complained of unintentional weight loss and persistent cough.  Chest x-ray showed multiple pulmonary nodules suspicious for metastasis.  This necessitated PET-CT scan that revealed findings stated above.    Today she continues to complain weight loss of more than 20 lb over the past 6 months, shortness of breath, persistent nonproductive cough. Also had 2 episodes of hematuria. She denies fever, chills or flank pain.    Denies smoking or alcohol use.  No pertinent family history.    Interval Hx: 66 yo female with metastatic renal cell carcinoma presents today for C1D1 of chemo/immunotherapy with keytruda and axitinib. ECOG 2    Review of Systems   Constitutional: Positive for fatigue. Negative for activity change, appetite change, chills, fever and unexpected weight change.   HENT: Negative for hearing  loss, mouth sores, nosebleeds, sore throat, tinnitus, trouble swallowing and voice change.    Eyes: Negative for visual disturbance.   Respiratory: Negative for cough, chest tightness and shortness of breath.    Cardiovascular: Negative for chest pain, palpitations and leg swelling.   Gastrointestinal: Negative for abdominal pain, anal bleeding, blood in stool, constipation, diarrhea, nausea and vomiting.   Genitourinary: Negative for dysuria, frequency, hematuria, pelvic pain, vaginal bleeding and vaginal pain.   Musculoskeletal: Positive for back pain. Negative for arthralgias, joint swelling and neck pain.   Skin: Negative for color change, pallor, rash and wound.   Allergic/Immunologic: Negative for immunocompromised state.   Neurological: Positive for weakness. Negative for dizziness, tremors, syncope, speech difficulty, light-headedness and headaches.   Hematological: Negative for adenopathy. Does not bruise/bleed easily.   Psychiatric/Behavioral: Negative for agitation, confusion, decreased concentration, hallucinations and sleep disturbance. The patient is not nervous/anxious.        ?   PAST MEDICAL HISTORY:   Past Medical History:   Diagnosis Date    Hyperlipidemia     Hypertension     Stroke     ?     PAST SURGICAL HISTORY:   Past Surgical History:   Procedure Laterality Date    BRONCHOSCOPY Bilateral 6/6/2022    Procedure: Bronchoscopy - insert lighted tube into airway to take a biopsy of lung;  Surgeon: Michi Ceron MD;  Location: [facility] ENDO;  Service: Endoscopy;  Laterality: Bilateral;    ENDOBRONCHIAL ULTRASOUND Bilateral 6/6/2022    Procedure: ENDOBRONCHIAL ULTRASOUND (EBUS);  Surgeon: Michi Ceron MD;  Location: [facility] ENDO;  Service: Endoscopy;  Laterality: Bilateral;    FLUOROSCOPY N/A 7/22/2022    Procedure: FLUOROSCOPY/Mediport placement;  Surgeon: Sara Delarosa MD;  Location: [facility] CATH LAB;  Service: General;  Laterality: N/A;    TONSILLECTOMY        ?   ALLERGIES:    Allergies as of 07/25/2022    (No Known Allergies)      ?   MEDICATIONS:?   No outpatient medications have been marked as taking for the 7/25/22 encounter (Office Visit) with Ghassan Lee MD.      ?   SOCIAL HISTORY:?   Social History     Tobacco Use    Smoking status: Never Smoker    Smokeless tobacco: Never Used   Substance Use Topics    Alcohol use: No     Alcohol/week: 0.0 standard drinks        ?   FAMILY HISTORY:   family history includes Stroke in her father, mother, and sister.   ?     Objective:      Physical Exam  Unable to assess  ?   There were no vitals filed for this visit.   ?     ECOG SCORE           ?   Laboratory:  ?   Lab Visit on 07/25/2022   Component Date Value Ref Range Status    WBC 07/25/2022 10.60  3.90 - 12.70 K/uL Final    RBC 07/25/2022 5.63 (A) 4.00 - 5.40 M/uL Final    Hemoglobin 07/25/2022 13.7  12.0 - 16.0 g/dL Final    Hematocrit 07/25/2022 46.4  37.0 - 48.5 % Final    MCV 07/25/2022 82  82 - 98 fL Final    MCH 07/25/2022 24.3 (A) 27.0 - 31.0 pg Final    MCHC 07/25/2022 29.5 (A) 32.0 - 36.0 g/dL Final    RDW 07/25/2022 19.0 (A) 11.5 - 14.5 % Final    Platelets 07/25/2022 454 (A) 150 - 450 K/uL Final    MPV 07/25/2022 9.0 (A) 9.2 - 12.9 fL Final    Immature Granulocytes 07/25/2022 0.5  0.0 - 0.5 % Final    Gran # (ANC) 07/25/2022 7.9 (A) 1.8 - 7.7 K/uL Final    Immature Grans (Abs) 07/25/2022 0.05 (A) 0.00 - 0.04 K/uL Final    Lymph # 07/25/2022 1.4  1.0 - 4.8 K/uL Final    Mono # 07/25/2022 0.7  0.3 - 1.0 K/uL Final    Eos # 07/25/2022 0.4  0.0 - 0.5 K/uL Final    Baso # 07/25/2022 0.11  0.00 - 0.20 K/uL Final    nRBC 07/25/2022 0  0 /100 WBC Final    Gran % 07/25/2022 74.3 (A) 38.0 - 73.0 % Final    Lymph % 07/25/2022 13.1 (A) 18.0 - 48.0 % Final    Mono % 07/25/2022 7.0  4.0 - 15.0 % Final    Eosinophil % 07/25/2022 4.1  0.0 - 8.0 % Final    Basophil % 07/25/2022 1.0  0.0 - 1.9 % Final    Differential Method 07/25/2022 Automated   Final      ?    Tumor markers   ?   ?   Imaging: Cardiac catheterization  Procedure performed in the Invasive Lab    - See Procedure Log link below for nursing documentation    - See OpNote on Surgeries Tab for physician findings    - See Imaging Tab for radiologist dictation     ?      Pathology:  Pathology Results  (Last 10 years)               06/30/22 0928  Specimen to Pathology, Radiology Lung, biopsy not tranplant (Abnormal) Final result    Narrative:  Release to patient->Immediate       06/06/22 1423  Specimen to Pathology, Surgery Pulmonary and Thoracic Final result    Narrative:  Pre-op Diagnosis: Abnormal PET scan, lung [R94.2]   Malignant neoplasm of left kidney [C64.2]   Procedure(s):   Bronchoscopy - insert lighted tube into airway to take a   biopsy of lung   ENDOBRONCHIAL ULTRASOUND (EBUS)   Number of specimens: 3   Name of specimens:   1-RLL Bx   2-RML Bx   3-RUL bx   Which provider would you like to cc?->JAZMYN TORRES   Release to patient->Immediate   Specimen total (fresh, frozen, permanent):->3              ?   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 Malignant neoplasm of left kidney    3. Dehydration          Malignant neoplasm of left kidney  Metastatic renal cell carcinoma. Reviewed pathology report from right lung biopsy. MRI of the brain revealed an area of enhancement in the left frontal bone extending into the soft tissue concerning for metastatic focus.     She understands that her disease is incurable but can be treated.      Regimen:   A cycle is every 21 days:  ? Axitinib   (Inlyta)  5 mg flat dose orally twice daily on days 1 through 21  ? Pembrolizumab   (Keytruda)  200 mg flat dose IV once on day 1 for up to 35 cycles (N Engl. J Med 2019;380(12):8553-5544)     2D-ECHO from 7/19/2022 showed EF of 60%.      Reviewed labs, no concerning cytopenia, will proceed with C1D1. RTC in 3 weeks with labs.     Acute cystitis: currently on abx course      ?Immunodeficiency due to  chemotherapy  -     CBC Auto Differential; Future; Expected date: 07/25/2022  -     Comprehensive Metabolic Panel; Future; Expected date: 07/25/2022  -     TSH; Future; Expected date: 07/25/2022    Malignant neoplasm of left kidney  -     CBC Auto Differential; Future; Expected date: 07/25/2022  -     Comprehensive Metabolic Panel; Future; Expected date: 07/25/2022  -     TSH; Future; Expected date: 07/25/2022    Dehydration  -     CBC Auto Differential; Future; Expected date: 07/25/2022  -     Comprehensive Metabolic Panel; Future; Expected date: 07/25/2022  -     TSH; Future; Expected date: 07/25/2022      Follow-Up: No follow-ups on file.    Consult Start Time: 07/25/2022 08:40 CDT  Consult End Time: 07/25/2022 09:00 CDT      The patient location is: [facility]  The chief complaint leading to consultation is: Renal cell cancer    Visit type: audiovisual    Face to Face time with patient: 20 minutes  20 minutes of total time spent on the encounter, which includes face to face time and non-face to face time preparing to see the patient (eg, review of tests), Obtaining and/or reviewing separately obtained history, Documenting clinical information in the electronic or other health record, Independently interpreting results (not separately reported) and communicating results to the patient/family/caregiver, or Care coordination (not separately reported).       Each patient to whom he or she provides medical services by telemedicine is:  (1) informed of the relationship between the physician and patient and the respective role of any other health care provider with respect to management of the patient; and (2) notified that he or she may decline to receive medical services by telemedicine and may withdraw from such care at any time.    KAITLIN BOGGS Md., Ph.D  Hematology & Oncology Department  Phone #: [phone]

## 2022-07-27 ENCOUNTER — PATIENT MESSAGE (OUTPATIENT)
Dept: HEMATOLOGY/ONCOLOGY | Facility: CLINIC | Age: 67
End: 2022-07-27
Payer: MEDICARE

## 2022-08-03 ENCOUNTER — PATIENT MESSAGE (OUTPATIENT)
Dept: FAMILY MEDICINE | Facility: CLINIC | Age: 67
End: 2022-08-03
Payer: MEDICARE

## 2022-08-04 ENCOUNTER — TELEPHONE (OUTPATIENT)
Dept: FAMILY MEDICINE | Facility: CLINIC | Age: 67
End: 2022-08-04
Payer: MEDICARE

## 2022-08-04 NOTE — TELEPHONE ENCOUNTER
Pt was given handicapped tag from Dr. Rosario last week. The DMV states that pt must be present in order to receive tag. Stated that if pt is unable to go to the DMV, they need a written statement documenting hat pt has stage 4 cancer and is unable to leave home.

## 2022-08-05 NOTE — TELEPHONE ENCOUNTER
I'm ok with a letter being drafted stating that she is suffering from metastatic cancer and cannot be physically present for DMV visit and request that  obtain handicap permit in her place.

## 2022-08-08 ENCOUNTER — SPECIALTY PHARMACY (OUTPATIENT)
Dept: PHARMACY | Facility: CLINIC | Age: 67
End: 2022-08-08
Payer: MEDICARE

## 2022-08-08 NOTE — TELEPHONE ENCOUNTER
----- Message from Martina Garza sent at 8/4/2022  9:26 AM CDT -----  Contact:   Request a return call on this pt handicap pass, dmv needs a letter stating the pt has stage 4 cancer and can't come in at this time, no additional info given and can be reached at [phone]///[id number]     Besponsa C2 D15 infusion complete and tolerated well. PAC flushed with NS, positive blood rtn, hep locked and de accessed. Pt d/c home with family member. Ambulated away from unit with walker, steady gait, NAD to lobby.

## 2022-08-08 NOTE — TELEPHONE ENCOUNTER
Yes please write a letter stating that the patient is unable to leave their home due to their current illness.

## 2022-08-08 NOTE — TELEPHONE ENCOUNTER
Outgoing call to pt for Inlyta refill. Per pts , pt started Inlyta 7/25, so she has about 2 weeks on hand. Pending refill as appropriate.

## 2022-08-09 ENCOUNTER — TELEPHONE (OUTPATIENT)
Dept: PALLIATIVE MEDICINE | Facility: CLINIC | Age: 67
End: 2022-08-09
Payer: MEDICARE

## 2022-08-09 ENCOUNTER — TELEPHONE (OUTPATIENT)
Dept: INFUSION THERAPY | Facility: HOSPITAL | Age: 67
End: 2022-08-09

## 2022-08-09 ENCOUNTER — OFFICE VISIT (OUTPATIENT)
Dept: PALLIATIVE MEDICINE | Facility: CLINIC | Age: 67
End: 2022-08-09
Payer: MEDICARE

## 2022-08-09 VITALS
BODY MASS INDEX: 26.08 KG/M2 | HEIGHT: 66 IN | SYSTOLIC BLOOD PRESSURE: 127 MMHG | RESPIRATION RATE: 16 BRPM | DIASTOLIC BLOOD PRESSURE: 78 MMHG | HEART RATE: 83 BPM | TEMPERATURE: 98 F | OXYGEN SATURATION: 97 % | WEIGHT: 162.25 LBS

## 2022-08-09 DIAGNOSIS — R19.7 DIARRHEA, UNSPECIFIED TYPE: ICD-10-CM

## 2022-08-09 DIAGNOSIS — Z51.5 PALLIATIVE CARE ENCOUNTER: ICD-10-CM

## 2022-08-09 DIAGNOSIS — C64.2 MALIGNANT NEOPLASM OF LEFT KIDNEY: ICD-10-CM

## 2022-08-09 DIAGNOSIS — G89.3 NEOPLASM RELATED PAIN: Primary | ICD-10-CM

## 2022-08-09 PROCEDURE — 3288F FALL RISK ASSESSMENT DOCD: CPT | Mod: CPTII,S$GLB,, | Performed by: PHYSICIAN ASSISTANT

## 2022-08-09 PROCEDURE — 99999 PR PBB SHADOW E&M-EST. PATIENT-LVL V: ICD-10-PCS | Mod: PBBFAC,,, | Performed by: PHYSICIAN ASSISTANT

## 2022-08-09 PROCEDURE — 1123F PR ADV CARE PLAN DISCUSSED, PLAN OR SURROGATE DOCUMENTED: ICD-10-PCS | Mod: CPTII,S$GLB,, | Performed by: PHYSICIAN ASSISTANT

## 2022-08-09 PROCEDURE — 3078F PR MOST RECENT DIASTOLIC BLOOD PRESSURE < 80 MM HG: ICD-10-PCS | Mod: CPTII,S$GLB,, | Performed by: PHYSICIAN ASSISTANT

## 2022-08-09 PROCEDURE — 99205 OFFICE O/P NEW HI 60 MIN: CPT | Mod: S$GLB,,, | Performed by: PHYSICIAN ASSISTANT

## 2022-08-09 PROCEDURE — 1159F PR MEDICATION LIST DOCUMENTED IN MEDICAL RECORD: ICD-10-PCS | Mod: CPTII,S$GLB,, | Performed by: PHYSICIAN ASSISTANT

## 2022-08-09 PROCEDURE — 1101F PR PT FALLS ASSESS DOC 0-1 FALLS W/OUT INJ PAST YR: ICD-10-PCS | Mod: CPTII,S$GLB,, | Performed by: PHYSICIAN ASSISTANT

## 2022-08-09 PROCEDURE — 3044F HG A1C LEVEL LT 7.0%: CPT | Mod: CPTII,S$GLB,, | Performed by: PHYSICIAN ASSISTANT

## 2022-08-09 PROCEDURE — 1123F ACP DISCUSS/DSCN MKR DOCD: CPT | Mod: CPTII,S$GLB,, | Performed by: PHYSICIAN ASSISTANT

## 2022-08-09 PROCEDURE — 3288F PR FALLS RISK ASSESSMENT DOCUMENTED: ICD-10-PCS | Mod: CPTII,S$GLB,, | Performed by: PHYSICIAN ASSISTANT

## 2022-08-09 PROCEDURE — 3074F PR MOST RECENT SYSTOLIC BLOOD PRESSURE < 130 MM HG: ICD-10-PCS | Mod: CPTII,S$GLB,, | Performed by: PHYSICIAN ASSISTANT

## 2022-08-09 PROCEDURE — 1101F PT FALLS ASSESS-DOCD LE1/YR: CPT | Mod: CPTII,S$GLB,, | Performed by: PHYSICIAN ASSISTANT

## 2022-08-09 PROCEDURE — 3044F PR MOST RECENT HEMOGLOBIN A1C LEVEL <7.0%: ICD-10-PCS | Mod: CPTII,S$GLB,, | Performed by: PHYSICIAN ASSISTANT

## 2022-08-09 PROCEDURE — 99497 PR ADVNCD CARE PLAN 30 MIN: ICD-10-PCS | Mod: S$GLB,,, | Performed by: PHYSICIAN ASSISTANT

## 2022-08-09 PROCEDURE — 3074F SYST BP LT 130 MM HG: CPT | Mod: CPTII,S$GLB,, | Performed by: PHYSICIAN ASSISTANT

## 2022-08-09 PROCEDURE — 1160F RVW MEDS BY RX/DR IN RCRD: CPT | Mod: CPTII,S$GLB,, | Performed by: PHYSICIAN ASSISTANT

## 2022-08-09 PROCEDURE — 1160F PR REVIEW ALL MEDS BY PRESCRIBER/CLIN PHARMACIST DOCUMENTED: ICD-10-PCS | Mod: CPTII,S$GLB,, | Performed by: PHYSICIAN ASSISTANT

## 2022-08-09 PROCEDURE — 3008F BODY MASS INDEX DOCD: CPT | Mod: CPTII,S$GLB,, | Performed by: PHYSICIAN ASSISTANT

## 2022-08-09 PROCEDURE — 3008F PR BODY MASS INDEX (BMI) DOCUMENTED: ICD-10-PCS | Mod: CPTII,S$GLB,, | Performed by: PHYSICIAN ASSISTANT

## 2022-08-09 PROCEDURE — 3078F DIAST BP <80 MM HG: CPT | Mod: CPTII,S$GLB,, | Performed by: PHYSICIAN ASSISTANT

## 2022-08-09 PROCEDURE — 99999 PR PBB SHADOW E&M-EST. PATIENT-LVL V: CPT | Mod: PBBFAC,,, | Performed by: PHYSICIAN ASSISTANT

## 2022-08-09 PROCEDURE — 1125F AMNT PAIN NOTED PAIN PRSNT: CPT | Mod: CPTII,S$GLB,, | Performed by: PHYSICIAN ASSISTANT

## 2022-08-09 PROCEDURE — 99497 ADVNCD CARE PLAN 30 MIN: CPT | Mod: S$GLB,,, | Performed by: PHYSICIAN ASSISTANT

## 2022-08-09 PROCEDURE — 1159F MED LIST DOCD IN RCRD: CPT | Mod: CPTII,S$GLB,, | Performed by: PHYSICIAN ASSISTANT

## 2022-08-09 PROCEDURE — 1125F PR PAIN SEVERITY QUANTIFIED, PAIN PRESENT: ICD-10-PCS | Mod: CPTII,S$GLB,, | Performed by: PHYSICIAN ASSISTANT

## 2022-08-09 PROCEDURE — 99205 PR OFFICE/OUTPT VISIT, NEW, LEVL V, 60-74 MIN: ICD-10-PCS | Mod: S$GLB,,, | Performed by: PHYSICIAN ASSISTANT

## 2022-08-09 RX ORDER — OXYCODONE HYDROCHLORIDE 5 MG/1
TABLET ORAL
Qty: 20 TABLET | Refills: 0 | Status: SHIPPED | OUTPATIENT
Start: 2022-08-09

## 2022-08-09 RX ORDER — NALOXONE HYDROCHLORIDE 4 MG/.1ML
1 SPRAY NASAL ONCE
Qty: 2 EACH | Refills: 0 | Status: SHIPPED | OUTPATIENT
Start: 2022-08-09 | End: 2022-08-10

## 2022-08-09 NOTE — ASSESSMENT & PLAN NOTE
Under the care of Dr. Lee    Worsening L arm and L pelvic pain post Keytruda. Also having diarrhea with weight loss. Recommended ER evaluation for concern of progressive disease or impingement but they adamantly refused.     CT chest/ abd/ pelvis ordered for evaluation but not ideal in light of severe symptoms as this will result in a delay of evaluation.

## 2022-08-09 NOTE — TELEPHONE ENCOUNTER
----- Message from Indiana Higuera sent at 8/9/2022  9:14 AM CDT -----  Spouse call to say pt will be there a 3pm, call to confirmed  appt today [phone]

## 2022-08-09 NOTE — PROGRESS NOTES
Palliative Medicine  Consult  Consult Requested By: Dr. Ghassan Lee  Reason for Consult: Pain management      SUBJECTIVE:     History of Present Illness:  Patient is a 67 y.o. year old female presenting with metastatic RCC currently receiving Keytruda and Inlyta who was referred to PM clinic for pain management. Ms. Montgomery comes to clinic today with her  Supriya. She reports that she is feeling very ill and has not felt well since receiving her first Keytruda infusion 2 weeks ago.  She is also having frequent diarrhea with significant weight loss over the last month. She is restless and not sleeping well at night because she can not find a comfortable position. She is complaining of left arm pain that originates in the shoulder and leaves her unable to lift her arm without assistance. She is also having left hip pain and unable to lift her leg without assistance. She does not have a sensory deficit and seems to be more motor related from either pain or impaired anatomy. I suspect that a metastatic lesion is causing her pain and not as worried about a neurologic injury since it is both arms, onset was gradual, not worsening or improving, and no other symptoms. I recommend she go to the ER for expedited imaging and pain relief and Dr. Lee agrees but they both adamantly refuse. Supriya says that they will not go to the ER to wait 5 hours while she continues to have diarrhea on herself. He wants a direct admission but I explained this is not an option. He asks for another option which I explained would be opioid titration at home, Imodium, and outpatient CT scan. I explained that this could result in a misdiagnosis or delay in treatment if something more serious is going on and he says that ER is not an option. They both seemed frustrated and not necessarily with us but rather is it hard for Supriya to see her in so much pain and fears that the ER will not be a therapeutic environment. She is opioid naive  and does not remember taking hydrocodone in April after a dental procedure. She has been taking APAP (550mg?) BID with little relief. I recommend oxycodone 2.5mg q4hr PRN pain with titration by 2.5mg in subsequent dosing until max 10mg, she achieves relief, or side effects limit titration. They were counseled extensively on the side effects of opioids and warning signs of opioid overdose. I have rx Narcan and instructed how and when to use. I will follow up this week regarding the response to the oxycodone and await repeat scans. Prior to my entry they completed HCPOA and started the code status discussion with our nurse. Ms. Montgomery elected DNR/I saying when it is her time she wants to go but her  asked that we discuss this topic at a later date. As they were leaving both expressed their hope that her pain would be relieved with the pain medicine because her quality of life is so poor right now. I introduced hospice as an option if she is unable to tolerate treatment or the cancer continues to progress. Epps became quiet and tearful and Ms. Montgomery smiled and said she knew this was an option but hard to hear the fear spoken by someone else. We will advance this discussion in subsequent visits.    LA  reviewed and summarized:        Past Medical History:   Diagnosis Date    Hyperlipidemia     Hypertension     Stroke      Past Surgical History:   Procedure Laterality Date    BRONCHOSCOPY Bilateral 6/6/2022    Procedure: Bronchoscopy - insert lighted tube into airway to take a biopsy of lung;  Surgeon: Michi Ceron MD;  Location: [facility];  Service: Endoscopy;  Laterality: Bilateral;    ENDOBRONCHIAL ULTRASOUND Bilateral 6/6/2022    Procedure: ENDOBRONCHIAL ULTRASOUND (EBUS);  Surgeon: Michi Ceron MD;  Location: [facility];  Service: Endoscopy;  Laterality: Bilateral;    FLUOROSCOPY N/A 7/22/2022    Procedure: FLUOROSCOPY/Mediport placement;  Surgeon: Sara Delarosa MD;   Location: [facility] CATH LAB;  Service: General;  Laterality: N/A;    TONSILLECTOMY       Family History   Problem Relation Age of Onset    Stroke Mother     Stroke Father     Stroke Sister      Review of patient's allergies indicates:  No Known Allergies    Medications:    Current Outpatient Medications:     amLODIPine (NORVASC) 10 MG tablet, Take 1 tablet (10 mg total) by mouth once daily., Disp: 90 tablet, Rfl: 1    aspirin (ECOTRIN) 81 MG EC tablet, Take 1 tablet (81 mg total) by mouth once daily., Disp: , Rfl:     atorvastatin (LIPITOR) 80 MG tablet, Take 1 tablet (80 mg total) by mouth once daily., Disp: 90 tablet, Rfl: 1    axitinib (INLYTA) 5 mg Tab, Take 1 tablet (5 mg) by mouth 2 (two) times daily., Disp: 120 tablet, Rfl: 11    carvediloL (COREG) 6.25 MG tablet, TAKE 1 TABLET BY MOUTH TWICE DAILY WITH MEALS, Disp: 180 tablet, Rfl: 0    cetirizine (ZYRTEC) 10 MG tablet, Take 1 tablet (10 mg total) by mouth once daily., Disp: 30 tablet, Rfl: 11    clopidogreL (PLAVIX) 75 mg tablet, Take 1 tablet by mouth once daily, Disp: 90 tablet, Rfl: 0    fluticasone propionate (FLONASE) 50 mcg/actuation nasal spray, Use 2 sprays to each nostril daily, Disp: 16 g, Rfl: 0    hydroCHLOROthiazide (HYDRODIURIL) 25 MG tablet, Take 1 tablet (25 mg total) by mouth once daily., Disp: 90 tablet, Rfl: 1    ondansetron (ZOFRAN-ODT) 8 MG TbDL, Take 1 tablet (8 mg total) by mouth every 12 (twelve) hours as needed., Disp: 90 tablet, Rfl: 1    potassium chloride SA (K-DUR,KLOR-CON) 20 MEQ tablet, Take 1 tablet (20 mEq total) by mouth once daily., Disp: 90 tablet, Rfl: 1    prochlorperazine (COMPAZINE) 5 MG tablet, Take 1 tablet (5 mg total) by mouth every 6 (six) hours as needed., Disp: 90 tablet, Rfl: 1    naloxone (NARCAN) 4 mg/actuation Spry, 1 spray (4 mg total) by Nasal route once. Use if opioid overdose is suspected then immediately seek evaluation at emergency department for 1 dose, Disp: 2 each, Rfl: 0    oxyCODONE  (ROXICODONE) 5 MG immediate release tablet, Take 2.5mg(1/2) every 4 hrs as needed for pain. If ineffective may add 2.5mg(1/2) to next dose for max of 10mg dose every 4 hours as needed., Disp: 20 tablet, Rfl: 0    OBJECTIVE:     ROS:  Review of Systems   Constitutional: Positive for activity change, appetite change and unexpected weight change (weight loss, unintentional.  11# in 1 month).   Respiratory: Negative for cough and shortness of breath.    Cardiovascular: Negative for chest pain and leg swelling.   Genitourinary: Negative for difficulty urinating.   Musculoskeletal: Positive for myalgias (L thigh).   Neurological: Positive for weakness (LLE and LUE- due to weakness). Negative for numbness.       Review of Symptoms    Symptom Assessment (ESAS 0-10 Scale)  Pain:  8  Dyspnea:  6  Anxiety:  0  Nausea:  7  Depression:  0  Anorexia:  0  Fatigue:  10  Insomnia:  10  Restlessness:  10  Agitation:  0     CAM / Delirium:  Negative  Constipation:  Negative  Diarrhea:  Positive      ECOG Performance Status thGthrthathdtheth:th th4th Living Arrangements:  Lives with spouse      Advance Care Planning   Advance Directives:   Medical Power of : Yes      Decision Making:  Family answered questions and Patient answered questions            Physical Exam:  Vitals: Temp: 97.5 °F (36.4 °C) (08/09/22 1508)  Pulse: 83 (08/09/22 1508)  Resp: 16 (08/09/22 1508)  BP: 127/78 (08/09/22 1508)  SpO2: 97 % (08/09/22 1508)  Physical Exam    Labs and radiology data reviewed    ASSESSMENT/PLAN:     Problem List Items Addressed This Visit        Oncology    Malignant neoplasm of left kidney    Current Assessment & Plan     Under the care of Dr. Lee    Worsening L arm and L pelvic pain post Keytruda. Also having diarrhea with weight loss. Recommended ER evaluation for concern of progressive disease or impingement but they adamantly refused.     CT chest/ abd/ pelvis ordered for evaluation but not ideal in light of severe symptoms as this will  result in a delay of evaluation.           Neoplasm related pain - Primary    Overview     Opioid naive. Start oxycodone 2.5mg q4hr PRN pain. Okay to increase by 2.5 mg to max dose 10mg for pain relief.    Recommend ER evaluation but they adamantly refuse. CT ordered for evaluation but will be delayed due to scheduling.                  GI    Diarrhea    Overview     Likely related to Inlyta. No recent abx usage, blood, and cramping. Recommend OTC Immodium              Palliative Care    Palliative care encounter    Overview     HCPOA: 1: Supriya Montgomery-  - [phone], 2: Sintia Galvez - Dgt- Lives next door [phone], 3: Roseann [place]iano[place]fl[place]eg[place] [phone]    Code status: recommend DNR/I- patient elects DNR but  not open to this discussion today.    Introduced hospice as an option if cancer continues to progress or intolerant to treatment.                  Follow up: phone call or [place] [employer] message by the end of the week, 1 month after that      76 minutes total visit  60 minutes of total time spent on the encounter, which includes face to face time and non-face to face time preparing to see the patient (eg, review of tests), Obtaining and/or reviewing separately obtained history, Documenting clinical information in the electronic or other health record, Independently interpreting results (not separately reported) and communicating results to the patient/family/caregiver, or Care coordination (not separately reported).    16 minutes spent in discussing ACP    Signature: Taylor Jordan PA-C

## 2022-08-10 ENCOUNTER — PATIENT MESSAGE (OUTPATIENT)
Dept: PALLIATIVE MEDICINE | Facility: CLINIC | Age: 67
End: 2022-08-10
Payer: MEDICARE

## 2022-08-11 ENCOUNTER — DOCUMENTATION ONLY (OUTPATIENT)
Dept: PALLIATIVE MEDICINE | Facility: CLINIC | Age: 67
End: 2022-08-11
Payer: MEDICARE

## 2022-08-11 PROBLEM — C78.01 MALIGNANT NEOPLASM METASTATIC TO RIGHT LUNG: Status: ACTIVE | Noted: 2022-08-11

## 2022-08-11 NOTE — PROGRESS NOTES
Subjective:       Patient ID: Loyda Montgomery is a 67 y.o. female.    Chief Complaint:   1. Malignant neoplasm of left kidney  Stage IV (cT2a, cN1, pM1)   2. Malignant neoplasm metastatic to right lung       Current Treatment:  OP AXITINIB + PEMBROLIZUMAB 200MG Q3W  INF FLUIDS     Treatment History:    HPI: This is a 67-year-old female with medical history significant for hyperlipidemia, hypertension, and CVA who was referred to us in 5/2022 by Dr. Rosario due to abnormal PET scan.  Interestingly, patient recently complained of unintentional weight loss and persistent cough. Chest x-ray showed multiple pulmonary nodules suspicious for metastasis.  This necessitated PET-CT scan that revealed innumerable markedly hypermetabolic nodules scattered throughout both lungs highly suspicious for metastatic disease.  A large coalescent mass of nodules in the superior segment of the right lower lobe on image 72 measures 5.0 x 4.4 cm with a max SUV of 12.4.  One of the larger nodules in the left lung in the anterior aspect of the left upper lobe on image 59 measures 2.7 x 2.7 cm with a max SUV of 8.6. There is extensive hypermetabolic lymphadenopathy with hypermetabolic enlarged nodes in the right paratracheal region, precarinal region, subcarinal region, and bilateral hilar regions.  A precarinal node on image 70 measures 4.8 x 3.9 cm with a max SUV of 7.4.  A subcarinal node on image 82 measures 7.8 x 3.7 cm with a max SUV of 8.1. No pleural or pericardial effusion are present.    There is a large hypermetabolic mass arising from the lower pole of the left kidney highly suspicious for renal cell carcinoma. There is central necrosis within this mass.  The mass appears to involve at least a portion of the left renal hilum but does not appear to extend into the left renal vein.  The mass measures 7.5 x 6.8 x 6.0 cm and has a max SUV of 13.0.  There is an adjacent hypermetabolic periaortic retroperitoneal lymph node consistent with  neoplastic adenopathy.  On image 139 the node measures 3.4 x 2.8 cm with a max SUV of 6.6.  The adrenal glands are normal.  No ascites is present. There is a small fat containing umbilical hernia.     There is diffuse nonfocal hypermetabolism of the bone marrow predominantly in the axial skeleton of uncertain significance. This pattern can be seen with various blood dyscrasias and in smokers, but the possibility of diffuse osseous metastatic disease cannot be excluded. MRI of the brain revealed an area of enhancement in the left frontal bone extending into the soft tissue concerning for metastatic focus. Lung biopsy was positive for metastatic renal cell carcinoma.     She was started on Keytruda/Inlyta on 7/21/2022.     Her primary Hematologist/Oncologist is Dr. Lee.    Interval History: Patient presents for follow up on Keytruda/Inlyta; She is scheduled to receive C2D1 today. She presents with her  who answers some questions for her. He is concerned about her weight loss; she states she does not experience hunger. She is amenable to trying Megace; will send prescription to her pharmacy per her request. She complains that she is unable to sleep through the night due to coughing; she has not tried anything OTC. Explained to her and her  that cough is a side effect of cancer in the lung and is a potential side effect of Keytruda. She reports sputum production but denies yellow/green color. Advised her to try OTC cough suppressant. She also reports diarrhea; she has not tried any OTC antidiarrheal. Advised her to take Imodium AD 1-2 tablets after every watery BM and inform us if it is not effective.     Reviewed labs with patient:   CBC:   Recent Labs   Lab 08/12/22  1323   WBC 9.96   RBC 6.31 H   Hemoglobin 15.4   Hematocrit 50.6 H   Platelets 303   MCV 80 L   MCH 24.4 L   MCHC 30.4 L     CMP:  Recent Labs   Lab 08/12/22  1323   Glucose 128 H   Calcium 8.8   Albumin 2.7 L   Total Protein 6.7    Sodium 135 L   Potassium 4.2   CO2 25   Chloride 97   BUN 22   Creatinine 1.0   Alkaline Phosphatase 141 H   ALT 12   AST 49 H   Total Bilirubin 0.9     Lab Results   Component Value Date    TSH 10.122 (H) 08/12/2022     Social History     Socioeconomic History    Marital status:    Tobacco Use    Smoking status: Never Smoker    Smokeless tobacco: Never Used   Substance and Sexual Activity    Alcohol use: No     Alcohol/week: 0.0 standard drinks    Drug use: No    Sexual activity: Yes     Partners: Male     Birth control/protection: Post-menopausal     Past Medical History:   Diagnosis Date    Hyperlipidemia     Hypertension     Stroke      Family History   Problem Relation Age of Onset    Stroke Mother     Stroke Father     Stroke Sister      Past Surgical History:   Procedure Laterality Date    BRONCHOSCOPY Bilateral 6/6/2022    Procedure: Bronchoscopy - insert lighted tube into airway to take a biopsy of lung;  Surgeon: Michi Ceron MD;  Location: [facility] ENDO;  Service: Endoscopy;  Laterality: Bilateral;    ENDOBRONCHIAL ULTRASOUND Bilateral 6/6/2022    Procedure: ENDOBRONCHIAL ULTRASOUND (EBUS);  Surgeon: Michi Ceron MD;  Location: [facility] ENDO;  Service: Endoscopy;  Laterality: Bilateral;    FLUOROSCOPY N/A 7/22/2022    Procedure: FLUOROSCOPY/Mediport placement;  Surgeon: Sara Delarosa MD;  Location: [facility] CATH LAB;  Service: General;  Laterality: N/A;    TONSILLECTOMY       Review of Systems   Constitutional: Positive for fatigue. Negative for appetite change.   HENT: Negative for mouth sores, rhinorrhea and sore throat.    Eyes: Negative.    Respiratory: Positive for cough.    Cardiovascular: Negative.    Gastrointestinal: Positive for diarrhea and nausea (relieved by antiemetic). Negative for constipation and vomiting.   Endocrine: Negative.    Genitourinary: Negative.    Musculoskeletal: Positive for leg pain (left thigh 7/10 sharp pain).        Left shoulder, arm  7/10 sharp; left rib   Integumentary:  Negative.   Allergic/Immunologic: Negative.    Neurological: Positive for dizziness and light-headedness. Negative for weakness, numbness and headaches.   Hematological: Negative.    Psychiatric/Behavioral: Negative.        Medication List with Changes/Refills   Current Medications    AMLODIPINE (NORVASC) 10 MG TABLET    Take 1 tablet (10 mg total) by mouth once daily.    ASPIRIN (ECOTRIN) 81 MG EC TABLET    Take 1 tablet (81 mg total) by mouth once daily.    ATORVASTATIN (LIPITOR) 80 MG TABLET    Take 1 tablet (80 mg total) by mouth once daily.    AXITINIB (INLYTA) 5 MG TAB    Take 1 tablet (5 mg) by mouth 2 (two) times daily.    CARVEDILOL (COREG) 6.25 MG TABLET    TAKE 1 TABLET BY MOUTH TWICE DAILY WITH MEALS    CETIRIZINE (ZYRTEC) 10 MG TABLET    Take 1 tablet (10 mg total) by mouth once daily.    CLOPIDOGREL (PLAVIX) 75 MG TABLET    Take 1 tablet by mouth once daily    FLUTICASONE PROPIONATE (FLONASE) 50 MCG/ACTUATION NASAL SPRAY    Use 2 sprays to each nostril daily    HYDROCHLOROTHIAZIDE (HYDRODIURIL) 25 MG TABLET    Take 1 tablet (25 mg total) by mouth once daily.    ONDANSETRON (ZOFRAN-ODT) 8 MG TBDL    Take 1 tablet (8 mg total) by mouth every 12 (twelve) hours as needed.    OXYCODONE (ROXICODONE) 5 MG IMMEDIATE RELEASE TABLET    Take 2.5mg(1/2) every 4 hrs as needed for pain. If ineffective may add 2.5mg(1/2) to next dose for max of 10mg dose every 4 hours as needed.    POTASSIUM CHLORIDE SA (K-DUR,KLOR-CON) 20 MEQ TABLET    Take 1 tablet (20 mEq total) by mouth once daily.    PROCHLORPERAZINE (COMPAZINE) 5 MG TABLET    Take 1 tablet (5 mg total) by mouth every 6 (six) hours as needed.     Objective:     Vitals:    08/15/22 1303   BP: 138/80   Pulse: 76   Temp: 98.6 °F (37 °C)       Physical Exam  Vitals reviewed.   Constitutional:       Appearance: Normal appearance. She is ill-appearing.   HENT:      Head: Normocephalic.      Mouth/Throat:      Comments: Wearing  mask    Eyes:      Extraocular Movements: Extraocular movements intact.      Pupils: Pupils are equal, round, and reactive to light.   Cardiovascular:      Rate and Rhythm: Normal rate and regular rhythm.      Heart sounds: Normal heart sounds.   Pulmonary:      Effort: Pulmonary effort is normal.      Breath sounds: Normal breath sounds.   Abdominal:      General: Bowel sounds are normal.      Palpations: Abdomen is soft.      Comments: rounded     Genitourinary:     Comments: deferred    Musculoskeletal:      Cervical back: Normal range of motion and neck supple.      Comments: Presents in wheelchair   Skin:     General: Skin is warm and dry.   Neurological:      Mental Status: She is alert and oriented to person, place, and time.   Psychiatric:         Behavior: Behavior normal.         Thought Content: Thought content normal.          (2) Ambulatory and capable of self care, unable to carry out work activity, up and about > 50% or waking hours  Assessment:     Problem List Items Addressed This Visit        Oncology    Malignant neoplasm of left kidney - Primary     Metastatic renal cell carcinoma. Reviewed pathology report from right lung biopsy. MRI of the brain revealed an area of enhancement in the left frontal bone extending into the soft tissue concerning for metastatic focus.     She understands that her disease is incurable but can be treated.      Regimen:   A cycle is every 21 days:  ? Axitinib   (Inlyta)  5 mg flat dose orally twice daily on days 1 through 21  ? Pembrolizumab   (Keytruda)  200 mg flat dose IV once on day 1 for up to 35 cycles (N Engl. J Med 2019;380(12):5813-5195)     2D-ECHO from 7/19/2022 showed EF of 60%.            Relevant Orders    CBC Auto Differential    Comprehensive Metabolic Panel    TSH    Malignant neoplasm metastatic to right lung    Relevant Orders    CBC Auto Differential    Comprehensive Metabolic Panel    TSH      Other Visit Diagnoses     Anorexia        Other  specified disorders involving the immune mechanism, not elsewhere classified         Relevant Orders    TSH        Plan:     Malignant neoplasm of left kidney  -     CBC Auto Differential; Future; Expected date: 09/05/2022  -     Comprehensive Metabolic Panel; Future; Expected date: 09/05/2022  -     TSH; Future; Expected date: 09/05/2022  -     megestroL (MEGACE) 40 MG Tab; Take 1 tablet (40 mg total) by mouth once daily.  Dispense: 30 tablet; Refill: 11  -     megestroL (MEGACE) 40 MG Tab; Take 1 tablet (40 mg total) by mouth 4 (four) times daily.  Dispense: 120 tablet; Refill: 11    Malignant neoplasm metastatic to right lung  -     CBC Auto Differential; Future; Expected date: 09/05/2022  -     Comprehensive Metabolic Panel; Future; Expected date: 09/05/2022  -     TSH; Future; Expected date: 09/05/2022  -     megestroL (MEGACE) 40 MG Tab; Take 1 tablet (40 mg total) by mouth once daily.  Dispense: 30 tablet; Refill: 11  -     megestroL (MEGACE) 40 MG Tab; Take 1 tablet (40 mg total) by mouth 4 (four) times daily.  Dispense: 120 tablet; Refill: 11    Anorexia  -     megestroL (MEGACE) 40 MG Tab; Take 1 tablet (40 mg total) by mouth once daily.  Dispense: 30 tablet; Refill: 11  -     megestroL (MEGACE) 40 MG Tab; Take 1 tablet (40 mg total) by mouth 4 (four) times daily.  Dispense: 120 tablet; Refill: 11    Other specified disorders involving the immune mechanism, not elsewhere classified   -     TSH; Future; Expected date: 09/05/2022    Labs reviewed.   Ok to proceed with C2D1 of Keytruda/Inlyta today.  Follow up in 3 weeks with Dr. Lee with CBC, Comprehensive Metabolic Panel and TSH prior to C3D1.  Start Megace for appetite stimulation; prescription sent to [facility] pharmacy per request.   Start OTC cough suppressant & antidiarrheal.   Start Synthroid for thyroid management while on Keytruda; prescription sent to [facility] per request.   CT scheduled for 8/25/2022 to evaluate left arm/side & thigh pain.     I  will review assessment/plan with collaborating physician Dr. Lee.      ELICEO Schultz

## 2022-08-11 NOTE — ASSESSMENT & PLAN NOTE
Metastatic renal cell carcinoma. Reviewed pathology report from right lung biopsy. MRI of the brain revealed an area of enhancement in the left frontal bone extending into the soft tissue concerning for metastatic focus.     She understands that her disease is incurable but can be treated.      Regimen:   A cycle is every 21 days:  ? Axitinib   (Inlyta)  5 mg flat dose orally twice daily on days 1 through 21  ? Pembrolizumab   (Keytruda)  200 mg flat dose IV once on day 1 for up to 35 cycles (N Engl. J Med 2019;380(12):6699-9699)     2D-ECHO from 7/19/2022 showed EF of 60%.

## 2022-08-11 NOTE — PROGRESS NOTES
Palliative nurse follow up CT Scan Sooner apt, per Pt and   request    Nurse reached out to pt/ with a sooner apt for CT Scan in [place] opposed to [place] date 8-25, pt's  declined this Monday 8-15, 2022 due to multiple oncology apt already scheduled. He requested to continue searching for sooner in [place], nurse notified [facility] staff, they informed nurse there is no wait list, however Rad[facility]Staff BOBBY stated she can personally follow up for sooner apt in [place]. Pt/  notified.

## 2022-08-12 ENCOUNTER — LAB VISIT (OUTPATIENT)
Dept: LAB | Facility: HOSPITAL | Age: 67
End: 2022-08-12
Attending: INTERNAL MEDICINE
Payer: MEDICARE

## 2022-08-12 DIAGNOSIS — T45.1X5A IMMUNODEFICIENCY DUE TO CHEMOTHERAPY: ICD-10-CM

## 2022-08-12 DIAGNOSIS — E86.0 DEHYDRATION: ICD-10-CM

## 2022-08-12 DIAGNOSIS — Z79.899 IMMUNODEFICIENCY DUE TO CHEMOTHERAPY: ICD-10-CM

## 2022-08-12 DIAGNOSIS — C64.2 MALIGNANT NEOPLASM OF LEFT KIDNEY: ICD-10-CM

## 2022-08-12 DIAGNOSIS — D84.821 IMMUNODEFICIENCY DUE TO CHEMOTHERAPY: ICD-10-CM

## 2022-08-12 LAB
BASOPHILS # BLD AUTO: 0.04 K/UL (ref 0–0.2)
BASOPHILS NFR BLD: 0.4 % (ref 0–1.9)
DIFFERENTIAL METHOD: ABNORMAL
EOSINOPHIL # BLD AUTO: 0 K/UL (ref 0–0.5)
EOSINOPHIL NFR BLD: 0.1 % (ref 0–8)
ERYTHROCYTE [DISTWIDTH] IN BLOOD BY AUTOMATED COUNT: 20.1 % (ref 11.5–14.5)
HCT VFR BLD AUTO: 50.6 % (ref 37–48.5)
HGB BLD-MCNC: 15.4 G/DL (ref 12–16)
IMM GRANULOCYTES # BLD AUTO: 0.03 K/UL (ref 0–0.04)
IMM GRANULOCYTES NFR BLD AUTO: 0.3 % (ref 0–0.5)
LYMPHOCYTES # BLD AUTO: 1.2 K/UL (ref 1–4.8)
LYMPHOCYTES NFR BLD: 12.4 % (ref 18–48)
MCH RBC QN AUTO: 24.4 PG (ref 27–31)
MCHC RBC AUTO-ENTMCNC: 30.4 G/DL (ref 32–36)
MCV RBC AUTO: 80 FL (ref 82–98)
MONOCYTES # BLD AUTO: 1 K/UL (ref 0.3–1)
MONOCYTES NFR BLD: 9.8 % (ref 4–15)
NEUTROPHILS # BLD AUTO: 7.7 K/UL (ref 1.8–7.7)
NEUTROPHILS NFR BLD: 77.3 % (ref 38–73)
NRBC BLD-RTO: 0 /100 WBC
PLATELET # BLD AUTO: 303 K/UL (ref 150–450)
PMV BLD AUTO: 9.7 FL (ref 9.2–12.9)
RBC # BLD AUTO: 6.31 M/UL (ref 4–5.4)
WBC # BLD AUTO: 9.96 K/UL (ref 3.9–12.7)

## 2022-08-12 PROCEDURE — 85025 COMPLETE CBC W/AUTO DIFF WBC: CPT | Mod: PO | Performed by: INTERNAL MEDICINE

## 2022-08-12 PROCEDURE — 84443 ASSAY THYROID STIM HORMONE: CPT | Performed by: INTERNAL MEDICINE

## 2022-08-12 PROCEDURE — 84439 ASSAY OF FREE THYROXINE: CPT | Performed by: INTERNAL MEDICINE

## 2022-08-12 PROCEDURE — 80053 COMPREHEN METABOLIC PANEL: CPT | Performed by: INTERNAL MEDICINE

## 2022-08-12 PROCEDURE — 36415 COLL VENOUS BLD VENIPUNCTURE: CPT | Mod: PO | Performed by: INTERNAL MEDICINE

## 2022-08-13 LAB
ALBUMIN SERPL BCP-MCNC: 2.7 G/DL (ref 3.5–5.2)
ALP SERPL-CCNC: 141 U/L (ref 55–135)
ALT SERPL W/O P-5'-P-CCNC: 12 U/L (ref 10–44)
ANION GAP SERPL CALC-SCNC: 13 MMOL/L (ref 8–16)
AST SERPL-CCNC: 49 U/L (ref 10–40)
BILIRUB SERPL-MCNC: 0.9 MG/DL (ref 0.1–1)
BUN SERPL-MCNC: 22 MG/DL (ref 8–23)
CALCIUM SERPL-MCNC: 8.8 MG/DL (ref 8.7–10.5)
CHLORIDE SERPL-SCNC: 97 MMOL/L (ref 95–110)
CO2 SERPL-SCNC: 25 MMOL/L (ref 23–29)
CREAT SERPL-MCNC: 1 MG/DL (ref 0.5–1.4)
EST. GFR  (NO RACE VARIABLE): >60 ML/MIN/1.73 M^2
GLUCOSE SERPL-MCNC: 128 MG/DL (ref 70–110)
POTASSIUM SERPL-SCNC: 4.2 MMOL/L (ref 3.5–5.1)
PROT SERPL-MCNC: 6.7 G/DL (ref 6–8.4)
SODIUM SERPL-SCNC: 135 MMOL/L (ref 136–145)
T4 FREE SERPL-MCNC: 0.79 NG/DL (ref 0.71–1.51)
TSH SERPL DL<=0.005 MIU/L-ACNC: 10.12 UIU/ML (ref 0.4–4)

## 2022-08-15 ENCOUNTER — SPECIALTY PHARMACY (OUTPATIENT)
Dept: PHARMACY | Facility: CLINIC | Age: 67
End: 2022-08-15
Payer: MEDICARE

## 2022-08-15 ENCOUNTER — OFFICE VISIT (OUTPATIENT)
Dept: HEMATOLOGY/ONCOLOGY | Facility: CLINIC | Age: 67
End: 2022-08-15
Payer: MEDICARE

## 2022-08-15 ENCOUNTER — INFUSION (OUTPATIENT)
Dept: INFUSION THERAPY | Facility: HOSPITAL | Age: 67
End: 2022-08-15
Attending: INTERNAL MEDICINE
Payer: MEDICARE

## 2022-08-15 VITALS
TEMPERATURE: 96 F | DIASTOLIC BLOOD PRESSURE: 80 MMHG | RESPIRATION RATE: 16 BRPM | SYSTOLIC BLOOD PRESSURE: 136 MMHG | WEIGHT: 157.88 LBS | HEART RATE: 73 BPM | OXYGEN SATURATION: 98 % | BODY MASS INDEX: 25.48 KG/M2

## 2022-08-15 VITALS
DIASTOLIC BLOOD PRESSURE: 80 MMHG | OXYGEN SATURATION: 98 % | SYSTOLIC BLOOD PRESSURE: 138 MMHG | HEART RATE: 76 BPM | WEIGHT: 157.88 LBS | HEIGHT: 66 IN | BODY MASS INDEX: 25.37 KG/M2 | TEMPERATURE: 99 F

## 2022-08-15 DIAGNOSIS — R63.0 ANOREXIA: ICD-10-CM

## 2022-08-15 DIAGNOSIS — C64.2 MALIGNANT NEOPLASM OF LEFT KIDNEY: Primary | ICD-10-CM

## 2022-08-15 DIAGNOSIS — D89.89 OTHER SPECIFIED DISORDERS INVOLVING THE IMMUNE MECHANISM, NOT ELSEWHERE CLASSIFIED: ICD-10-CM

## 2022-08-15 DIAGNOSIS — C78.01 MALIGNANT NEOPLASM METASTATIC TO RIGHT LUNG: ICD-10-CM

## 2022-08-15 PROCEDURE — 3079F PR MOST RECENT DIASTOLIC BLOOD PRESSURE 80-89 MM HG: ICD-10-PCS | Mod: CPTII,S$GLB,, | Performed by: NURSE PRACTITIONER

## 2022-08-15 PROCEDURE — 1101F PR PT FALLS ASSESS DOC 0-1 FALLS W/OUT INJ PAST YR: ICD-10-PCS | Mod: CPTII,S$GLB,, | Performed by: NURSE PRACTITIONER

## 2022-08-15 PROCEDURE — 63600175 PHARM REV CODE 636 W HCPCS: Mod: JG | Performed by: NURSE PRACTITIONER

## 2022-08-15 PROCEDURE — 1157F ADVNC CARE PLAN IN RCRD: CPT | Mod: CPTII,S$GLB,, | Performed by: NURSE PRACTITIONER

## 2022-08-15 PROCEDURE — 3008F PR BODY MASS INDEX (BMI) DOCUMENTED: ICD-10-PCS | Mod: CPTII,S$GLB,, | Performed by: NURSE PRACTITIONER

## 2022-08-15 PROCEDURE — 96413 CHEMO IV INFUSION 1 HR: CPT

## 2022-08-15 PROCEDURE — 3008F BODY MASS INDEX DOCD: CPT | Mod: CPTII,S$GLB,, | Performed by: NURSE PRACTITIONER

## 2022-08-15 PROCEDURE — 3288F PR FALLS RISK ASSESSMENT DOCUMENTED: ICD-10-PCS | Mod: CPTII,S$GLB,, | Performed by: NURSE PRACTITIONER

## 2022-08-15 PROCEDURE — 3075F SYST BP GE 130 - 139MM HG: CPT | Mod: CPTII,S$GLB,, | Performed by: NURSE PRACTITIONER

## 2022-08-15 PROCEDURE — 1157F PR ADVANCE CARE PLAN OR EQUIV PRESENT IN MEDICAL RECORD: ICD-10-PCS | Mod: CPTII,S$GLB,, | Performed by: NURSE PRACTITIONER

## 2022-08-15 PROCEDURE — 3044F PR MOST RECENT HEMOGLOBIN A1C LEVEL <7.0%: ICD-10-PCS | Mod: CPTII,S$GLB,, | Performed by: NURSE PRACTITIONER

## 2022-08-15 PROCEDURE — 3288F FALL RISK ASSESSMENT DOCD: CPT | Mod: CPTII,S$GLB,, | Performed by: NURSE PRACTITIONER

## 2022-08-15 PROCEDURE — 99214 OFFICE O/P EST MOD 30 MIN: CPT | Mod: 25,S$GLB,, | Performed by: NURSE PRACTITIONER

## 2022-08-15 PROCEDURE — 3075F PR MOST RECENT SYSTOLIC BLOOD PRESS GE 130-139MM HG: ICD-10-PCS | Mod: CPTII,S$GLB,, | Performed by: NURSE PRACTITIONER

## 2022-08-15 PROCEDURE — 1125F AMNT PAIN NOTED PAIN PRSNT: CPT | Mod: CPTII,S$GLB,, | Performed by: NURSE PRACTITIONER

## 2022-08-15 PROCEDURE — 25000003 PHARM REV CODE 250: Performed by: NURSE PRACTITIONER

## 2022-08-15 PROCEDURE — 3079F DIAST BP 80-89 MM HG: CPT | Mod: CPTII,S$GLB,, | Performed by: NURSE PRACTITIONER

## 2022-08-15 PROCEDURE — 99999 PR PBB SHADOW E&M-EST. PATIENT-LVL IV: ICD-10-PCS | Mod: PBBFAC,,, | Performed by: NURSE PRACTITIONER

## 2022-08-15 PROCEDURE — 99214 PR OFFICE/OUTPT VISIT, EST, LEVL IV, 30-39 MIN: ICD-10-PCS | Mod: 25,S$GLB,, | Performed by: NURSE PRACTITIONER

## 2022-08-15 PROCEDURE — 1125F PR PAIN SEVERITY QUANTIFIED, PAIN PRESENT: ICD-10-PCS | Mod: CPTII,S$GLB,, | Performed by: NURSE PRACTITIONER

## 2022-08-15 PROCEDURE — 1101F PT FALLS ASSESS-DOCD LE1/YR: CPT | Mod: CPTII,S$GLB,, | Performed by: NURSE PRACTITIONER

## 2022-08-15 PROCEDURE — 99999 PR PBB SHADOW E&M-EST. PATIENT-LVL IV: CPT | Mod: PBBFAC,,, | Performed by: NURSE PRACTITIONER

## 2022-08-15 PROCEDURE — 3044F HG A1C LEVEL LT 7.0%: CPT | Mod: CPTII,S$GLB,, | Performed by: NURSE PRACTITIONER

## 2022-08-15 RX ORDER — LEVOTHYROXINE SODIUM 25 UG/1
25 TABLET ORAL
Qty: 30 TABLET | Refills: 11 | Status: ON HOLD | OUTPATIENT
Start: 2022-08-15 | End: 2022-09-06 | Stop reason: HOSPADM

## 2022-08-15 RX ORDER — MEGESTROL ACETATE 40 MG/1
40 TABLET ORAL 4 TIMES DAILY
Qty: 120 TABLET | Refills: 11 | Status: SHIPPED | OUTPATIENT
Start: 2022-08-15 | End: 2023-08-15

## 2022-08-15 RX ORDER — EPINEPHRINE 0.3 MG/.3ML
0.3 INJECTION SUBCUTANEOUS ONCE AS NEEDED
Status: DISCONTINUED | OUTPATIENT
Start: 2022-08-15 | End: 2022-08-15 | Stop reason: HOSPADM

## 2022-08-15 RX ORDER — HEPARIN 100 UNIT/ML
500 SYRINGE INTRAVENOUS
Status: CANCELLED | OUTPATIENT
Start: 2022-08-15

## 2022-08-15 RX ORDER — DIPHENHYDRAMINE HYDROCHLORIDE 50 MG/ML
50 INJECTION INTRAMUSCULAR; INTRAVENOUS ONCE AS NEEDED
Status: CANCELLED | OUTPATIENT
Start: 2022-08-15

## 2022-08-15 RX ORDER — SODIUM CHLORIDE 0.9 % (FLUSH) 0.9 %
10 SYRINGE (ML) INJECTION
Status: CANCELLED | OUTPATIENT
Start: 2022-08-15

## 2022-08-15 RX ORDER — EPINEPHRINE 0.3 MG/.3ML
0.3 INJECTION SUBCUTANEOUS ONCE AS NEEDED
Status: CANCELLED | OUTPATIENT
Start: 2022-08-15

## 2022-08-15 RX ORDER — MEGESTROL ACETATE 40 MG/1
40 TABLET ORAL DAILY
Qty: 30 TABLET | Refills: 11 | Status: CANCELLED | OUTPATIENT
Start: 2022-08-15 | End: 2023-08-15

## 2022-08-15 RX ORDER — LIDOCAINE AND PRILOCAINE 25; 25 MG/G; MG/G
CREAM TOPICAL
Qty: 30 G | Refills: 0 | Status: ON HOLD | OUTPATIENT
Start: 2022-08-15 | End: 2022-09-06 | Stop reason: HOSPADM

## 2022-08-15 RX ORDER — DIPHENHYDRAMINE HYDROCHLORIDE 50 MG/ML
50 INJECTION INTRAMUSCULAR; INTRAVENOUS ONCE AS NEEDED
Status: DISCONTINUED | OUTPATIENT
Start: 2022-08-15 | End: 2022-08-15 | Stop reason: HOSPADM

## 2022-08-15 RX ORDER — HEPARIN 100 UNIT/ML
500 SYRINGE INTRAVENOUS
Status: DISCONTINUED | OUTPATIENT
Start: 2022-08-15 | End: 2022-08-15 | Stop reason: HOSPADM

## 2022-08-15 RX ADMIN — SODIUM CHLORIDE 200 MG: 9 INJECTION, SOLUTION INTRAVENOUS at 02:08

## 2022-08-15 RX ADMIN — HEPARIN 500 UNITS: 100 SYRINGE at 03:08

## 2022-08-15 NOTE — TELEPHONE ENCOUNTER
Specialty Pharmacy - Refill Coordination    Specialty Medication Orders Linked to Encounter    Flowsheet Row Most Recent Value   Medication #1 axitinib (INLYTA) 5 mg Tab (Order#[id number], Rx#[id number])          Refill Questions - Documented Responses    Flowsheet Row Most Recent Value   Patient Availability and HIPAA Verification    Does patient want to proceed with activity? Yes   HIPAA/medical authority confirmed? Yes   Relationship to patient of person spoken to? Self   Refill Screening Questions    Changes to allergies? No   Changes to medications? No   New conditions since last clinic visit? No   Unplanned office visit, urgent care, ED, or hospital admission in the last 4 weeks? Yes  [ER about a month ago for calcium level, now resolved]   How does patient/caregiver feel medication is working? Good   Financial problems or insurance changes? No   How many doses of your specialty medications were missed in the last 4 weeks? 0   Would patient like to speak to a pharmacist? No   When does the patient need to receive the medication? 08/22/22   Refill Delivery Questions    How will the patient receive the medication? Delivery [facility]   When does the patient need to receive the medication? 08/22/22   Shipping Address Home   Address in [facility] confirmed and updated if neccessary? Yes   Expected Copay ($) 0   Is the patient able to afford the medication copay? Yes   Payment Method zero copay   Days supply of Refill 30   Supplies needed? No supplies needed   Refill activity completed? Yes   Refill activity plan Refill scheduled   Shipment/Pickup Date: 08/15/22          Current Outpatient Medications   Medication Sig    amLODIPine (NORVASC) 10 MG tablet Take 1 tablet (10 mg total) by mouth once daily.    aspirin (ECOTRIN) 81 MG EC tablet Take 1 tablet (81 mg total) by mouth once daily.    atorvastatin (LIPITOR) 80 MG tablet Take 1 tablet (80 mg total) by mouth once daily.    axitinib (INLYTA) 5 mg Tab Take 1  tablet (5 mg) by mouth 2 (two) times daily.    carvediloL (COREG) 6.25 MG tablet TAKE 1 TABLET BY MOUTH TWICE DAILY WITH MEALS    cetirizine (ZYRTEC) 10 MG tablet Take 1 tablet (10 mg total) by mouth once daily.    clopidogreL (PLAVIX) 75 mg tablet Take 1 tablet by mouth once daily    fluticasone propionate (FLONASE) 50 mcg/actuation nasal spray Use 2 sprays to each nostril daily    hydroCHLOROthiazide (HYDRODIURIL) 25 MG tablet Take 1 tablet (25 mg total) by mouth once daily.    ondansetron (ZOFRAN-ODT) 8 MG TbDL Take 1 tablet (8 mg total) by mouth every 12 (twelve) hours as needed.    oxyCODONE (ROXICODONE) 5 MG immediate release tablet Take 2.5mg(1/2) every 4 hrs as needed for pain. If ineffective may add 2.5mg(1/2) to next dose for max of 10mg dose every 4 hours as needed.    potassium chloride SA (K-DUR,KLOR-CON) 20 MEQ tablet Take 1 tablet (20 mEq total) by mouth once daily.    prochlorperazine (COMPAZINE) 5 MG tablet Take 1 tablet (5 mg total) by mouth every 6 (six) hours as needed.   Last reviewed on 8/9/2022  5:02 PM by Taylor Jordan PA-C    Review of patient's allergies indicates:  No Known Allergies Last reviewed on  8/9/2022 5:02 PM by Taylor Jordan      Tasks added this encounter   9/8/2022 - Refill Call (Auto Added)   Tasks due within next 3 months   No tasks due.     Jade Montes De Oca, PharmD  [facility]  [address] [address]  Phone: [phone]  Fax: [phone]

## 2022-08-15 NOTE — PLAN OF CARE
Problem: Adult Inpatient Plan of Care  Goal: Plan of Care Review  Flowsheets (Taken 8/15/2022 1439)  Plan of Care Reviewed With:   patient   spouse  Goal: Optimal Comfort and Wellbeing  Intervention: Provide Person-Centered Care  Flowsheets (Taken 8/15/2022 1439)  Trust Relationship/Rapport:   care explained   questions encouraged   choices provided   reassurance provided   emotional support provided   thoughts/feelings acknowledged   empathic listening provided   questions answered     Problem: Fall Injury Risk  Goal: Absence of Fall and Fall-Related Injury  Intervention: Identify and Manage Contributors  Flowsheets (Taken 8/15/2022 1439)  Self-Care Promotion: BADL personal routines maintained  Medication Review/Management: medications reviewed  Intervention: Promote Injury-Free Environment  Flowsheets (Taken 8/15/2022 1439)  Safety Promotion/Fall Prevention:   in recliner, wheels locked   nonskid shoes/socks when out of bed   room near unit station   family to remain at bedside     Problem: Fatigue  Goal: Improved Activity Tolerance  Intervention: Promote Improved Energy  Flowsheets (Taken 8/15/2022 1439)  Fatigue Management: frequent rest breaks encouraged  Sleep/Rest Enhancement:   family presence promoted   regular sleep/rest pattern promoted   natural light exposure provided

## 2022-08-15 NOTE — NURSING
Patient tolerated treatment. EMLA cream was ordered by provider because patient complained of discomfort when needle goes into port. See Flowsheet for port assessment. with her and they use the facility wheelchair. VSS. NAD. She left without incident.

## 2022-08-15 NOTE — DISCHARGE INSTRUCTIONS
[facility]  [address] [address]  [address]  [phone] phone     [phone] fax  Hours of Operation: Monday- Friday 8:00am- 5:00pm  After hours phone  [phone]  Hematology / Oncology Physicians on call      KEYUR Rey Dr[address] Dr., NP Sydney Prescott, CLINTON Smallwood FNP    Please call with any concerns regarding your appointment today.

## 2022-08-16 ENCOUNTER — TELEPHONE (OUTPATIENT)
Dept: PHARMACY | Facility: CLINIC | Age: 67
End: 2022-08-16
Payer: MEDICARE

## 2022-08-25 ENCOUNTER — HOSPITAL ENCOUNTER (OUTPATIENT)
Dept: RADIOLOGY | Facility: HOSPITAL | Age: 67
Discharge: HOME | End: 2022-08-25
Attending: PHYSICIAN ASSISTANT
Payer: MEDICARE

## 2022-08-25 ENCOUNTER — PATIENT MESSAGE (OUTPATIENT)
Dept: HEMATOLOGY/ONCOLOGY | Facility: CLINIC | Age: 67
End: 2022-08-25
Payer: MEDICARE

## 2022-08-25 DIAGNOSIS — C64.2 MALIGNANT NEOPLASM OF LEFT KIDNEY: ICD-10-CM

## 2022-08-25 PROCEDURE — 71250 CT THORAX DX C-: CPT | Mod: TC

## 2022-08-25 PROCEDURE — 25500020 PHARM REV CODE 255: Performed by: PHYSICIAN ASSISTANT

## 2022-08-25 PROCEDURE — 74178 CT ABD&PLV WO CNTR FLWD CNTR: CPT | Mod: TC

## 2022-08-25 RX ADMIN — IOHEXOL 100 ML: 350 INJECTION, SOLUTION INTRAVENOUS at 02:08

## 2022-08-31 ENCOUNTER — TELEPHONE (OUTPATIENT)
Dept: HEMATOLOGY/ONCOLOGY | Facility: CLINIC | Age: 67
End: 2022-08-31
Payer: MEDICARE

## 2022-08-31 NOTE — TELEPHONE ENCOUNTER
Spoke to pt  he states that the pt had blood in her urine a lot and they are at the emergency room in [place]. Informed him that I would relay the message to Dr Lee and keep us updated on his care.

## 2022-09-01 PROBLEM — C79.9 METASTATIC CANCER: Status: ACTIVE | Noted: 2022-09-01

## 2022-09-01 PROBLEM — G93.9 BRAIN LESION: Status: ACTIVE | Noted: 2022-09-01

## 2022-09-01 PROBLEM — R31.9 HEMATURIA: Status: ACTIVE | Noted: 2022-09-01

## 2022-09-01 PROBLEM — Z71.89 ACP (ADVANCE CARE PLANNING): Status: ACTIVE | Noted: 2022-09-01

## 2022-09-01 PROBLEM — R65.20 SEVERE SEPSIS: Status: ACTIVE | Noted: 2022-09-01

## 2022-09-01 PROBLEM — G93.41 ACUTE METABOLIC ENCEPHALOPATHY: Status: ACTIVE | Noted: 2022-09-01

## 2022-09-01 PROBLEM — A41.9 SEVERE SEPSIS: Status: ACTIVE | Noted: 2022-09-01

## 2022-09-02 PROBLEM — E43 SEVERE MALNUTRITION: Status: ACTIVE | Noted: 2022-09-02

## 2022-09-04 PROBLEM — R79.89 ABNORMAL THYROID BLOOD TEST: Status: ACTIVE | Noted: 2022-09-04

## 2022-09-04 PROBLEM — I63.81 LACUNAR INFARCT, ACUTE: Status: ACTIVE | Noted: 2022-09-01

## 2022-09-06 PROBLEM — Z51.5 HOSPICE CARE: Status: ACTIVE | Noted: 2022-09-06

## 2022-09-08 ENCOUNTER — SPECIALTY PHARMACY (OUTPATIENT)
Dept: PHARMACY | Facility: CLINIC | Age: 67
End: 2022-09-08
Payer: MEDICARE

## 2022-09-08 NOTE — TELEPHONE ENCOUNTER
Outgoing call to patient. Per pt's  pt is no longer taking Inlyta. Informed pt about appropriate disposal of medication, but was hung-up on. Will dis-enroll patient.

## 2022-09-16 ENCOUNTER — TELEPHONE (OUTPATIENT)
Dept: HEMATOLOGY/ONCOLOGY | Facility: CLINIC | Age: 67
End: 2022-09-16
Payer: MEDICARE

## 2022-09-16 NOTE — NURSING
0943am: Outgoing call to pt regarding 1 mth acuity onc osmany check. Pt didn't answer. LVM.   Oncology Navigation   Intake  Cancer Type:   Internal / External Referral: Internal  Referral Source: Dr. Ximena Rosario  Date of Referral: 22  Initial Nurse Navigator Contact: 22  Referral to Initial Contact Timeline (days): 1  Date Worked: 22  First Appointment Available: 22  Appointment Date: 22  Schedule to Appointment Timeline (days): 7  First Available Date vs. Scheduled Date (days): 0  Multiple appointments: Yes  Start of Treatment: 22     Treatment  Current Status: Active  Date Presented to Tumor Board: 22       Medical Oncologist: Dr. Ghassan Lee  Immunotherapy: Planned  Immunotherapy Name: Keytruda  Oral Therapy: Planned  Oral Therapy Name: Axitinib    Radiation Oncologist: Dr. Nayely Rea    Procedures: Port / PICC  Echo Schedule Date: 22  Port / PICC Schedule Date: 22    General Referrals: Social work  Social Work: notified via msg  Social Work Referral Date: 22       Radiation Oncologist: Dr. Nayely Rea    Support Systems: Spouse/significant other     Acuity  Stage: 2  Systemic Treatment - predicted or initiated: More than one treatment modality concurrently (chemotherapy, radiation, etc.) (+2)  Treatment Tolerability: Has not started treatment yet/treatment fully completed and side effects resolved  ECO  Comorbidities in Medical History: 2   Needed: 0  Support: 0  Verbalizes the need for more education: 1  Navigation Acuity: 9     Follow Up  No follow-ups on file.

## 2022-09-28 DIAGNOSIS — Z78.0 MENOPAUSE: ICD-10-CM

## 2022-12-05 PROBLEM — A41.9 SEVERE SEPSIS: Status: RESOLVED | Noted: 2022-09-01 | Resolved: 2022-12-05

## 2022-12-05 PROBLEM — R65.20 SEVERE SEPSIS: Status: RESOLVED | Noted: 2022-09-01 | Resolved: 2022-12-05

## 2024-01-31 DIAGNOSIS — Z78.0 MENOPAUSE: ICD-10-CM

## 2024-02-28 DIAGNOSIS — Z12.31 OTHER SCREENING MAMMOGRAM: ICD-10-CM

## (undated) DEVICE — SCALPEL SZ 10 RETRACTABLE

## (undated) DEVICE — SHEET THYROID W/ISO-BAC

## (undated) DEVICE — PACK CATH LAB CUSTOM BR

## (undated) DEVICE — DRESSING AQUACEL AG ADV 3.5X6